# Patient Record
Sex: MALE | Race: WHITE | NOT HISPANIC OR LATINO | Employment: OTHER | URBAN - METROPOLITAN AREA
[De-identification: names, ages, dates, MRNs, and addresses within clinical notes are randomized per-mention and may not be internally consistent; named-entity substitution may affect disease eponyms.]

---

## 2017-04-06 ENCOUNTER — OFFICE VISIT (OUTPATIENT)
Dept: FAMILY MEDICINE | Facility: CLINIC | Age: 67
End: 2017-04-06
Payer: COMMERCIAL

## 2017-04-06 VITALS
WEIGHT: 239.5 LBS | OXYGEN SATURATION: 96 % | HEIGHT: 71 IN | HEART RATE: 71 BPM | DIASTOLIC BLOOD PRESSURE: 75 MMHG | RESPIRATION RATE: 18 BRPM | BODY MASS INDEX: 33.53 KG/M2 | TEMPERATURE: 96.5 F | SYSTOLIC BLOOD PRESSURE: 130 MMHG

## 2017-04-06 DIAGNOSIS — E11.9 TYPE 2 DIABETES MELLITUS WITHOUT COMPLICATION, WITHOUT LONG-TERM CURRENT USE OF INSULIN (H): Primary | ICD-10-CM

## 2017-04-06 DIAGNOSIS — Z23 NEED FOR PROPHYLACTIC VACCINATION WITH TETANUS-DIPHTHERIA (TD): ICD-10-CM

## 2017-04-06 DIAGNOSIS — F32.0 MAJOR DEPRESSIVE DISORDER, SINGLE EPISODE, MILD (H): ICD-10-CM

## 2017-04-06 DIAGNOSIS — E78.5 HYPERLIPIDEMIA LDL GOAL <100: ICD-10-CM

## 2017-04-06 DIAGNOSIS — I10 ESSENTIAL HYPERTENSION WITH GOAL BLOOD PRESSURE LESS THAN 140/90: ICD-10-CM

## 2017-04-06 DIAGNOSIS — Z13.89 SCREENING FOR DIABETIC PERIPHERAL NEUROPATHY: ICD-10-CM

## 2017-04-06 DIAGNOSIS — Z23 NEED FOR VACCINATION: ICD-10-CM

## 2017-04-06 LAB — HBA1C MFR BLD: 7.2 % (ref 4.3–6)

## 2017-04-06 PROCEDURE — 90471 IMMUNIZATION ADMIN: CPT | Performed by: FAMILY MEDICINE

## 2017-04-06 PROCEDURE — 36415 COLL VENOUS BLD VENIPUNCTURE: CPT | Performed by: FAMILY MEDICINE

## 2017-04-06 PROCEDURE — 90715 TDAP VACCINE 7 YRS/> IM: CPT | Mod: 25 | Performed by: FAMILY MEDICINE

## 2017-04-06 PROCEDURE — 83036 HEMOGLOBIN GLYCOSYLATED A1C: CPT | Performed by: FAMILY MEDICINE

## 2017-04-06 PROCEDURE — 99214 OFFICE O/P EST MOD 30 MIN: CPT | Mod: 25 | Performed by: FAMILY MEDICINE

## 2017-04-06 PROCEDURE — 99207 C FOOT EXAM  NO CHARGE: CPT | Mod: 25 | Performed by: FAMILY MEDICINE

## 2017-04-06 RX ORDER — FLUOXETINE 10 MG/1
CAPSULE ORAL
Qty: 60 CAPSULE | Refills: 1 | Status: SHIPPED | OUTPATIENT
Start: 2017-04-06 | End: 2017-05-04 | Stop reason: DRUGHIGH

## 2017-04-06 RX ORDER — LOVASTATIN 40 MG
TABLET ORAL
Qty: 135 TABLET | Refills: 3 | Status: SHIPPED | OUTPATIENT
Start: 2017-04-06 | End: 2018-06-12

## 2017-04-06 ASSESSMENT — PAIN SCALES - GENERAL: PAINLEVEL: NO PAIN (0)

## 2017-04-06 NOTE — NURSING NOTE
Prior to injection verified patient identity using patient's name and date of birth.   Patient instructed to remain in clinic for 20 minutes afterwards and to report any adverse reaction to me immediately.  Madelyn Costa, [facility]

## 2017-04-06 NOTE — NURSING NOTE
"Chief Complaint   Patient presents with     Hyperlipidemia     follow up     Diabetes     follow up     Hypertension     follow up       Initial /82 (BP Location: Left arm, Patient Position: Chair, Cuff Size: Adult Large)  Pulse 71  Temp 96.5  F (35.8  C) (Tympanic)  Resp 18  Ht 5' 11.1\" (1.806 m)  Wt 239 lb 8 oz (108.6 kg)  SpO2 96%  BMI 33.31 kg/m2 Estimated body mass index is 33.31 kg/(m^2) as calculated from the following:    Height as of this encounter: 5' 11.1\" (1.806 m).    Weight as of this encounter: 239 lb 8 oz (108.6 kg).  Medication Reconciliation: complete   Health Maintenance Due   Topic Date Due     EYE EXAM Q1 YEAR( NO INBASKET)  01/30/2013     TETANUS IMMUNIZATION (SYSTEM ASSIGNED)  11/14/2015     FOOT EXAM Q1 YEAR( NO INBASKET)  09/23/2016     DEPRESSION ACTION PLAN Q1 YR (NO INBASKET)  09/23/2016     ADVANCE DIRECTIVE PLANNING Q5 YRS (NO INBASKET)  12/21/2016     A1C Q6 MO( NO INBASKET)  04/19/2017     PHQ-9 Q6 MONTHS (NO INBASKET)  04/19/2017     Madelyn Costa, [facility]      "

## 2017-04-06 NOTE — PROGRESS NOTES
SUBJECTIVE:                                                    Mj Ruth is a 66 year old male who presents to clinic today for the following health issues:        Diabetes Follow-up    Patient is checking blood sugars: once daily.  Results are as follows:         am - before breakfast    Diabetic concerns: None     Symptoms of hypoglycemia (low blood sugar): none     Paresthesias (numbness or burning in feet) or sores: No     Date of last diabetic eye exam: October 2016     Hyperlipidemia Follow-Up      Rate your low fat/cholesterol diet?: fair    Taking statin?  Yes, no muscle aches from statin    Other lipid medications/supplements?:  none     Hypertension Follow-up      Outpatient blood pressures are being checked at store.  Results are systolic 130's, diastolic 85.    Low Salt Diet: low salt         Amount of exercise or physical activity: None    Problems taking medications regularly: No    Medication side effects: none    Diet: regular (no restrictions)          Problem list and histories reviewed & adjusted, as indicated.  Additional history: as documented    Patient Active Problem List   Diagnosis     Erectile dysfunction     Shingles     Obstructive chronic bronchitis with exacerbation (H)     Health Care Home     Advanced directives, counseling/discussion     Hyperlipidemia LDL goal <100     Type 2 diabetes mellitus with other diabetic kidney complication (H)     Essential hypertension with goal blood pressure less than 140/90     Chronic obstructive pulmonary disease, unspecified COPD type (H)     Past Surgical History:   Procedure Laterality Date     COLONOSCOPY N/A 11/5/2014    Procedure: COMBINED COLONOSCOPY, SINGLE OR MULTIPLE BIOPSY/POLYPECTOMY BY BIOPSY;  Surgeon: Douglas Ortiz MD;  Location:  GI     FINGER SURGERY  1998    pins and repair after caught in lathe     HC REMOVE TONSILS/ADENOIDS,<11 Y/O      T & A <11y.o.       Social History   Substance Use Topics     Smoking  "status: Former Smoker     Packs/day: 1.00     Years: 35.00     Types: Cigarettes     Quit date: 1/4/2011     Smokeless tobacco: Never Used     Alcohol use Yes      Comment: once monthly     Family History   Problem Relation Age of Onset     DIABETES Mother      DIABETES Sister      Hypertension Mother      HEART DISEASE Father      Not sure     Obesity Mother            Reviewed and updated as needed this visit by clinical staff       Reviewed and updated as needed this visit by Provider         ROS:  Constitutional, HEENT, cardiovascular, pulmonary, gi and gu systems are negative, except as otherwise noted.    OBJECTIVE:                                                    /75  Pulse 71  Temp 96.5  F (35.8  C) (Tympanic)  Resp 18  Ht 5' 11.1\" (1.806 m)  Wt 239 lb 8 oz (108.6 kg)  SpO2 96%  BMI 33.31 kg/m2  Body mass index is 33.31 kg/(m^2).   GENERAL: healthy, alert and no distress  NECK: no adenopathy, no asymmetry, masses, or scars and thyroid normal to palpation  RESP: lungs clear to auscultation - no rales, rhonchi or wheezes  CV: regular rate and rhythm, normal S1 S2, no S3 or S4, no murmur, click or rub, no peripheral edema and peripheral pulses strong  ABDOMEN: soft, nontender, no hepatosplenomegaly, no masses and bowel sounds normal  MS: no gross musculoskeletal defects noted, no edema         Component      Latest Ref Rng & Units 4/6/2017   Hemoglobin A1C      4.3 - 6.0 % 7.2 (H)     ASSESSMENT:                                                        PLAN:                                                    1. Screening for diabetic peripheral neuropathy  normal  - FOOT EXAM  NO CHARGE [15149.114]    2. Need for prophylactic vaccination with tetanus-diphtheria (TD)      3. Hyperlipidemia LDL goal <100  Good control   - lovastatin (MEVACOR) 40 MG tablet; TAKE ONE & ONE-HALF TABLETS BY MOUTH ONCE DAILY  Dispense: 135 tablet; Refill: 3    4. Essential hypertension with goal blood pressure less than " 140/90  Stable   - lovastatin (MEVACOR) 40 MG tablet; TAKE ONE & ONE-HALF TABLETS BY MOUTH ONCE DAILY  Dispense: 135 tablet; Refill: 3    5. Type 2 diabetes mellitus without complication, without long-term current use of insulin (H)  Good control   - HEMOGLOBIN A1C    6. Major depressive disorder, single episode, mild (H)  Discussed at length today   Get out and be active   Daily exercise   - FLUoxetine (PROZAC) 10 MG capsule; One a day for two weeks then Two PO Q day  Dispense: 60 capsule; Refill: 1  Follow up one month   7. Need for vaccination    - TDAP VACCINE (ADACEL) [21220.002]  - 1st  Administration  [56604]        Work on weight loss  Regular exercise      I spent 25 minutes with this patient over 50% of the time was in healthcare counseling, careplan development, strategies for partnering in keeping this patient healthy and appropriate referrals and follow up    Thomas Bosch MD, MD  [facility]

## 2017-04-06 NOTE — MR AVS SNAPSHOT
After Visit Summary   4/6/2017    Mj Ruth    MRN: [id number]           Patient Information     Date Of Birth          1950        Visit Information        Provider Department      4/6/2017 10:00 AM Thomas Bosch MD [facility]        Today's Diagnoses     Type 2 diabetes mellitus without complication, without long-term current use of insulin (H)    -  1    Screening for diabetic peripheral neuropathy        Need for prophylactic vaccination with tetanus-diphtheria (TD)        Hyperlipidemia LDL goal <100        Essential hypertension with goal blood pressure less than 140/90        Major depressive disorder, single episode, mild (H)        Need for vaccination           Follow-ups after your visit        Your next 10 appointments already scheduled     May 04, 2017  9:20 AM CDT   SHORT with Thomas Bosch MD   [facility] ([facility])    [address] [place] [id number] [phone]              Who to contact     If you have questions or need follow up information about today's clinic visit or your schedule please contact [facility] directly at [phone].  Normal or non-critical lab and imaging results will be communicated to you by MyChart, letter or phone within 4 business days after the clinic has received the results. If you do not hear from us within 7 days, please contact the clinic through [employer] or phone. If you have a critical or abnormal lab result, we will notify you by phone as soon as possible.  Submit refill requests through [employer] or call your pharmacy and they will forward the refill request to us. Please allow 3 business days for your refill to be completed.          Additional Information About Your Visit        MyChart Information     [employer] lets you send messages to your doctor, view your test results, renew your prescriptions, schedule appointments and more. To sign up, go to  [email or web address] . Click on \"Log in\" on the left side of the screen, which will take you to the Welcome page. Then click on \"Sign up Now\" on the right side of the page.     You will be asked to enter the access code listed below, as well as some personal information. Please follow the directions to create your username and password.     Your access code is: [email or web address]  Expires: 2017  7:17 PM     Your access code will  in 90 days. If you need help or a new code, please call your [place] clinic or [phone].        Care EveryWhere ID     This is your Care EveryWhere ID. This could be used by other organizations to access your [place] medical records  [id number]        Your Vitals Were     Pulse Temperature Respirations Height Pulse Oximetry BMI (Body Mass Index)    71 96.5  F (35.8  C) (Tympanic) 18 5' 11.1\" (1.806 m) 96% 33.31 kg/m2       Blood Pressure from Last 3 Encounters:   17 130/75   10/19/16 136/84   11/20/15 132/78    Weight from Last 3 Encounters:   17 239 lb 8 oz (108.6 kg)   10/19/16 236 lb 8 oz (107.3 kg)   10/12/15 225 lb (102.1 kg)              We Performed the Following     1st  Administration  [58094]     DEPRESSION ACTION PLAN (DAP) Order [[id number]]     FOOT EXAM  NO CHARGE [23967.114]     HEMOGLOBIN A1C     SCREENING QUESTIONS FOR ADULT IMMUNIZATIONS     TDAP VACCINE (ADACEL) [21505.002]          Today's Medication Changes          These changes are accurate as of: 17 11:59 PM.  If you have any questions, ask your nurse or doctor.               Start taking these medicines.        Dose/Directions    FLUoxetine 10 MG capsule   Commonly known as:  PROzac   Used for:  Major depressive disorder, single episode, mild (H)   Started by:  Thomas Bosch MD        One a day for two weeks then Two PO Q day   Quantity:  60 capsule   Refills:  1         These medicines have changed or have updated prescriptions.        Dose/Directions    lovastatin 40 MG tablet "   Commonly known as:  MEVACOR   This may have changed:  See the new instructions.   Used for:  Hyperlipidemia LDL goal <100, Essential hypertension with goal blood pressure less than 140/90   Changed by:  Thomas Bosch MD        TAKE ONE & ONE-HALF TABLETS BY MOUTH ONCE DAILY   Quantity:  135 tablet   Refills:  3            Where to get your medicines      These medications were sent to [facility] [address] [place] [address] - [address]  [address], [facility] [address]     Phone:  [phone]     FLUoxetine 10 MG capsule    lovastatin 40 MG tablet                Primary Care Provider Office Phone # Fax #    Thomas Bosch [phone]       [facility] [address] [facility] [address]        Thank you!     Thank you for choosing [facility]  for your care. Our goal is always to provide you with excellent care. Hearing back from our patients is one way we can continue to improve our services. Please take a few minutes to complete the written survey that you may receive in the mail after your visit with us. Thank you!             Your Updated Medication List - Protect others around you: Learn how to safely use, store and throw away your medicines at www.disposemymeds.org.          This list is accurate as of: 4/6/17 11:59 PM.  Always use your most recent med list.                   Brand Name Dispense Instructions for use    ACCU-CHEK WENDY Kit     1 kit    TO TEST 5 TIMES A DAY, BEFORE GIVING BOTH INSULIN       ACE/ARB NOT PRESCRIBED (INTENTIONAL)      1 each daily ACE & ARB not prescribed due to Intolerance       aspirin 81 MG tablet     30 tablet    Take 1 tablet (81 mg) by mouth daily       blood glucose calibration solution    no brand specified    1 Bottle    1 drop by Test Solution route every 30 days. WENDY       blood glucose monitoring test strip    ACCU-CHEK WENDY    3 Box    Use to test 4 times daily before meals and bedtime.       FLUoxetine 10 MG  capsule    PROzac    60 capsule    One a day for two weeks then Two PO Q day       ipratropium - albuterol 0.5 mg/2.5 mg/3 mL 0.5-2.5 (3) MG/3ML neb solution    DUONEB    1 Box    Take 1 vial (3 mLs) by nebulization every 4 hours as needed       lovastatin 40 MG tablet    MEVACOR    135 tablet    TAKE ONE & ONE-HALF TABLETS BY MOUTH ONCE DAILY       metFORMIN 500 MG tablet    GLUCOPHAGE    180 tablet    Take 1 tablet (500 mg) by mouth 2 times daily (with meals)       metoprolol 100 MG tablet    LOPRESSOR    180 tablet    Take 1 tablet (100 mg) by mouth 2 times daily       order for DME     1 Device    Neb machine for home.       tadalafil 10 MG tablet    CIALIS    5 tablet    Take 0.5-1 tablets (5-10 mg) by mouth daily as needed for erectile dysfunction Never use with nitroglycerin, terazosin or doxazosin.

## 2017-04-06 NOTE — LETTER
My Depression Action Plan  Name: Mj Ruth   Date of Birth 1950  Date: 4/6/2017    My doctor: Thomas Bosch   My clinic: [address] [address] [phone]          GREEN    ZONE   Good Control    What it looks like:     Things are going generally well. You have normal up s and down s. You may even feel depressed from time to time, but bad moods usually last less than a day.   What you need to do:  1. Continue to care for yourself (see self care plan)  2. Check your depression survival kit and update it as needed  3. Follow your physician s recommendations including any medication.  4. Do not stop taking medication unless you consult with your physician first.           YELLOW         ZONE Getting Worse    What it looks like:     Depression is starting to interfere with your life.     It may be hard to get out of bed; you may be starting to isolate yourself from others.    Symptoms of depression are starting to last most all day and this has happened for several days.     You may have suicidal thoughts but they are not constant.   What you need to do:     1. Call your care team, your response to treatment will improve if you keep your care team informed of your progress. Yellow periods are signs an adjustment may need to be made.     2. Continue your self-care, even if you have to fake it!    3. Talk to someone in your support network    4. Open up your depression survival kit           RED    ZONE Medical Alert - Get Help    What it looks like:     Depression is seriously interfering with your life.     You may experience these or other symptoms: You can t get out of bed most days, can t work or engage in other necessary activities, you have trouble taking care of basic hygiene, or basic responsibilities, thoughts of suicide or death that will not go away, self-injurious behavior.     What you need to do:  1. Call your care team and  request a same-day appointment. If they are not available (weekends or after hours) call your local crisis line, emergency room or 911.      Electronically signed by: Nicole Costa, April 6, 2017    Depression Self Care Plan / Survival Kit    Self-Care for Depression  Here s the deal. Your body and mind are really not as separate as most people think.  What you do and think affects how you feel and how you feel influences what you do and think. This means if you do things that people who feel good do, it will help you feel better.  Sometimes this is all it takes.  There is also a place for medication and therapy depending on how severe your depression is, so be sure to consult with your medical provider and/ or Behavioral Health Consultant if your symptoms are worsening or not improving.     In order to better manage my stress, I will:    Exercise  Get some form of exercise, every day. This will help reduce pain and release endorphins, the  feel good  chemicals in your brain. This is almost as good as taking antidepressants!  This is not the same as joining a gym and then never going! (they count on that by the way ) It can be as simple as just going for a walk or doing some gardening, anything that will get you moving.      Hygiene   Maintain good hygiene (Get out of bed in the morning, Make your bed, Brush your teeth, Take a shower, and Get dressed like you were going to work, even if you are unemployed).  If your clothes don't fit try to get ones that do.    Diet  I will strive to eat foods that are good for me, drink plenty of water, and avoid excessive sugar, caffeine, alcohol, and other mood-altering substances.  Some foods that are helpful in depression are: complex carbohydrates, B vitamins, flaxseed, fish or fish oil, fresh fruits and vegetables.    Psychotherapy  I agree to participate in Individual Therapy (if recommended).    Medication  If prescribed medications, I agree to take them.  Missing doses can  result in serious side effects.  I understand that drinking alcohol, or other illicit drug use, may cause potential side effects.  I will not stop my medication abruptly without first discussing it with my provider.    Staying Connected With Others  I will stay in touch with my friends, family members, and my primary care provider/team.    Use your imagination  Be creative.  We all have a creative side; it doesn t matter if it s oil painting, sand castles, or mud pies! This will also kick up the endorphins.    Witness Beauty  (AKA stop and smell the roses) Take a look outside, even in mid-winter. Notice colors, textures. Watch the squirrels and birds.     Service to others  Be of service to others.  There is always someone else in need.  By helping others we can  get out of ourselves  and remember the really important things.  This also provides opportunities for practicing all the other parts of the program.    Humor  Laugh and be silly!  Adjust your TV habits for less news and crime-drama and more comedy.    Control your stress  Try breathing deep, massage therapy, biofeedback, and meditation. Find time to relax each day.     My support system    Clinic Contact:  Phone number:    Contact 1:  Phone number:    Contact 2:  Phone number:    Jewish/:  Phone number:    Therapist:  Phone number:    Local crisis center:    Phone number:    Other community support:  Phone number:

## 2017-04-07 ASSESSMENT — PATIENT HEALTH QUESTIONNAIRE - PHQ9: SUM OF ALL RESPONSES TO PHQ QUESTIONS 1-9: 15

## 2017-05-04 ENCOUNTER — OFFICE VISIT (OUTPATIENT)
Dept: FAMILY MEDICINE | Facility: CLINIC | Age: 67
End: 2017-05-04
Payer: COMMERCIAL

## 2017-05-04 VITALS
WEIGHT: 237.5 LBS | HEART RATE: 74 BPM | OXYGEN SATURATION: 96 % | TEMPERATURE: 98.5 F | DIASTOLIC BLOOD PRESSURE: 86 MMHG | BODY MASS INDEX: 33.03 KG/M2 | RESPIRATION RATE: 16 BRPM | SYSTOLIC BLOOD PRESSURE: 168 MMHG

## 2017-05-04 DIAGNOSIS — L57.8 SUN-DAMAGED SKIN: ICD-10-CM

## 2017-05-04 DIAGNOSIS — I10 HYPERTENSION GOAL BP (BLOOD PRESSURE) < 140/90: ICD-10-CM

## 2017-05-04 DIAGNOSIS — F32.0 MAJOR DEPRESSIVE DISORDER, SINGLE EPISODE, MILD (H): Primary | ICD-10-CM

## 2017-05-04 PROCEDURE — 99214 OFFICE O/P EST MOD 30 MIN: CPT | Performed by: FAMILY MEDICINE

## 2017-05-04 RX ORDER — AMLODIPINE BESYLATE 5 MG/1
5 TABLET ORAL DAILY
Qty: 31 TABLET | Refills: 1 | Status: SHIPPED | OUTPATIENT
Start: 2017-05-04 | End: 2017-06-01 | Stop reason: DRUGHIGH

## 2017-05-04 RX ORDER — VENLAFAXINE HYDROCHLORIDE 75 MG/1
75 CAPSULE, EXTENDED RELEASE ORAL DAILY
Qty: 31 CAPSULE | Refills: 1 | Status: SHIPPED | OUTPATIENT
Start: 2017-05-04 | End: 2017-06-01

## 2017-05-04 ASSESSMENT — PAIN SCALES - GENERAL: PAINLEVEL: NO PAIN (0)

## 2017-05-04 NOTE — MR AVS SNAPSHOT
"              After Visit Summary   5/4/2017    Mj Ruth    MRN: [id number]           Patient Information     Date Of Birth          1950        Visit Information        Provider Department      5/4/2017 9:20 AM Thomas Bosch MD [facility]        Today's Diagnoses     Major depressive disorder, single episode, mild (H)    -  1    Hypertension goal BP (blood pressure) < 140/90        Sun-damaged skin           Follow-ups after your visit        Your next 10 appointments already scheduled     Jun 01, 2017 10:00 AM CDT   SHORT with Thomas Bosch MD   [facility] ([facility]    [address] [place] [id number] [phone]              Who to contact     If you have questions or need follow up information about today's clinic visit or your schedule please contact [facility] directly at [phone].  Normal or non-critical lab and imaging results will be communicated to you by MyChart, letter or phone within 4 business days after the clinic has received the results. If you do not hear from us within 7 days, please contact the clinic through MyChart or phone. If you have a critical or abnormal lab result, we will notify you by phone as soon as possible.  Submit refill requests through [employer] or call your pharmacy and they will forward the refill request to us. Please allow 3 business days for your refill to be completed.          Additional Information About Your Visit        MyChart Information     [employer] lets you send messages to your doctor, view your test results, renew your prescriptions, schedule appointments and more. To sign up, go to [email or web address] . Click on \"Log in\" on the left side of the screen, which will take you to the Welcome page. Then click on \"Sign up Now\" on the right side of the page.     You will be asked to enter the access code listed below, as well as some personal information. Please " follow the directions to create your username and password.     Your access code is: [email or web address]  Expires: 2017  7:17 PM     Your access code will  in 90 days. If you need help or a new code, please call your [place] clinic or [phone].        Care EveryWhere ID     This is your Care EveryWhere ID. This could be used by other organizations to access your [place] medical records  [id number]        Your Vitals Were     Pulse Temperature Respirations Pulse Oximetry BMI (Body Mass Index)       74 98.5  F (36.9  C) (Tympanic) 16 96% 33.03 kg/m2        Blood Pressure from Last 3 Encounters:   17 168/86   17 130/75   10/19/16 136/84    Weight from Last 3 Encounters:   17 237 lb 8 oz (107.7 kg)   17 239 lb 8 oz (108.6 kg)   10/19/16 236 lb 8 oz (107.3 kg)              Today, you had the following     No orders found for display         Today's Medication Changes          These changes are accurate as of: 17 11:28 AM.  If you have any questions, ask your nurse or doctor.               Start taking these medicines.        Dose/Directions    amLODIPine 5 MG tablet   Commonly known as:  NORVASC   Used for:  Hypertension goal BP (blood pressure) < 140/90   Started by:  Thomas Bosch MD        Dose:  5 mg   Take 1 tablet (5 mg) by mouth daily   Quantity:  31 tablet   Refills:  1       venlafaxine 75 MG 24 hr capsule   Commonly known as:  EFFEXOR-XR   Used for:  Major depressive disorder, single episode, mild (H)   Started by:  Thomas Bosch MD        Dose:  75 mg   Take 1 capsule (75 mg) by mouth daily   Quantity:  31 capsule   Refills:  1         Stop taking these medicines if you haven't already. Please contact your care team if you have questions.     FLUoxetine 10 MG capsule   Commonly known as:  PROzac   Stopped by:  Thomas Bosch MD                Where to get your medicines      These medications were sent to [facility] [address] [place] [address] [address]   [address] [facility] [address]     Phone:  [phone]     amLODIPine 5 MG tablet    venlafaxine 75 MG 24 hr capsule                Primary Care Provider Office Phone # Fax #    Thomas Bosch [phone]       [facility] [address] [facility] [address]        Thank you!     Thank you for choosing [facility]  for your care. Our goal is always to provide you with excellent care. Hearing back from our patients is one way we can continue to improve our services. Please take a few minutes to complete the written survey that you may receive in the mail after your visit with us. Thank you!             Your Updated Medication List - Protect others around you: Learn how to safely use, store and throw away your medicines at www.disposemymeds.org.          This list is accurate as of: 5/4/17 11:28 AM.  Always use your most recent med list.                   Brand Name Dispense Instructions for use    ACCU-CHEK WENDY Kit     1 kit    TO TEST 5 TIMES A DAY, BEFORE GIVING BOTH INSULIN       ACE/ARB NOT PRESCRIBED (INTENTIONAL)      1 each daily ACE & ARB not prescribed due to Intolerance       amLODIPine 5 MG tablet    NORVASC    31 tablet    Take 1 tablet (5 mg) by mouth daily       aspirin 81 MG tablet     30 tablet    Take 1 tablet (81 mg) by mouth daily       blood glucose calibration solution    no brand specified    1 Bottle    1 drop by Test Solution route every 30 days. WENDY       blood glucose monitoring test strip    ACCU-CHEK WENDY    3 Box    Use to test 4 times daily before meals and bedtime.       ipratropium - albuterol 0.5 mg/2.5 mg/3 mL 0.5-2.5 (3) MG/3ML neb solution    DUONEB    1 Box    Take 1 vial (3 mLs) by nebulization every 4 hours as needed       lovastatin 40 MG tablet    MEVACOR    135 tablet    TAKE ONE & ONE-HALF TABLETS BY MOUTH ONCE DAILY       metFORMIN 500 MG tablet    GLUCOPHAGE    180 tablet    Take 1 tablet (500 mg) by mouth 2 times daily  (with meals)       metoprolol 100 MG tablet    LOPRESSOR    180 tablet    Take 1 tablet (100 mg) by mouth 2 times daily       order for DME     1 Device    Neb machine for home.       tadalafil 10 MG tablet    CIALIS    5 tablet    Take 0.5-1 tablets (5-10 mg) by mouth daily as needed for erectile dysfunction Never use with nitroglycerin, terazosin or doxazosin.       venlafaxine 75 MG 24 hr capsule    EFFEXOR-XR    31 capsule    Take 1 capsule (75 mg) by mouth daily

## 2017-05-04 NOTE — PROGRESS NOTES
SUBJECTIVE:                                                    Mj Ruth is a 66 year old male who presents to clinic today for the following health issues:      Depression Followup    Status since last visit: no change    See PHQ-9 for current symptoms.  Other associated symptoms: None    Complicating factors:   Significant life event:  No   Current substance abuse:  None  Anxiety or Panic symptoms:  No    PHQ-9  English PHQ-9   Any Language            Amount of exercise or physical activity: None    Problems taking medications regularly: No    Medication side effects: none    Diet: regular (no restrictions)          Problem list and histories reviewed & adjusted, as indicated.  Additional history: as documented    Patient Active Problem List   Diagnosis     Erectile dysfunction     Shingles     Obstructive chronic bronchitis with exacerbation (H)     Health Care Home     Advanced directives, counseling/discussion     Hyperlipidemia LDL goal <100     Type 2 diabetes mellitus with other diabetic kidney complication (H)     Essential hypertension with goal blood pressure less than 140/90     Chronic obstructive pulmonary disease, unspecified COPD type (H)     Past Surgical History:   Procedure Laterality Date     COLONOSCOPY N/A 11/5/2014    Procedure: COMBINED COLONOSCOPY, SINGLE OR MULTIPLE BIOPSY/POLYPECTOMY BY BIOPSY;  Surgeon: Douglas Ortiz MD;  Location:  GI     FINGER SURGERY  1998    pins and repair after caught in lathe     HC REMOVE TONSILS/ADENOIDS,<13 Y/O      T & A <11y.o.       Social History   Substance Use Topics     Smoking status: Former Smoker     Packs/day: 1.00     Years: 35.00     Types: Cigarettes     Quit date: 1/4/2011     Smokeless tobacco: Never Used     Alcohol use Yes      Comment: once monthly or less     Family History   Problem Relation Age of Onset     DIABETES Mother      DIABETES Sister      Hypertension Mother      HEART DISEASE Father      Not sure      Obesity Mother            Reviewed and updated as needed this visit by clinical staff       Reviewed and updated as needed this visit by Provider         ROS:  Constitutional, HEENT, cardiovascular, pulmonary, gi and gu systems are negative, except as otherwise noted.    OBJECTIVE:                                                    /86 (BP Location: Right arm, Patient Position: Chair, Cuff Size: Adult Large)  Pulse 74  Temp 98.5  F (36.9  C) (Tympanic)  Resp 16  Wt 237 lb 8 oz (107.7 kg)  SpO2 96%  BMI 33.03 kg/m2  Body mass index is 33.03 kg/(m^2).  GENERAL: healthy, alert and no distress  SKIN: no suspicious lesions or rashes and lichenification - arms    Diagnostic Test Results:  none      ASSESSMENT/PLAN:                                                            1. Major depressive disorder, single episode, mild (H)  Poor control  D/C Prozac  try  Effexor recheck in clinic in one month   - venlafaxine (EFFEXOR-XR) 75 MG 24 hr capsule; Take 1 capsule (75 mg) by mouth daily  Dispense: 31 capsule; Refill: 1    2. Hypertension goal BP (blood pressure) < 140/90  Poor control   Add  - amLODIPine (NORVASC) 5 MG tablet; Take 1 tablet (5 mg) by mouth daily  Dispense: 31 tablet; Refill: 1  Recheck in one month     3. Sun damaged skin patient declined derm referral        Work on weight loss  Regular exercise    Thomas Bosch MD  [facility]

## 2017-05-04 NOTE — NURSING NOTE
"Chief Complaint   Patient presents with     Depression     follow up- prozac       Initial /86 (BP Location: Right arm, Patient Position: Chair, Cuff Size: Adult Large)  Pulse 74  Temp 98.5  F (36.9  C) (Tympanic)  Resp 16  Wt 237 lb 8 oz (107.7 kg)  SpO2 96%  BMI 33.03 kg/m2 Estimated body mass index is 33.03 kg/(m^2) as calculated from the following:    Height as of 4/6/17: 5' 11.1\" (1.806 m).    Weight as of this encounter: 237 lb 8 oz (107.7 kg).  Medication Reconciliation: complete   Health Maintenance Due   Topic Date Due     EYE EXAM Q1 YEAR( NO INBASKET)  01/30/2013     Madelyn Costa, [facility]      "

## 2017-05-05 ASSESSMENT — PATIENT HEALTH QUESTIONNAIRE - PHQ9: SUM OF ALL RESPONSES TO PHQ QUESTIONS 1-9: 9

## 2017-05-08 ENCOUNTER — TELEPHONE (OUTPATIENT)
Dept: FAMILY MEDICINE | Facility: CLINIC | Age: 67
End: 2017-05-08

## 2017-05-09 ENCOUNTER — OFFICE VISIT (OUTPATIENT)
Dept: FAMILY MEDICINE | Facility: CLINIC | Age: 67
End: 2017-05-09
Payer: COMMERCIAL

## 2017-05-09 VITALS
DIASTOLIC BLOOD PRESSURE: 80 MMHG | WEIGHT: 235.25 LBS | BODY MASS INDEX: 32.72 KG/M2 | SYSTOLIC BLOOD PRESSURE: 160 MMHG

## 2017-05-09 DIAGNOSIS — I10 ESSENTIAL HYPERTENSION WITH GOAL BLOOD PRESSURE LESS THAN 140/90: Primary | ICD-10-CM

## 2017-05-09 PROCEDURE — 99207 ZZC NO CHARGE NURSE ONLY: CPT | Performed by: FAMILY MEDICINE

## 2017-05-09 ASSESSMENT — PAIN SCALES - GENERAL: PAINLEVEL: NO PAIN (0)

## 2017-05-09 NOTE — PROGRESS NOTES
The patient did not understand his instructions with his last appt. And stopped his Metoprolol. I stated we added Norvasc to his Metoprolol now he has the right schedule   Metoprolol 100 mg daily  Norvasc 5 mg daily   Will recheck with me on June 1st

## 2017-05-09 NOTE — TELEPHONE ENCOUNTER
Spoke with Mj and he is still a little shaky. His blood sugar at 6:00 am this morning was 138.   Blood pressure last night 191/103.  Blood pressure at 7:40 am today was 183/105.    I used dr only spot to get him in today to see Danitza.   Madelyn Costa, [facility]

## 2017-05-09 NOTE — MR AVS SNAPSHOT
"              After Visit Summary   5/9/2017    Mj Ruth    MRN: [id number]           Patient Information     Date Of Birth          1950        Visit Information        Provider Department      5/9/2017 11:40 AM Thomas Bosch MD [facility]        Today's Diagnoses     Essential hypertension with goal blood pressure less than 140/90    -  1       Follow-ups after your visit        Your next 10 appointments already scheduled     May 09, 2017 11:40 AM CDT   SHORT with Thomas Bosch MD   [facility] ([address] [phone] [phone]            Jun 01, 2017 10:00 AM CDT   SHORT with Thomas Bosch MD   [facility] ([address] [phone]              Who to contact     If you have questions or need follow up information about today's clinic visit or your schedule please contact [facility] directly at [phone].  Normal or non-critical lab and imaging results will be communicated to you by [employer], letter or phone within 4 business days after the clinic has received the results. If you do not hear from us within 7 days, please contact the clinic through [employer] or phone. If you have a critical or abnormal lab result, we will notify you by phone as soon as possible.  Submit refill requests through [employer] or call your pharmacy and they will forward the refill request to us. Please allow 3 business days for your refill to be completed.          Additional Information About Your Visit        [employer] Information     [employer] lets you send messages to your doctor, view your test results, renew your prescriptions, schedule appointments and more. To sign up, go to [email or web address] . Click on \"Log in\" on the left side of the screen, which will take you to the Welcome page. Then click on \"Sign up Now\" on the " right side of the page.     You will be asked to enter the access code listed below, as well as some personal information. Please follow the directions to create your username and password.     Your access code is: [email or web address]  Expires: 2017  7:17 PM     Your access code will  in 90 days. If you need help or a new code, please call your [facility] or [phone].        Care EveryWhere ID     This is your Care EveryWhere ID. This could be used by other organizations to access your [place] medical records  [id number]        Your Vitals Were     BMI (Body Mass Index)                   32.72 kg/m2            Blood Pressure from Last 3 Encounters:   17 160/80   17 168/86   17 130/75    Weight from Last 3 Encounters:   17 235 lb 4 oz (106.7 kg)   17 237 lb 8 oz (107.7 kg)   17 239 lb 8 oz (108.6 kg)              Today, you had the following     No orders found for display       Primary Care Provider Office Phone # Fax #    Tohmas Bosch [phone]       [address]   [place] [address]        Thank you!     Thank you for choosing [facility]  for your care. Our goal is always to provide you with excellent care. Hearing back from our patients is one way we can continue to improve our services. Please take a few minutes to complete the written survey that you may receive in the mail after your visit with us. Thank you!             Your Updated Medication List - Protect others around you: Learn how to safely use, store and throw away your medicines at www.disposemymeds.org.          This list is accurate as of: 17 10:44 AM.  Always use your most recent med list.                   Brand Name Dispense Instructions for use    ACCU-CHEK WENDY Kit     1 kit    TO TEST 5 TIMES A DAY, BEFORE GIVING BOTH INSULIN       ACE/ARB NOT PRESCRIBED (INTENTIONAL)      1 each daily ACE & ARB not prescribed due to  Intolerance       amLODIPine 5 MG tablet    NORVASC    31 tablet    Take 1 tablet (5 mg) by mouth daily       aspirin 81 MG tablet     30 tablet    Take 1 tablet (81 mg) by mouth daily       blood glucose calibration solution    no brand specified    1 Bottle    1 drop by Test Solution route every 30 days. WENDY       blood glucose monitoring test strip    ACCU-CHEK WENDY    3 Box    Use to test 4 times daily before meals and bedtime.       ipratropium - albuterol 0.5 mg/2.5 mg/3 mL 0.5-2.5 (3) MG/3ML neb solution    DUONEB    1 Box    Take 1 vial (3 mLs) by nebulization every 4 hours as needed       lovastatin 40 MG tablet    MEVACOR    135 tablet    TAKE ONE & ONE-HALF TABLETS BY MOUTH ONCE DAILY       metFORMIN 500 MG tablet    GLUCOPHAGE    180 tablet    Take 1 tablet (500 mg) by mouth 2 times daily (with meals)       metoprolol 100 MG tablet    LOPRESSOR    180 tablet    Take 1 tablet (100 mg) by mouth 2 times daily       order for DME     1 Device    Neb machine for home.       tadalafil 10 MG tablet    CIALIS    5 tablet    Take 0.5-1 tablets (5-10 mg) by mouth daily as needed for erectile dysfunction Never use with nitroglycerin, terazosin or doxazosin.       venlafaxine 75 MG 24 hr capsule    EFFEXOR-XR    31 capsule    Take 1 capsule (75 mg) by mouth daily

## 2017-06-01 ENCOUNTER — OFFICE VISIT (OUTPATIENT)
Dept: FAMILY MEDICINE | Facility: CLINIC | Age: 67
End: 2017-06-01
Payer: COMMERCIAL

## 2017-06-01 VITALS
SYSTOLIC BLOOD PRESSURE: 150 MMHG | DIASTOLIC BLOOD PRESSURE: 74 MMHG | BODY MASS INDEX: 33.24 KG/M2 | TEMPERATURE: 98.1 F | WEIGHT: 239 LBS | OXYGEN SATURATION: 98 % | HEART RATE: 78 BPM | RESPIRATION RATE: 16 BRPM

## 2017-06-01 DIAGNOSIS — I10 HYPERTENSION GOAL BP (BLOOD PRESSURE) < 140/90: ICD-10-CM

## 2017-06-01 DIAGNOSIS — F32.0 MAJOR DEPRESSIVE DISORDER, SINGLE EPISODE, MILD (H): ICD-10-CM

## 2017-06-01 PROCEDURE — 99213 OFFICE O/P EST LOW 20 MIN: CPT | Performed by: FAMILY MEDICINE

## 2017-06-01 RX ORDER — AMLODIPINE BESYLATE 10 MG/1
10 TABLET ORAL DAILY
Qty: 90 TABLET | Refills: 3 | Status: SHIPPED | OUTPATIENT
Start: 2017-06-01 | End: 2018-06-12

## 2017-06-01 RX ORDER — VENLAFAXINE HYDROCHLORIDE 75 MG/1
75 CAPSULE, EXTENDED RELEASE ORAL DAILY
Qty: 90 CAPSULE | Refills: 3 | Status: SHIPPED | OUTPATIENT
Start: 2017-06-01 | End: 2017-09-21

## 2017-06-01 ASSESSMENT — PAIN SCALES - GENERAL: PAINLEVEL: NO PAIN (0)

## 2017-06-01 NOTE — NURSING NOTE
"Chief Complaint   Patient presents with     Hypertension     follow up     Depression     follow up       Initial /74 (BP Location: Right arm, Patient Position: Chair, Cuff Size: Adult Large)  Pulse 78  Temp 98.1  F (36.7  C) (Tympanic)  Resp 16  Wt 239 lb (108.4 kg)  SpO2 98%  BMI 33.24 kg/m2 Estimated body mass index is 33.24 kg/(m^2) as calculated from the following:    Height as of 4/6/17: 5' 11.1\" (1.806 m).    Weight as of this encounter: 239 lb (108.4 kg).  Medication Reconciliation: complete   Health Maintenance Due   Topic Date Due     EYE EXAM Q1 YEAR  01/30/2013     Madelyn Costa, [facility]      "

## 2017-06-01 NOTE — MR AVS SNAPSHOT
"              After Visit Summary   2017    Mj Ruth    MRN: [id number]           Patient Information     Date Of Birth          1950        Visit Information        Provider Department      2017 10:00 AM Thomas Boshc MD [facility]        Today's Diagnoses     Hypertension goal BP (blood pressure) < 140/90           Follow-ups after your visit        Who to contact     If you have questions or need follow up information about today's clinic visit or your schedule please contact [facility] directly at [phone].  Normal or non-critical lab and imaging results will be communicated to you by "[employer]hart, letter or phone within 4 business days after the clinic has received the results. If you do not hear from us within 7 days, please contact the clinic through [employer] or phone. If you have a critical or abnormal lab result, we will notify you by phone as soon as possible.  Submit refill requests through [employer] or call your pharmacy and they will forward the refill request to us. Please allow 3 business days for your refill to be completed.          Additional Information About Your Visit        MyChart Information     [employer] lets you send messages to your doctor, view your test results, renew your prescriptions, schedule appointments and more. To sign up, go to [email or web address] . Click on \"Log in\" on the left side of the screen, which will take you to the Welcome page. Then click on \"Sign up Now\" on the right side of the page.     You will be asked to enter the access code listed below, as well as some personal information. Please follow the directions to create your username and password.     Your access code is: [email or web address]  Expires: 2017  7:17 PM     Your access code will  in 90 days. If you need help or a new code, please call your [facility] or [phone].        Care EveryWhere ID     This is your Care EveryWhere ID. This could be " used by other organizations to access your [place] medical records  [id number]        Your Vitals Were     Pulse Temperature Respirations Pulse Oximetry BMI (Body Mass Index)       78 98.1  F (36.7  C) (Tympanic) 16 98% 33.24 kg/m2        Blood Pressure from Last 3 Encounters:   06/01/17 150/74   05/09/17 160/80   05/04/17 168/86    Weight from Last 3 Encounters:   06/01/17 239 lb (108.4 kg)   05/09/17 235 lb 4 oz (106.7 kg)   05/04/17 237 lb 8 oz (107.7 kg)              Today, you had the following     No orders found for display         Today's Medication Changes          These changes are accurate as of: 6/1/17 11:56 AM.  If you have any questions, ask your nurse or doctor.               These medicines have changed or have updated prescriptions.        Dose/Directions    amLODIPine 10 MG tablet   Commonly known as:  NORVASC   This may have changed:    - medication strength  - how much to take   Used for:  Hypertension goal BP (blood pressure) < 140/90   Changed by:  Thomas Bosch MD        Dose:  10 mg   Take 1 tablet (10 mg) by mouth daily   Quantity:  90 tablet   Refills:  3            Where to get your medicines      These medications were sent to [facility] [address] [place] [address] [address]  [address] [facility] [address]     Phone:  [phone]     amLODIPine 10 MG tablet                Primary Care Provider Office Phone # Fax #    Thomas Bosch [phone]       [address]   [facility] [address]        Thank you!     Thank you for choosing [facility]  for your care. Our goal is always to provide you with excellent care. Hearing back from our patients is one way we can continue to improve our services. Please take a few minutes to complete the written survey that you may receive in the mail after your visit with us. Thank you!             Your Updated Medication List - Protect others around you: Learn how to safely use,  store and throw away your medicines at www.disposemymeds.org.          This list is accurate as of: 6/1/17 11:56 AM.  Always use your most recent med list.                   Brand Name Dispense Instructions for use    ACCU-CHEK WENDY Kit     1 kit    TO TEST 5 TIMES A DAY, BEFORE GIVING BOTH INSULIN       ACE/ARB NOT PRESCRIBED (INTENTIONAL)      1 each daily ACE & ARB not prescribed due to Intolerance       amLODIPine 10 MG tablet    NORVASC    90 tablet    Take 1 tablet (10 mg) by mouth daily       aspirin 81 MG tablet     30 tablet    Take 1 tablet (81 mg) by mouth daily       blood glucose calibration solution    no brand specified    1 Bottle    1 drop by Test Solution route every 30 days. WENDY       blood glucose monitoring test strip    ACCU-CHEK WENDY    3 Box    Use to test 4 times daily before meals and bedtime.       ipratropium - albuterol 0.5 mg/2.5 mg/3 mL 0.5-2.5 (3) MG/3ML neb solution    DUONEB    1 Box    Take 1 vial (3 mLs) by nebulization every 4 hours as needed       lovastatin 40 MG tablet    MEVACOR    135 tablet    TAKE ONE & ONE-HALF TABLETS BY MOUTH ONCE DAILY       metFORMIN 500 MG tablet    GLUCOPHAGE    180 tablet    Take 1 tablet (500 mg) by mouth 2 times daily (with meals)       metoprolol 100 MG tablet    LOPRESSOR    180 tablet    Take 1 tablet (100 mg) by mouth 2 times daily       order for DME     1 Device    Neb machine for home.       tadalafil 10 MG tablet    CIALIS    5 tablet    Take 0.5-1 tablets (5-10 mg) by mouth daily as needed for erectile dysfunction Never use with nitroglycerin, terazosin or doxazosin.       venlafaxine 75 MG 24 hr capsule    EFFEXOR-XR    31 capsule    Take 1 capsule (75 mg) by mouth daily

## 2017-06-01 NOTE — PROGRESS NOTES
SUBJECTIVE:                                                    Mj Ruth is a 66 year old male who presents to clinic today for the following health issues:      Hypertension Follow-up      Outpatient blood pressures are being checked at home- twice daily.  Results are systolic 130-150, diastolic 77-94.    Low Salt Diet: low salt     Depression Followup    Status since last visit: Improved     See PHQ-9 for current symptoms.  Other associated symptoms: None    Complicating factors:   Significant life event:  No   Current substance abuse:  None  Anxiety or Panic symptoms:  No    PHQ-9  English PHQ-9   Any Language            Amount of exercise or physical activity: some yardwork, no regular exercise program    Problems taking medications regularly: No    Medication side effects: none    Diet: low salt          Problem list and histories reviewed & adjusted, as indicated.  Additional history: as documented    Patient Active Problem List   Diagnosis     Erectile dysfunction     Shingles     Obstructive chronic bronchitis with exacerbation (H)     Health Care Home     Advanced directives, counseling/discussion     Hyperlipidemia LDL goal <100     Type 2 diabetes mellitus with other diabetic kidney complication (H)     Essential hypertension with goal blood pressure less than 140/90     Chronic obstructive pulmonary disease, unspecified COPD type (H)     Past Surgical History:   Procedure Laterality Date     COLONOSCOPY N/A 11/5/2014    Procedure: COMBINED COLONOSCOPY, SINGLE OR MULTIPLE BIOPSY/POLYPECTOMY BY BIOPSY;  Surgeon: Douglas Ortiz MD;  Location:  GI     FINGER SURGERY  1998    pins and repair after caught in lathe     HC REMOVE TONSILS/ADENOIDS,<13 Y/O      T & A <11y.o.       Social History   Substance Use Topics     Smoking status: Former Smoker     Packs/day: 1.00     Years: 35.00     Types: Cigarettes     Quit date: 1/4/2011     Smokeless tobacco: Never Used     Alcohol use Yes       Comment: once monthly or less     Family History   Problem Relation Age of Onset     DIABETES Mother      DIABETES Sister      Hypertension Mother      HEART DISEASE Father      Not sure     Obesity Mother          BP Readings from Last 3 Encounters:   06/01/17 150/74   05/09/17 160/80   05/04/17 168/86    Wt Readings from Last 3 Encounters:   06/01/17 239 lb (108.4 kg)   05/09/17 235 lb 4 oz (106.7 kg)   05/04/17 237 lb 8 oz (107.7 kg)                    Reviewed and updated as needed this visit by clinical staff       Reviewed and updated as needed this visit by Provider         ROS:  Constitutional, HEENT, cardiovascular, pulmonary, gi and gu systems are negative, except as otherwise noted.    OBJECTIVE:                                                    /74 (BP Location: Right arm, Patient Position: Chair, Cuff Size: Adult Large)  Pulse 78  Temp 98.1  F (36.7  C) (Tympanic)  Resp 16  Wt 239 lb (108.4 kg)  SpO2 98%  BMI 33.24 kg/m2  Body mass index is 33.24 kg/(m^2).   GENERAL: healthy, alert and no distress  RESP: lungs clear to auscultation - no rales, rhonchi or wheezes  CV: regular rate and rhythm, normal S1 S2, no S3 or S4, no murmur, click or rub, no peripheral edema and peripheral pulses strong    Diagnostic Test Results:  none      ASSESSMENT:                                                        PLAN:                                                    1. Hypertension goal BP (blood pressure) < 140/90  Improving will increase Norvasc to 10 mg a day to get B/P's into 120's/70's as a target   - amLODIPine (NORVASC) 10 MG tablet; Take 1 tablet (10 mg) by mouth daily  Dispense: 90 tablet; Refill: 3      2. Depression improving with the summer months   Daily exercise discussed and get involved in community activities  Stay on Effexor recheck in 6 months      Work on weight loss  Regular exercise    Thomas Bosch MD, MD  [facility]

## 2017-06-02 ASSESSMENT — PATIENT HEALTH QUESTIONNAIRE - PHQ9: SUM OF ALL RESPONSES TO PHQ QUESTIONS 1-9: 12

## 2017-09-21 ENCOUNTER — APPOINTMENT (OUTPATIENT)
Dept: GENERAL RADIOLOGY | Facility: CLINIC | Age: 67
End: 2017-09-21
Attending: PHYSICIAN ASSISTANT
Payer: MEDICARE

## 2017-09-21 ENCOUNTER — HOSPITAL ENCOUNTER (EMERGENCY)
Facility: CLINIC | Age: 67
Discharge: HOME | End: 2017-09-21
Attending: PHYSICIAN ASSISTANT | Admitting: PHYSICIAN ASSISTANT
Payer: MEDICARE

## 2017-09-21 ENCOUNTER — TELEPHONE (OUTPATIENT)
Dept: FAMILY MEDICINE | Facility: CLINIC | Age: 67
End: 2017-09-21

## 2017-09-21 VITALS
SYSTOLIC BLOOD PRESSURE: 149 MMHG | WEIGHT: 226 LBS | RESPIRATION RATE: 16 BRPM | DIASTOLIC BLOOD PRESSURE: 87 MMHG | TEMPERATURE: 97.8 F | BODY MASS INDEX: 31.43 KG/M2 | OXYGEN SATURATION: 95 % | HEART RATE: 77 BPM

## 2017-09-21 DIAGNOSIS — E11.9 TYPE 2 DIABETES MELLITUS WITHOUT COMPLICATION, WITHOUT LONG-TERM CURRENT USE OF INSULIN (H): ICD-10-CM

## 2017-09-21 DIAGNOSIS — Z87.891 PERSONAL HISTORY OF TOBACCO USE, PRESENTING HAZARDS TO HEALTH: ICD-10-CM

## 2017-09-21 DIAGNOSIS — Z79.4 ENCOUNTER FOR LONG-TERM (CURRENT) USE OF INSULIN (H): ICD-10-CM

## 2017-09-21 DIAGNOSIS — E11.29 TYPE 2 DIABETES MELLITUS WITH OTHER DIABETIC KIDNEY COMPLICATION (H): ICD-10-CM

## 2017-09-21 DIAGNOSIS — J44.9 CHRONIC OBSTRUCTIVE PULMONARY DISEASE, UNSPECIFIED COPD TYPE (H): ICD-10-CM

## 2017-09-21 LAB
ALBUMIN SERPL-MCNC: 3.5 G/DL (ref 3.4–5)
ALP SERPL-CCNC: 145 U/L (ref 40–150)
ALT SERPL W P-5'-P-CCNC: 26 U/L (ref 0–70)
ANION GAP SERPL CALCULATED.3IONS-SCNC: 12 MMOL/L (ref 3–14)
AST SERPL W P-5'-P-CCNC: 8 U/L (ref 0–45)
BASE EXCESS BLDV CALC-SCNC: 2.5 MMOL/L
BASOPHILS # BLD AUTO: 0 10E9/L (ref 0–0.2)
BASOPHILS NFR BLD AUTO: 0.4 %
BILIRUB SERPL-MCNC: 0.4 MG/DL (ref 0.2–1.3)
BUN SERPL-MCNC: 15 MG/DL (ref 7–30)
CALCIUM SERPL-MCNC: 8.7 MG/DL (ref 8.5–10.1)
CHLORIDE SERPL-SCNC: 101 MMOL/L (ref 94–109)
CO2 SERPL-SCNC: 25 MMOL/L (ref 20–32)
CREAT SERPL-MCNC: 1.07 MG/DL (ref 0.66–1.25)
DIFFERENTIAL METHOD BLD: NORMAL
EOSINOPHIL # BLD AUTO: 0.1 10E9/L (ref 0–0.7)
EOSINOPHIL NFR BLD AUTO: 1.3 %
ERYTHROCYTE [DISTWIDTH] IN BLOOD BY AUTOMATED COUNT: 13.5 % (ref 10–15)
GFR SERPL CREATININE-BSD FRML MDRD: 69 ML/MIN/1.7M2
GLUCOSE SERPL-MCNC: 525 MG/DL (ref 70–99)
HBA1C MFR BLD: 11 % (ref 4.3–6)
HCO3 BLDV-SCNC: 29 MMOL/L (ref 21–28)
HCT VFR BLD AUTO: 43.2 % (ref 40–53)
HGB BLD-MCNC: 14.8 G/DL (ref 13.3–17.7)
IMM GRANULOCYTES # BLD: 0 10E9/L (ref 0–0.4)
IMM GRANULOCYTES NFR BLD: 0.2 %
LYMPHOCYTES # BLD AUTO: 1.9 10E9/L (ref 0.8–5.3)
LYMPHOCYTES NFR BLD AUTO: 17.9 %
MCH RBC QN AUTO: 30 PG (ref 26.5–33)
MCHC RBC AUTO-ENTMCNC: 34.3 G/DL (ref 31.5–36.5)
MCV RBC AUTO: 88 FL (ref 78–100)
MONOCYTES # BLD AUTO: 0.7 10E9/L (ref 0–1.3)
MONOCYTES NFR BLD AUTO: 6 %
NEUTROPHILS # BLD AUTO: 8 10E9/L (ref 1.6–8.3)
NEUTROPHILS NFR BLD AUTO: 74.2 %
O2/TOTAL GAS SETTING VFR VENT: 21 %
PCO2 BLDV: 53 MM HG (ref 40–50)
PH BLDV: 7.35 PH (ref 7.32–7.43)
PLATELET # BLD AUTO: 288 10E9/L (ref 150–450)
PO2 BLDV: 25 MM HG (ref 25–47)
POTASSIUM SERPL-SCNC: 4.2 MMOL/L (ref 3.4–5.3)
PROT SERPL-MCNC: 7.2 G/DL (ref 6.8–8.8)
RBC # BLD AUTO: 4.93 10E12/L (ref 4.4–5.9)
SODIUM SERPL-SCNC: 138 MMOL/L (ref 133–144)
TSH SERPL DL<=0.005 MIU/L-ACNC: 1.19 MU/L (ref 0.4–4)
WBC # BLD AUTO: 10.8 10E9/L (ref 4–11)

## 2017-09-21 PROCEDURE — 99284 EMERGENCY DEPT VISIT MOD MDM: CPT | Mod: Z6 | Performed by: PHYSICIAN ASSISTANT

## 2017-09-21 PROCEDURE — 80053 COMPREHEN METABOLIC PANEL: CPT | Performed by: PHYSICIAN ASSISTANT

## 2017-09-21 PROCEDURE — 84443 ASSAY THYROID STIM HORMONE: CPT | Performed by: PHYSICIAN ASSISTANT

## 2017-09-21 PROCEDURE — 71020 XR CHEST 2 VW: CPT | Mod: TC

## 2017-09-21 PROCEDURE — 83036 HEMOGLOBIN GLYCOSYLATED A1C: CPT | Performed by: PHYSICIAN ASSISTANT

## 2017-09-21 PROCEDURE — 99284 EMERGENCY DEPT VISIT MOD MDM: CPT | Mod: 25 | Performed by: PHYSICIAN ASSISTANT

## 2017-09-21 PROCEDURE — 85025 COMPLETE CBC W/AUTO DIFF WBC: CPT | Performed by: PHYSICIAN ASSISTANT

## 2017-09-21 PROCEDURE — 82803 BLOOD GASES ANY COMBINATION: CPT | Performed by: PHYSICIAN ASSISTANT

## 2017-09-21 RX ORDER — GLYBURIDE 2.5 MG/1
2.5 TABLET ORAL
Qty: 30 TABLET | Refills: 0 | Status: SHIPPED | OUTPATIENT
Start: 2017-09-21 | End: 2017-09-27 | Stop reason: DRUGHIGH

## 2017-09-21 ASSESSMENT — ENCOUNTER SYMPTOMS
POLYPHAGIA: 0
APPETITE CHANGE: 1
COUGH: 0
ABDOMINAL PAIN: 0
POLYDIPSIA: 0
DYSURIA: 0
ACTIVITY CHANGE: 1
FEVER: 0
SHORTNESS OF BREATH: 1

## 2017-09-21 NOTE — ED AVS SNAPSHOT
[facility] Emergency Department    [address] [place] [address]    Phone:  [phone]    Fax:  [phone]                                       Mj Ruth   MRN: [id number]    Department:  [facility] Emergency Department   Date of Visit:  9/21/2017           Patient Information     Date Of Birth          1950        Your diagnoses for this visit were:     Type 2 diabetes mellitus with other diabetic kidney complication (H)     Chronic obstructive pulmonary disease, unspecified COPD type (H)     Type 2 diabetes mellitus without complication, without long-term current use of insulin (H)        You were seen by Hunter Zhao PA-C.      Follow-up Information     Follow up with Thomas Bosch MD. Schedule an appointment as soon as possible for a visit in 1 week.    Specialty:  Family Practice    Why:  For an ED recheck regarding your diabetes    Contact information:    [address] [place] [address] [address] [place] [address]  [phone]          Discharge Instructions       1.  Please start the Glyburide ( new diabetes medication ) to help with the blood sugars.    2.  Please INCREASE your Metformin ( current diabetes medication ) to 1,000 mg ( 2 tabs) 2 times per day.   3.  Please check your blood sugars 3 times per day and keep a blood sugar log.  Bring this blood sugar log into your recheck appointment with Dr. Bosch.  4.  Please consider attending a refresher session with the Diabetes Educator.  Dr. Bosch can help you set this up.        24 Hour Appointment Hotline       To make an appointment at any [facility], call [phone] ([phone]). If you don't have a family doctor or clinic, we will help you find one. [place] clinics are conveniently located to serve the needs of you and your family.             Review of your medicines      START taking        Dose / Directions Last dose taken    glyBURIDE 2.5 MG tablet   Commonly known as:  DIABETA /MICRONASE    Dose:  2.5 mg   Quantity:  30 tablet        Take 1 tablet (2.5 mg) by mouth daily (with breakfast)   Refills:  0          CONTINUE these medicines which may have CHANGED, or have new prescriptions. If we are uncertain of the size of tablets/capsules you have at home, strength may be listed as something that might have changed.        Dose / Directions Last dose taken    metFORMIN 500 MG tablet   Commonly known as:  GLUCOPHAGE   Dose:  1000 mg   What changed:  how much to take   Quantity:  360 tablet        Take 2 tablets (1,000 mg) by mouth 2 times daily (with meals)   Refills:  0          Our records show that you are taking the medicines listed below. If these are incorrect, please call your family doctor or clinic.        Dose / Directions Last dose taken    ACCU-CHEK WENDY Kit   Quantity:  1 kit        TO TEST 5 TIMES A DAY, BEFORE GIVING BOTH INSULIN   Refills:  0        ACE/ARB NOT PRESCRIBED (INTENTIONAL)   Dose:  1 each        1 each daily ACE & ARB not prescribed due to Intolerance   Refills:  0        amLODIPine 10 MG tablet   Commonly known as:  NORVASC   Dose:  10 mg   Quantity:  90 tablet        Take 1 tablet (10 mg) by mouth daily   Refills:  3        aspirin 81 MG tablet   Dose:  81 mg   Quantity:  30 tablet        Take 1 tablet (81 mg) by mouth daily   Refills:  11        blood glucose calibration solution   Commonly known as:  no brand specified   Dose:  1 drop   Quantity:  1 Bottle        1 drop by Test Solution route every 30 days. WENDY   Refills:  3        blood glucose monitoring test strip   Commonly known as:  ACCU-CHEK WENDY   Quantity:  3 Box        Use to test 4 times daily before meals and bedtime.   Refills:  0        ipratropium - albuterol 0.5 mg/2.5 mg/3 mL 0.5-2.5 (3) MG/3ML neb solution   Commonly known as:  DUONEB   Dose:  3 mL   Quantity:  1 Box        Take 1 vial (3 mLs) by nebulization every 4 hours as needed   Refills:  4        lovastatin 40 MG tablet   Commonly known as:   MEVACOR   Quantity:  135 tablet        TAKE ONE & ONE-HALF TABLETS BY MOUTH ONCE DAILY   Refills:  3        metoprolol 100 MG tablet   Commonly known as:  LOPRESSOR   Dose:  100 mg   Quantity:  180 tablet        Take 1 tablet (100 mg) by mouth 2 times daily   Refills:  3        order for DME   Quantity:  1 Device        Neb machine for home.   Refills:  0        tadalafil 10 MG tablet   Commonly known as:  CIALIS   Dose:  5-10 mg   Quantity:  5 tablet        Take 0.5-1 tablets (5-10 mg) by mouth daily as needed for erectile dysfunction Never use with nitroglycerin, terazosin or doxazosin.   Refills:  11                Prescriptions were sent or printed at these locations (2 Prescriptions)                   [facility] [address] [place] [address] [address]   [address] [facility] [address]    Telephone:  [phone]   Fax:  [phone]   Hours:                  E-Prescribed (2 of 2)         glyBURIDE (DIABETA /MICRONASE) 2.5 MG tablet               metFORMIN (GLUCOPHAGE) 500 MG tablet                Procedures and tests performed during your visit     Blood gas venous    CBC with platelets differential    Comprehensive metabolic panel    Hemoglobin A1c    Peripheral IV: Standard    TSH with free T4 reflex    XR Chest 2 Views      Orders Needing Specimen Collection     None      Pending Results     No orders found from 9/19/2017 to 9/22/2017.            Pending Culture Results     No orders found from 9/19/2017 to 9/22/2017.            Pending Results Instructions     If you had any lab results that were not finalized at the time of your Discharge, you can call the ED Lab Result RN at [phone]. You will be contacted by this team for any positive Lab results or changes in treatment. The nurses are available 7 days a week from 10A to 6:30P.  You can leave a message 24 hours per day and they will return your call.        Thank you for choosing [facility]       Thank you for choosing [facility] for your care.  "Our goal is always to provide you with excellent care. Hearing back from our patients is one way we can continue to improve our services. Please take a few minutes to complete the written survey that you may receive in the mail after you visit with us. Thank you!        [employer] Information     [employer] lets you send messages to your doctor, view your test results, renew your prescriptions, schedule appointments and more. To sign up, go to [email or web address] . Click on \"Log in\" on the left side of the screen, which will take you to the Welcome page. Then click on \"Sign up Now\" on the right side of the page.     You will be asked to enter the access code listed below, as well as some personal information. Please follow the directions to create your username and password.     Your access code is: [email or web address]  Expires: 2017  4:50 PM     Your access code will  in 90 days. If you need help or a new code, please call your [place] clinic or [phone].        Care EveryWhere ID     This is your Care EveryWhere ID. This could be used by other organizations to access your [place] medical records  [id number]        Equal Access to Services     REAL JESSICA AH: Hadii kristen Suh, wafito akhtar, qaybchau kaalmada barry, adam lawrence. So [facility] [phone].    ATENCIÓN: Si habla español, tiene a meyer disposición servicios gratuitos de asistencia lingüística. Peng al [phone].    We comply with applicable federal civil rights laws and [place] laws. We do not discriminate on the basis of race, color, national origin, age, disability sex, sexual orientation or gender identity.            After Visit Summary       This is your record. Keep this with you and show to your community pharmacist(s) and doctor(s) at your next visit.                  "

## 2017-09-21 NOTE — DISCHARGE INSTRUCTIONS
1.  Please start the Glyburide ( new diabetes medication ) to help with the blood sugars.    2.  Please INCREASE your Metformin ( current diabetes medication ) to 1,000 mg ( 2 tabs) 2 times per day.   3.  Please check your blood sugars 3 times per day and keep a blood sugar log.  Bring this blood sugar log into your recheck appointment with Dr. Bosch.  4.  Please consider attending a refresher session with the Diabetes Educator.  Dr. Bosch can help you set this up.

## 2017-09-21 NOTE — ED AVS SNAPSHOT
[facility] Emergency Department    [address] [place] [address]    Phone:  [phone]    Fax:  [phone]                                       Mj Ruth   MRN: [id number]    Department:  [facility] Emergency Department   Date of Visit:  9/21/2017           After Visit Summary Signature Page     I have received my discharge instructions, and my questions have been answered. I have discussed any challenges I see with this plan with the nurse or doctor.    ..........................................................................................................................................  Patient/Patient Representative Signature      ..........................................................................................................................................  Patient Representative Print Name and Relationship to Patient    ..................................................               ................................................  Date                                            Time    ..........................................................................................................................................  Reviewed by Signature/Title    ...................................................              ..............................................  Date                                                            Time

## 2017-09-21 NOTE — ED NOTES
High blood sugars for the past 24 hours increased to 540 this afternoon. Decreased appetite and ambition.

## 2017-09-21 NOTE — ED PROVIDER NOTES
History     Chief Complaint   Patient presents with     Hyperglycemia     The history is provided by the patient.     Mj Ruth is a 67 year old male who presents to the ED complaining of hyperglycemia. Patient reports he was diagnosed with diabetes in 2011 and has been taking metformin ever since. Patient reports his sugars were at 200 a month ago and they have steadily increased since to 325 this morning and 450 during triage. Patient reports he hasn't felt unusual or ill. Mj notes an increased tiredness recently. Mj is taking multiple naps a day. He feels as if he has no ambition. Patient denies dysuria or and type of pain. He denies fever and cough as well. Mj of short of breath more often since quitting use of tobacco. Patient quit smoking 2 years ago, he smoked a pack per day for about 50 years. He has a decreased appetite.      Lab Results   Component Value Date    A1C 7.2 04/06/2017    A1C 6.6 10/19/2016    A1C 6.0 09/23/2015    A1C 6.7 07/01/2014    A1C 6.6 05/17/2013       I have reviewed the Medications, Allergies, Past Medical and Surgical History, and Social History in the Epic system.    Allergies:   Allergies   Allergen Reactions     No Known Drug Allergies          No current facility-administered medications on file prior to encounter.   Current Outpatient Prescriptions on File Prior to Encounter:  amLODIPine (NORVASC) 10 MG tablet Take 1 tablet (10 mg) by mouth daily   lovastatin (MEVACOR) 40 MG tablet TAKE ONE & ONE-HALF TABLETS BY MOUTH ONCE DAILY   metoprolol (LOPRESSOR) 100 MG tablet Take 1 tablet (100 mg) by mouth 2 times daily   aspirin 81 MG tablet Take 1 tablet (81 mg) by mouth daily   blood glucose monitoring (ACCU-CHEK WENDY) test strip Use to test 4 times daily before meals and bedtime.   Blood Glucose Calibration (GLUCOSE CONTROL) solution 1 drop by Test Solution route every 30 days. WENDY   Blood Glucose Monitoring Suppl (ACCU-CHEK WENDY) KIT TO TEST 5 TIMES A  DAY, BEFORE GIVING BOTH INSULIN   ACE/ARB NOT PRESCRIBED, INTENTIONAL, 1 each daily ACE & ARB not prescribed due to Intolerance (Patient not taking: Reported on 5/4/2017)   ipratropium - albuterol 0.5 mg/2.5 mg/3 mL (DUONEB) 0.5-2.5 (3) MG/3ML nebulization Take 1 vial (3 mLs) by nebulization every 4 hours as needed (Patient not taking: Reported on 4/6/2017)   tadalafil (CIALIS) 10 MG tablet Take 0.5-1 tablets (5-10 mg) by mouth daily as needed for erectile dysfunction Never use with nitroglycerin, terazosin or doxazosin.   ORDER FOR DME Neb machine for home. (Patient not taking: Reported on 4/6/2017)       Patient Active Problem List   Diagnosis     Erectile dysfunction     Shingles     Obstructive chronic bronchitis with exacerbation (H)     Health Care Home     Advanced directives, counseling/discussion     Hyperlipidemia LDL goal <100     Type 2 diabetes mellitus with other diabetic kidney complication (H)     Essential hypertension with goal blood pressure less than 140/90     Chronic obstructive pulmonary disease, unspecified COPD type (H)     Major depressive disorder, single episode, mild (H)       Past Surgical History:   Procedure Laterality Date     COLONOSCOPY N/A 11/5/2014    Procedure: COMBINED COLONOSCOPY, SINGLE OR MULTIPLE BIOPSY/POLYPECTOMY BY BIOPSY;  Surgeon: Douglas Ortiz MD;  Location:  GI     FINGER SURGERY  1998    pins and repair after caught in lathe     HC REMOVE TONSILS/ADENOIDS,<11 Y/O      T & A <11y.o.       Social History   Substance Use Topics     Smoking status: Former Smoker     Packs/day: 1.00     Years: 35.00     Types: Cigarettes     Quit date: 1/4/2011     Smokeless tobacco: Never Used     Alcohol use Yes      Comment: once monthly or less       Most Recent Immunizations   Administered Date(s) Administered     Influenza (High Dose) 3 valent vaccine 10/19/2016     Influenza (IIV3) 10/15/2011     Pneumococcal (PCV 13) 10/19/2016     Pneumococcal 23 valent 10/15/2011      "TD (ADULT, 7+) 11/14/2005     TDAP Vaccine (Adacel) 04/06/2017       BMI: Estimated body mass index is 31.43 kg/(m^2) as calculated from the following:    Height as of 4/6/17: 1.806 m (5' 11.1\").    Weight as of this encounter: 102.5 kg (226 lb).      Review of Systems   Constitutional: Positive for activity change (increased sleep, multiple naps per day, decreased ambition) and appetite change (decreased). Negative for fever.   Respiratory: Positive for shortness of breath (increased since quitting smoking 2 years ago). Negative for cough.    Gastrointestinal: Negative for abdominal pain.   Endocrine: Negative for polydipsia, polyphagia and polyuria.   Genitourinary: Negative for dysuria.   All other systems reviewed and are negative.      Physical Exam   BP: 170/89  Pulse: 77  Heart Rate: 77  Temp: 97.8  F (36.6  C)  Resp: 16  Height:  (6')  Weight: 102.5 kg (226 lb)  SpO2: 94 %  Physical Exam  Generally healthy appearing male in NAD who is active and non-toxic appearing.   Skin:  No rashes or lesions are noted on inspection of the torso, face, and upper extremities.   Head: Normocephalic, atraumatic, nontender to palpation  Eyes: PERRLA, conjunctiva and sclera clear  Ears: Bilateral TM's and canals are clear.  TM's translucent without erythema or effusion.  Nose: Nares normal and patent bilaterally.  Mucous membranes are non-erythematous and non-edematous.  No sinus tenderness.  Throat: Mucous membranes are clear.  No tonsilar hypertrophy, exudate, or erythema.  Neck: Supple.  FROM without pain.  No adenopathy.  No thyromegaly.   Heart:  RRR with normal S1 and S2.  No S3 or S4.  No murmur, rub, gallop, or click.  PMI is nondisplaced.   Lungs:  CTA bilaterally without wheezes, rales, or rhonchi.  Good breath sounds heard throughout all lung fields.  Tympanitic to percussion with no areas of dullness.   Abdomen: Positive bowel sounds in all four quadrants.  No tenderness to palpation throughout.  No rebound and no " guarding.  No hepatosplenomegaly.  No masses.   Extremities: extremities, peripheral pulses and reflexes normal, no edema, redness or tenderness in the calves or thighs, Sarah's sign is negative, no sign of DVT.     ED Course     ED Course     Procedures             Critical Care time:  none        Results for orders placed or performed during the hospital encounter of 09/21/17   XR Chest 2 Views    Narrative    XR CHEST 2 VW  9/21/2017 3:26 PM    HISTORY:  dyspnea    COMPARISON:  4/12/2012      Impression    IMPRESSION:  Negative and unchanged.     CODEY MICHELLE MD   CBC with platelets differential   Result Value Ref Range    WBC 10.8 4.0 - 11.0 10e9/L    RBC Count 4.93 4.4 - 5.9 10e12/L    Hemoglobin 14.8 13.3 - 17.7 g/dL    Hematocrit 43.2 40.0 - 53.0 %    MCV 88 78 - 100 fl    MCH 30.0 26.5 - 33.0 pg    MCHC 34.3 31.5 - 36.5 g/dL    RDW 13.5 10.0 - 15.0 %    Platelet Count 288 150 - 450 10e9/L    Diff Method Automated Method     % Neutrophils 74.2 %    % Lymphocytes 17.9 %    % Monocytes 6.0 %    % Eosinophils 1.3 %    % Basophils 0.4 %    % Immature Granulocytes 0.2 %    Absolute Neutrophil 8.0 1.6 - 8.3 10e9/L    Absolute Lymphocytes 1.9 0.8 - 5.3 10e9/L    Absolute Monocytes 0.7 0.0 - 1.3 10e9/L    Absolute Eosinophils 0.1 0.0 - 0.7 10e9/L    Absolute Basophils 0.0 0.0 - 0.2 10e9/L    Abs Immature Granulocytes 0.0 0 - 0.4 10e9/L   Comprehensive metabolic panel   Result Value Ref Range    Sodium 138 133 - 144 mmol/L    Potassium 4.2 3.4 - 5.3 mmol/L    Chloride 101 94 - 109 mmol/L    Carbon Dioxide 25 20 - 32 mmol/L    Anion Gap 12 3 - 14 mmol/L    Glucose 525 (HH) 70 - 99 mg/dL    Urea Nitrogen 15 7 - 30 mg/dL    Creatinine 1.07 0.66 - 1.25 mg/dL    GFR Estimate 69 >60 mL/min/1.7m2    GFR Estimate If Black 83 >60 mL/min/1.7m2    Calcium 8.7 8.5 - 10.1 mg/dL    Bilirubin Total 0.4 0.2 - 1.3 mg/dL    Albumin 3.5 3.4 - 5.0 g/dL    Protein Total 7.2 6.8 - 8.8 g/dL    Alkaline Phosphatase 145 40 - 150 U/L    ALT  26 0 - 70 U/L    AST 8 0 - 45 U/L   TSH with free T4 reflex   Result Value Ref Range    TSH 1.19 0.40 - 4.00 mU/L   Hemoglobin A1c   Result Value Ref Range    Hemoglobin A1C 11.0 (H) 4.3 - 6.0 %   Blood gas venous   Result Value Ref Range    Ph Venous 7.35 7.32 - 7.43 pH    PCO2 Venous 53 (H) 40 - 50 mm Hg    PO2 Venous 25 25 - 47 mm Hg    Bicarbonate Venous 29 (H) 21 - 28 mmol/L    Base Excess Venous 2.5 mmol/L    FIO2 21             Results for orders placed or performed during the hospital encounter of 09/21/17 (from the past 24 hour(s))   CBC with platelets differential   Result Value Ref Range    WBC 10.8 4.0 - 11.0 10e9/L    RBC Count 4.93 4.4 - 5.9 10e12/L    Hemoglobin 14.8 13.3 - 17.7 g/dL    Hematocrit 43.2 40.0 - 53.0 %    MCV 88 78 - 100 fl    MCH 30.0 26.5 - 33.0 pg    MCHC 34.3 31.5 - 36.5 g/dL    RDW 13.5 10.0 - 15.0 %    Platelet Count 288 150 - 450 10e9/L    Diff Method Automated Method     % Neutrophils 74.2 %    % Lymphocytes 17.9 %    % Monocytes 6.0 %    % Eosinophils 1.3 %    % Basophils 0.4 %    % Immature Granulocytes 0.2 %    Absolute Neutrophil 8.0 1.6 - 8.3 10e9/L    Absolute Lymphocytes 1.9 0.8 - 5.3 10e9/L    Absolute Monocytes 0.7 0.0 - 1.3 10e9/L    Absolute Eosinophils 0.1 0.0 - 0.7 10e9/L    Absolute Basophils 0.0 0.0 - 0.2 10e9/L    Abs Immature Granulocytes 0.0 0 - 0.4 10e9/L   Comprehensive metabolic panel   Result Value Ref Range    Sodium 138 133 - 144 mmol/L    Potassium 4.2 3.4 - 5.3 mmol/L    Chloride 101 94 - 109 mmol/L    Carbon Dioxide 25 20 - 32 mmol/L    Anion Gap 12 3 - 14 mmol/L    Glucose 525 (HH) 70 - 99 mg/dL    Urea Nitrogen 15 7 - 30 mg/dL    Creatinine 1.07 0.66 - 1.25 mg/dL    GFR Estimate 69 >60 mL/min/1.7m2    GFR Estimate If Black 83 >60 mL/min/1.7m2    Calcium 8.7 8.5 - 10.1 mg/dL    Bilirubin Total 0.4 0.2 - 1.3 mg/dL    Albumin 3.5 3.4 - 5.0 g/dL    Protein Total 7.2 6.8 - 8.8 g/dL    Alkaline Phosphatase 145 40 - 150 U/L    ALT 26 0 - 70 U/L    AST 8 0 -  45 U/L   TSH with free T4 reflex   Result Value Ref Range    TSH 1.19 0.40 - 4.00 mU/L   Hemoglobin A1c   Result Value Ref Range    Hemoglobin A1C 11.0 (H) 4.3 - 6.0 %   XR Chest 2 Views    Narrative    XR CHEST 2 VW  9/21/2017 3:26 PM    HISTORY:  dyspnea    COMPARISON:  4/12/2012      Impression    IMPRESSION:  Negative and unchanged.     CODEY MICHELLE MD   Blood gas venous   Result Value Ref Range    Ph Venous 7.35 7.32 - 7.43 pH    PCO2 Venous 53 (H) 40 - 50 mm Hg    PO2 Venous 25 25 - 47 mm Hg    Bicarbonate Venous 29 (H) 21 - 28 mmol/L    Base Excess Venous 2.5 mmol/L    FIO2 21        Medications - No data to display    Assessments & Plan (with Medical Decision Making)     Type 2 diabetes mellitus with other diabetic kidney complication (H)  Chronic obstructive pulmonary disease, unspecified COPD type (H)  Type 2 diabetes mellitus without complication, without long-term current use of insulin (H)     67 year old male presents presents for evaluation of increasing fatigue, periods of dyspnea, and worsening blood glucose levels over the past 2 weeks. He states that his blood sugars generally average less than 200, but he has had readings into the 300 range in the last 1 week. He presented to the ED today when his blood sugar was in the upper 300 range. She denies any polyphagia, polydipsia, polyuria, or significant weight change. No fevers, chills, nausea, vomiting, diarrhea, cough, chest pain, or any other signs of infection. No recent change in his diet or activity levels. He continues to take his regular medications as scheduled. On exam blood pressure 149/87, pulse 77, temperature 97.8, and oxygen saturations of 95%. Exam with some expiratory wheezing on the right but otherwise the exam is completely normal as noted above.  Chest x-ray with no mass lesion or infiltrate. Laboratory levels with normal CBC, TSH, electrolytes, and hepatic levels. Blood sugar significantly elevated to 525 with an elevated  hemoglobin A1c of 11%. PH normal at 7.35. PCO2 elevated to 53 likely secondary to his underlying COPD. Patient does not have any symptoms, exam findings, or laboratory findings suggestive of DKA. He appears to have worsening of his type 2 diabetes with hyperglycemia and underlying COPD. He has very minimal symptoms of COPD at this time, and does not require any intervention with inhalers currently. His current medication regimen is not managing his diabetes well. We are going to increase his metformin to 1000 mg twice daily, as he has normal renal function and has not had troubles with GI intolerance on this medication.  His sulfonylurea medication will be added to his current regimen for better glycemic control.  We started Glyburide 2.5 mg prior to breakfast. We discussed and improved diet and recommended a diabetes education refresher course. His PCP can discuss this with him and order this for him.   Check blood sugars 3 times daily at a minimum and keep a blood sugar log. Bring this with to his PCP appointment in 1 week.  Return to the ED prior to then if he has development of systemic symptoms. These were discussed with him in detail. No current signs of end organ damage. The patient was in agreement with this plan and was suitable for discharge.      I have reviewed the nursing notes.    I have reviewed the findings, diagnosis, plan and need for follow up with the patient.       Discharge Medication List as of 9/21/2017  4:51 PM      START taking these medications    Details   glyBURIDE (DIABETA /MICRONASE) 2.5 MG tablet Take 1 tablet (2.5 mg) by mouth daily (with breakfast), Disp-30 tablet, R-0, E-Prescribe             Final diagnoses:   Type 2 diabetes mellitus with other diabetic kidney complication (H)   Chronic obstructive pulmonary disease, unspecified COPD type (H)     This document serves as a record of services personally performed by Hunter Zhao PA. It was created on their behalf by Alfonso  Kwasi a trained medical scribe. The creation of this record is based on the provider's personal observations and the statements of the patient. This document has been checked and approved by the attending provider.    Note: Chart documentation done in part with Dragon Voice Recognition software. Although reviewed after completion, some word and grammatical errors may remain.    9/21/2017   Hunter Zhao PA-C   [facility] EMERGENCY DEPARTMENT     Hunter Zhao PA-C  09/22/17 0035

## 2017-09-27 ENCOUNTER — TELEPHONE (OUTPATIENT)
Dept: FAMILY MEDICINE | Facility: CLINIC | Age: 67
End: 2017-09-27

## 2017-09-27 ENCOUNTER — OFFICE VISIT (OUTPATIENT)
Dept: FAMILY MEDICINE | Facility: CLINIC | Age: 67
End: 2017-09-27
Payer: COMMERCIAL

## 2017-09-27 VITALS
DIASTOLIC BLOOD PRESSURE: 74 MMHG | TEMPERATURE: 98.3 F | SYSTOLIC BLOOD PRESSURE: 132 MMHG | RESPIRATION RATE: 16 BRPM | BODY MASS INDEX: 32.48 KG/M2 | WEIGHT: 233.5 LBS | OXYGEN SATURATION: 97 % | HEART RATE: 82 BPM

## 2017-09-27 DIAGNOSIS — Z23 NEED FOR PROPHYLACTIC VACCINATION AND INOCULATION AGAINST INFLUENZA: Primary | ICD-10-CM

## 2017-09-27 DIAGNOSIS — E11.29 TYPE 2 DIABETES MELLITUS WITH OTHER DIABETIC KIDNEY COMPLICATION (H): ICD-10-CM

## 2017-09-27 LAB
CREAT UR-MCNC: 320 MG/DL
MICROALBUMIN UR-MCNC: 80 MG/L
MICROALBUMIN/CREAT UR: 24.97 MG/G CR (ref 0–17)

## 2017-09-27 PROCEDURE — G0008 ADMIN INFLUENZA VIRUS VAC: HCPCS | Performed by: FAMILY MEDICINE

## 2017-09-27 PROCEDURE — 90662 IIV NO PRSV INCREASED AG IM: CPT | Performed by: FAMILY MEDICINE

## 2017-09-27 PROCEDURE — 82043 UR ALBUMIN QUANTITATIVE: CPT | Performed by: FAMILY MEDICINE

## 2017-09-27 PROCEDURE — 99214 OFFICE O/P EST MOD 30 MIN: CPT | Mod: 25 | Performed by: FAMILY MEDICINE

## 2017-09-27 RX ORDER — GLYBURIDE-METFORMIN HYDROCHLORIDE 5; 500 MG/1; MG/1
2 TABLET ORAL 2 TIMES DAILY WITH MEALS
Qty: 120 TABLET | Refills: 3 | Status: SHIPPED | OUTPATIENT
Start: 2017-09-27 | End: 2018-04-16

## 2017-09-27 RX ORDER — GLYBURIDE 5 MG/1
10 TABLET ORAL 2 TIMES DAILY WITH MEALS
Qty: 60 TABLET | Refills: 1 | Status: SHIPPED | OUTPATIENT
Start: 2017-09-27 | End: 2017-10-25

## 2017-09-27 RX ORDER — GLYBURIDE 5 MG/1
10 TABLET ORAL 2 TIMES DAILY WITH MEALS
Qty: 31 TABLET | Refills: 1 | Status: SHIPPED | OUTPATIENT
Start: 2017-09-27 | End: 2017-09-27

## 2017-09-27 ASSESSMENT — PAIN SCALES - GENERAL: PAINLEVEL: NO PAIN (0)

## 2017-09-27 NOTE — MR AVS SNAPSHOT
After Visit Summary   9/27/2017    Mj Ruth    MRN: [id number]           Patient Information     Date Of Birth          1950        Visit Information        Provider Department      9/27/2017 8:50 AM Thomas Bosch MD [facility]        Today's Diagnoses     Need for prophylactic vaccination and inoculation against influenza    -  1    Type 2 diabetes mellitus with other diabetic kidney complication (H)           Follow-ups after your visit        Your next 10 appointments already scheduled     Oct 31, 2017  9:20 AM CDT   Office Visit with [facility] Bosch MD   [facility]    [address] [place] [address] [id number] [phone]           Bring a current list of meds and any records pertaining to this visit. For Physicals, please bring immunization records and any forms needing to be filled out. Please arrive 10 minutes early to complete paperwork.              Who to contact     If you have questions or need follow up information about today's clinic visit or your schedule please contact [facility] directly at [phone].  Normal or non-critical lab and imaging results will be communicated to you by aaTaghart, letter or phone within 4 business days after the clinic has received the results. If you do not hear from us within 7 days, please contact the clinic through [employer] or phone. If you have a critical or abnormal lab result, we will notify you by phone as soon as possible.  Submit refill requests through [employer] or call your pharmacy and they will forward the refill request to us. Please allow 3 business days for your refill to be completed.          Additional Information About Your Visit        MyChart Information     [employer] lets you send messages to your doctor, view your test results, renew your prescriptions, schedule appointments and more. To sign up, go to [email or web address] . Click on  "\"Log in\" on the left side of the screen, which will take you to the Welcome page. Then click on \"Sign up Now\" on the right side of the page.     You will be asked to enter the access code listed below, as well as some personal information. Please follow the directions to create your username and password.     Your access code is: [email or web address]  Expires: 2017  4:50 PM     Your access code will  in 90 days. If you need help or a new code, please call your [facility] clinic or [phone].        Care EveryWhere ID     This is your Care EveryWhere ID. This could be used by other organizations to access your [facility] medical records  [id number]        Your Vitals Were     Pulse Temperature Respirations Pulse Oximetry BMI (Body Mass Index)       82 98.3  F (36.8  C) (Tympanic) 16 97% 32.48 kg/m2        Blood Pressure from Last 3 Encounters:   17 132/74   17 149/87   17 150/74    Weight from Last 3 Encounters:   17 233 lb 8 oz (105.9 kg)   17 226 lb (102.5 kg)   17 239 lb (108.4 kg)              We Performed the Following     Albumin Random Urine Quantitative with Creat Ratio     FLU VACCINE, INCREASED ANTIGEN, PRESV FREE, AGE 65+ [11241]     Vaccine Administration, Initial [05206]          Today's Medication Changes          These changes are accurate as of: 17  3:41 PM.  If you have any questions, ask your nurse or doctor.               Start taking these medicines.        Dose/Directions    glyBURIDE-metFORMIN 5-500 MG per tablet   Commonly known as:  GLUCOVANCE   Used for:  Type 2 diabetes mellitus with other diabetic kidney complication (H)   Started by:  Thomas Bosch MD        Dose:  2 tablet   Take 2 tablets by mouth 2 times daily (with meals)   Quantity:  120 tablet   Refills:  3         These medicines have changed or have updated prescriptions.        Dose/Directions    glyBURIDE 5 MG tablet   Commonly known as:  DIABETA /MICRONASE   This may have changed: "    - medication strength  - how much to take  - when to take this   Used for:  Type 2 diabetes mellitus with other diabetic kidney complication (H)   Changed by:  Thomas Bosch MD        Dose:  10 mg   Take 2 tablets (10 mg) by mouth 2 times daily (with meals)   Quantity:  60 tablet   Refills:  1            Where to get your medicines      These medications were sent to [facility] [address] [place] [address] - [address]  [address], [facility] [address]     Phone:  [phone]     glyBURIDE 5 MG tablet         Some of these will need a paper prescription and others can be bought over the counter.  Ask your nurse if you have questions.     Bring a paper prescription for each of these medications     glyBURIDE-metFORMIN 5-500 MG per tablet                Primary Care Provider Office Phone # Fax #    Thomas Bosch [phone] [address] [place] [address]        Equal Access to Services     [facility]: Hadii aad ku hadasho Someka, waaxda luqadaha, qaybta kaalmada adeegyada, adam estrada hayvaldez quintanilla . So [facility] [phone].    ATENCIÓN: Si habla español, tiene a meyer disposición servicios gratuitos de asistencia lingüística. Llame al [phone].    We comply with applicable federal civil rights laws and [place] laws. We do not discriminate on the basis of race, color, national origin, age, disability sex, sexual orientation or gender identity.            Thank you!     Thank you for choosing [facility]  for your care. Our goal is always to provide you with excellent care. Hearing back from our patients is one way we can continue to improve our services. Please take a few minutes to complete the written survey that you may receive in the mail after your visit with us. Thank you!             Your Updated Medication List - Protect others around you: Learn how to safely use, store and throw away your medicines at www.disposemymeds.org.           This list is accurate as of: 9/27/17  3:41 PM.  Always use your most recent med list.                   Brand Name Dispense Instructions for use Diagnosis    ACCU-CHEK WENDY Kit     1 kit    TO TEST 5 TIMES A DAY, BEFORE GIVING BOTH INSULIN    Diabetes mellitus, type 2 (H)       ACE/ARB NOT PRESCRIBED (INTENTIONAL)      1 each daily ACE & ARB not prescribed due to Intolerance    Type 2 diabetes mellitus without complication, without long-term current use of insulin (H)       amLODIPine 10 MG tablet    NORVASC    90 tablet    Take 1 tablet (10 mg) by mouth daily    Hypertension goal BP (blood pressure) < 140/90       aspirin 81 MG tablet     30 tablet    Take 1 tablet (81 mg) by mouth daily    Type 2 diabetes mellitus without complication, without long-term current use of insulin (H)       blood glucose calibration solution    no brand specified    1 Bottle    1 drop by Test Solution route every 30 days. WENDY    Type 2 diabetes, HbA1c goal < 7% (H)       blood glucose monitoring test strip    ACCU-CHEK WENDY    3 Box    Use to test 4 times daily before meals and bedtime.    Type 2 diabetes mellitus without complication (H)       glyBURIDE 5 MG tablet    DIABETA /MICRONASE    60 tablet    Take 2 tablets (10 mg) by mouth 2 times daily (with meals)    Type 2 diabetes mellitus with other diabetic kidney complication (H)       glyBURIDE-metFORMIN 5-500 MG per tablet    GLUCOVANCE    120 tablet    Take 2 tablets by mouth 2 times daily (with meals)    Type 2 diabetes mellitus with other diabetic kidney complication (H)       ipratropium - albuterol 0.5 mg/2.5 mg/3 mL 0.5-2.5 (3) MG/3ML neb solution    DUONEB    1 Box    Take 1 vial (3 mLs) by nebulization every 4 hours as needed    Obstructive chronic bronchitis with exacerbation (H)       lovastatin 40 MG tablet    MEVACOR    135 tablet    TAKE ONE & ONE-HALF TABLETS BY MOUTH ONCE DAILY    Hyperlipidemia LDL goal <100, Essential hypertension with goal blood pressure  less than 140/90       metFORMIN 500 MG tablet    GLUCOPHAGE    360 tablet    Take 2 tablets (1,000 mg) by mouth 2 times daily (with meals)    Type 2 diabetes mellitus without complication, without long-term current use of insulin (H)       metoprolol 100 MG tablet    LOPRESSOR    180 tablet    Take 1 tablet (100 mg) by mouth 2 times daily    Hyperlipidemia LDL goal <100, Type 2 diabetes mellitus with other diabetic kidney complication (H), Essential hypertension with goal blood pressure less than 140/90       order for DME     1 Device    Neb machine for home.    Wheezing, Viral bronchitis       tadalafil 10 MG tablet    CIALIS    5 tablet    Take 0.5-1 tablets (5-10 mg) by mouth daily as needed for erectile dysfunction Never use with nitroglycerin, terazosin or doxazosin.    Erectile dysfunction

## 2017-09-27 NOTE — PROGRESS NOTES
SUBJECTIVE:   Mj Ruth is a 67 year old male who presents to clinic today for the following health issues:      ED/UC Followup:    Facility:  [facility]  Date of visit: 09/21/17  Reason for visit: elevated glucose  Current Status: he feels fine. Readings have been lowest 131, highest 518             Problem list and histories reviewed & adjusted, as indicated.  Additional history: as documented, the patient is here today after ER visit for elevated blood sugar. He states that over the past month or so his blood sugars at then in the 200s and slowly drifted into the 300s and eventually got to 500 he called the R triage line and was told to go to emergency room to the emergency room they found his blood sugar to be in the 500 range. They sent him home on DiaBeta 2.5 mg daily and increased his Glucophage to 500 mg twice daily. I did have long talk with patient about exercise and diet. And the fact we need to get more aggressive with his diabetes. I did answer his questions his satisfaction.    Recent Labs   Lab Test  09/21/17   1514  04/06/17   1057  10/19/16   0905   10/04/15   0556  10/03/15   1545  09/23/15   0929  07/01/14   0814   A1C  11.0*  7.2*  6.6*   --    --    --   6.0  6.7*   LDL   --    --   97   --    --    --   88  89   HDL   --    --   42   --    --    --   37*  28*   TRIG   --    --   174*   --    --    --   175*  227*   ALT  26   --    --    --   13  14   --   31   CR  1.07   --   1.21   < >  1.12  1.18  1.05  1.23   GFRESTIMATED  69   --   60*   < >  66  62  71  59*   GFRESTBLACK  83   --   72   < >  79  75  85  72   POTASSIUM  4.2   --   4.4   < >  3.7  3.7   --   4.0   TSH  1.19   --   2.29   --    --    --    --   3.11    < > = values in this interval not displayed.          Reviewed and updated as needed this visit by clinical staff       Reviewed and updated as needed this visit by Provider         ROS:  Constitutional, HEENT, cardiovascular, pulmonary, gi and gu systems are  negative, except as otherwise noted.      OBJECTIVE:   /74 (BP Location: Left arm, Patient Position: Chair, Cuff Size: Adult Large)  Pulse 82  Temp 98.3  F (36.8  C) (Tympanic)  Resp 16  Wt 233 lb 8 oz (105.9 kg)  SpO2 97%  BMI 32.48 kg/m2  Body mass index is 32.48 kg/(m^2).  GENERAL: healthy, alert and no distress  NECK: no adenopathy, no asymmetry, masses, or scars and thyroid normal to palpation  RESP: lungs clear to auscultation - no rales, rhonchi or wheezes  CV: regular rate and rhythm, normal S1 S2, no S3 or S4, no murmur, click or rub, no peripheral edema and peripheral pulses strong  ABDOMEN: soft, nontender, no hepatosplenomegaly, no masses and bowel sounds normal  MS: no gross musculoskeletal defects noted, no edema  NEURO: Normal strength and tone, mentation intact and speech normal  BACK: no CVA tenderness, no paralumbar tenderness  PSYCH: mentation appears normal, affect normal/bright    Diagnostic Test Results:  Results for orders placed or performed in visit on 09/27/17 (from the past 24 hour(s))   Albumin Random Urine Quantitative with Creat Ratio   Result Value Ref Range    Creatinine Urine 320 mg/dL    Albumin Urine mg/L 80 mg/L    Albumin Urine mg/g Cr 24.97 (H) 0 - 17 mg/g Cr          ASSESSMENT/PLAN:     Encounter Diagnoses   Name Primary?     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 q. day and increase his metformin to 1000 mg p.o. b.i.d. I will see him back in 1 month's time for recheck of his blood sugars and possibly a hemoglobin A1c he will contact me if any any questions or concerns regarding his blood sugars prior to his follow-up.          1. Need for prophylactic vaccination and inoculation against influenza    - FLU VACCINE, INCREASED ANTIGEN, PRESV FREE, AGE 65+ [01213]  - Vaccine Administration, Initial [21391]    2. Type  2 diabetes mellitus with other diabetic kidney complication (H)    - Albumin Random Urine Quantitative with Creat Ratio  - glyBURIDE-metFORMIN (GLUCOVANCE) 5-500 MG per tablet; Take 2 tablets by mouth 2 times daily (with meals)  Dispense: 120 tablet; Refill: 3  - glyBURIDE (DIABETA /MICRONASE) 5 MG tablet; Take 2 tablets (10 mg) by mouth 2 times daily (with meals)  Dispense: 60 tablet; Refill: 1    Work on weight loss  Regular exercise    Thomas Bosch MD, MD  [facility]  Injectable Influenza Immunization Documentation    1.  Is the person to be vaccinated sick today?   No    2. Does the person to be vaccinated have an allergy to a component   of the vaccine?   No    3. Has the person to be vaccinated ever had a serious reaction   to influenza vaccine in the past?   No    4. Has the person to be vaccinated ever had Guillain-Barré syndrome?   No    Form completed by Madelyn Costa [facility]

## 2017-09-27 NOTE — NURSING NOTE
Prior to injection verified patient identity using patient's name and date of birth.   Patient instructed to remain in clinic for 20 minutes afterwards and to report any adverse reaction to me immediately.  Madelyn Costa, [facility]

## 2017-09-29 ENCOUNTER — TELEPHONE (OUTPATIENT)
Dept: FAMILY MEDICINE | Facility: CLINIC | Age: 67
End: 2017-09-29

## 2017-09-29 ASSESSMENT — PATIENT HEALTH QUESTIONNAIRE - PHQ9: SUM OF ALL RESPONSES TO PHQ QUESTIONS 1-9: 0

## 2017-09-29 NOTE — TELEPHONE ENCOUNTER
I have attempted to call the pt to update PHQ-9. I left message for pt to call back. I will call back another time. Twyla Friedman CMA ([facility])

## 2017-09-29 NOTE — TELEPHONE ENCOUNTER
Patient is due for a PHQ-9. Pt was seen 9/27/2017 and PHQ-9 was not given to pt to complete.  Index start date:9/06/2017  Index end date:11/06/2017    Please call patient.

## 2017-10-02 ENCOUNTER — TELEPHONE (OUTPATIENT)
Dept: FAMILY MEDICINE | Facility: CLINIC | Age: 67
End: 2017-10-02

## 2017-10-02 ENCOUNTER — ALLIED HEALTH/NURSE VISIT (OUTPATIENT)
Dept: FAMILY MEDICINE | Facility: CLINIC | Age: 67
End: 2017-10-02
Payer: COMMERCIAL

## 2017-10-02 VITALS — SYSTOLIC BLOOD PRESSURE: 132 MMHG | HEART RATE: 64 BPM | DIASTOLIC BLOOD PRESSURE: 82 MMHG

## 2017-10-02 DIAGNOSIS — I10 ESSENTIAL HYPERTENSION WITH GOAL BLOOD PRESSURE LESS THAN 140/90: Primary | ICD-10-CM

## 2017-10-02 PROCEDURE — 99207 ZZC NO CHARGE NURSE ONLY: CPT

## 2017-10-02 NOTE — NURSING NOTE
"Mj Ruth is a 67 year old male who comes in today for a Blood Pressure check because of ongoing blood pressure monitoring.    *Document pulse and BP  *Use new set of vitals button for multiple readings.  *Use extended vitals for orthostatic    Vitals as recorded, a regular cuff was used.    Patient is taking medication as prescribed  Patient is tolerating medications well.  Patient is monitoring Blood Pressure at home.  Average readings if yes are \"it was 133/74 this morning\"    Current complaints: none    Disposition: follow-up as indicated by MD/KEITH Harding CMA ([facility])      "

## 2017-10-02 NOTE — MR AVS SNAPSHOT
"              After Visit Summary   10/2/2017    Mj Ruth    MRN: [id number]           Patient Information     Date Of Birth          1950        Visit Information        Provider Department      10/2/2017 2:45 PM NL FLOAT TEAM D [facility]        Today's Diagnoses     Essential hypertension with goal blood pressure less than 140/90    -  1       Follow-ups after your visit        Your next 10 appointments already scheduled     Oct 31, 2017  9:20 AM CDT   Office Visit with Thomas Bosch MD   [facility]    [address] [place] [address] [id number] [phone]           Bring a current list of meds and any records pertaining to this visit. For Physicals, please bring immunization records and any forms needing to be filled out. Please arrive 10 minutes early to complete paperwork.              Who to contact     If you have questions or need follow up information about today's clinic visit or your schedule please contact [facility] directly at [phone].  Normal or non-critical lab and imaging results will be communicated to you by [employer], letter or phone within 4 business days after the clinic has received the results. If you do not hear from us within 7 days, please contact the clinic through [employer] or phone. If you have a critical or abnormal lab result, we will notify you by phone as soon as possible.  Submit refill requests through [employer] or call your pharmacy and they will forward the refill request to us. Please allow 3 business days for your refill to be completed.          Additional Information About Your Visit        [employer] Information     [employer] lets you send messages to your doctor, view your test results, renew your prescriptions, schedule appointments and more. To sign up, go to [email or web address] . Click on \"Log in\" on the left side of the screen, which will take you to the Welcome page. " "Then click on \"Sign up Now\" on the right side of the page.     You will be asked to enter the access code listed below, as well as some personal information. Please follow the directions to create your username and password.     Your access code is: [email or web address]  Expires: 2017  4:50 PM     Your access code will  in 90 days. If you need help or a new code, please call your [place] clinic or [phone].        Care EveryWhere ID     This is your Care EveryWhere ID. This could be used by other organizations to access your [place] medical records  [id number]        Your Vitals Were     Pulse                   64            Blood Pressure from Last 3 Encounters:   10/02/17 132/82   17 132/74   17 149/87    Weight from Last 3 Encounters:   17 233 lb 8 oz (105.9 kg)   17 226 lb (102.5 kg)   17 239 lb (108.4 kg)              Today, you had the following     No orders found for display       Primary Care Provider Office Phone # Fax #    Thomas Bosch [phone] [address] [place] [address]        Equal Access to Services     REAL JESSICA AH: Hadii aad ku hadasho Soomaali, waaxda luqadaha, qaybta kaalmada adeegyada, adam estrada hayorinn lizbeth lawrence. So [facility] [phone].    ATENCIÓN: Si habla español, tiene a meyer disposición servicios gratuitos de asistencia lingüística. Llame al [phone].    We comply with applicable federal civil rights laws and [place] laws. We do not discriminate on the basis of race, color, national origin, age, disability, sex, sexual orientation, or gender identity.            Thank you!     Thank you for choosing [facility]  for your care. Our goal is always to provide you with excellent care. Hearing back from our patients is one way we can continue to improve our services. Please take a few minutes to complete the written survey that you may receive in the mail after your visit with us. " Thank you!             Your Updated Medication List - Protect others around you: Learn how to safely use, store and throw away your medicines at www.disposemymeds.org.          This list is accurate as of: 10/2/17  2:49 PM.  Always use your most recent med list.                   Brand Name Dispense Instructions for use Diagnosis    ACCU-CHEK WENDY Kit     1 kit    TO TEST 5 TIMES A DAY, BEFORE GIVING BOTH INSULIN    Diabetes mellitus, type 2 (H)       ACE/ARB NOT PRESCRIBED (INTENTIONAL)      1 each daily ACE & ARB not prescribed due to Intolerance    Type 2 diabetes mellitus without complication, without long-term current use of insulin (H)       amLODIPine 10 MG tablet    NORVASC    90 tablet    Take 1 tablet (10 mg) by mouth daily    Hypertension goal BP (blood pressure) < 140/90       aspirin 81 MG tablet     30 tablet    Take 1 tablet (81 mg) by mouth daily    Type 2 diabetes mellitus without complication, without long-term current use of insulin (H)       blood glucose calibration solution    no brand specified    1 Bottle    1 drop by Test Solution route every 30 days. WENDY    Type 2 diabetes, HbA1c goal < 7% (H)       blood glucose monitoring test strip    ACCU-CHEK WENDY    3 Box    Use to test 4 times daily before meals and bedtime.    Type 2 diabetes mellitus without complication (H)       glyBURIDE 5 MG tablet    DIABETA /MICRONASE    60 tablet    Take 2 tablets (10 mg) by mouth 2 times daily (with meals)    Type 2 diabetes mellitus with other diabetic kidney complication       glyBURIDE-metFORMIN 5-500 MG per tablet    GLUCOVANCE    120 tablet    Take 2 tablets by mouth 2 times daily (with meals)    Type 2 diabetes mellitus with other diabetic kidney complication       ipratropium - albuterol 0.5 mg/2.5 mg/3 mL 0.5-2.5 (3) MG/3ML neb solution    DUONEB    1 Box    Take 1 vial (3 mLs) by nebulization every 4 hours as needed    Obstructive chronic bronchitis with exacerbation (H)       lovastatin 40 MG  tablet    MEVACOR    135 tablet    TAKE ONE & ONE-HALF TABLETS BY MOUTH ONCE DAILY    Hyperlipidemia LDL goal <100, Essential hypertension with goal blood pressure less than 140/90       metFORMIN 500 MG tablet    GLUCOPHAGE    360 tablet    Take 2 tablets (1,000 mg) by mouth 2 times daily (with meals)    Type 2 diabetes mellitus without complication, without long-term current use of insulin (H)       metoprolol 100 MG tablet    LOPRESSOR    180 tablet    Take 1 tablet (100 mg) by mouth 2 times daily    Hyperlipidemia LDL goal <100, Type 2 diabetes mellitus with other diabetic kidney complication, Essential hypertension with goal blood pressure less than 140/90       order for DME     1 Device    Neb machine for home.    Wheezing, Viral bronchitis       tadalafil 10 MG tablet    CIALIS    5 tablet    Take 0.5-1 tablets (5-10 mg) by mouth daily as needed for erectile dysfunction Never use with nitroglycerin, terazosin or doxazosin.    Erectile dysfunction

## 2017-10-02 NOTE — TELEPHONE ENCOUNTER
Panel Management Review      Patient has the following on his problem list:     Depression / Dysthymia review    Measure:  Needs PHQ-9 score of 4 or less during index window.  Administer PHQ-9 and if score is 5 or more, send encounter to provider for next steps.    5 - 7 month window range:     PHQ-9 SCORE 5/4/2017 6/1/2017 9/29/2017   Total Score - - -   Total Score 9 12 0       If PHQ-9 recheck is 5 or more, route to provider for next steps.    Patient is due for:  none    Diabetes    ASA: Passed    Last A1C  Lab Results   Component Value Date    A1C 11.0 09/21/2017    A1C 7.2 04/06/2017    A1C 6.6 10/19/2016    A1C 6.0 09/23/2015    A1C 6.7 07/01/2014     A1C tested: FAILED    Last LDL:    Lab Results   Component Value Date    CHOL 174 10/19/2016     Lab Results   Component Value Date    HDL 42 10/19/2016     Lab Results   Component Value Date    LDL 97 10/19/2016     Lab Results   Component Value Date    TRIG 174 10/19/2016     Lab Results   Component Value Date    CHOLHDLRATIO 4.3 09/23/2015     Lab Results   Component Value Date    NHDL 132 10/19/2016       Is the patient on a Statin? YES             Is the patient on Aspirin? YES    Medications     HMG CoA Reductase Inhibitors    lovastatin (MEVACOR) 40 MG tablet    Salicylates    aspirin 81 MG tablet          Last three blood pressure readings:  BP Readings from Last 3 Encounters:   09/27/17 132/74   09/21/17 149/87   06/01/17 150/74       Date of last diabetes office visit: 09/27/17 ED fu     Tobacco History:     History   Smoking Status     Former Smoker     Packs/day: 1.00     Years: 35.00     Types: Cigarettes     Quit date: 1/4/2011   Smokeless Tobacco     Never Used         Hypertension   Last three blood pressure readings:  BP Readings from Last 3 Encounters:   09/27/17 132/74   09/21/17 149/87   06/01/17 150/74     Blood pressure: FAILED    HTN Guidelines:  Age 18-59 BP range:  Less than 140/90  Age 60-85 with Diabetes:  Less than 140/90  Age 60-85  without Diabetes:  less than 150/90            Composite cancer screening  Chart review shows that this patient is due/due soon for the following None  Summary:    Patient is due/failing the following:   BP CHECK    Action needed:   Patient needs nurse only appointment.    Type of outreach:    Spoke with patient and set up nurse only appt for today.    Questions for provider review:    None                                                                                                                                    Madelyn Costa, [facility]       Chart routed to none   .

## 2017-10-25 ENCOUNTER — OFFICE VISIT (OUTPATIENT)
Dept: FAMILY MEDICINE | Facility: CLINIC | Age: 67
End: 2017-10-25
Payer: COMMERCIAL

## 2017-10-25 VITALS
HEART RATE: 77 BPM | BODY MASS INDEX: 32.72 KG/M2 | TEMPERATURE: 97.3 F | OXYGEN SATURATION: 99 % | DIASTOLIC BLOOD PRESSURE: 80 MMHG | SYSTOLIC BLOOD PRESSURE: 136 MMHG | WEIGHT: 235.25 LBS | RESPIRATION RATE: 16 BRPM

## 2017-10-25 DIAGNOSIS — E78.5 HYPERLIPIDEMIA LDL GOAL <100: Primary | ICD-10-CM

## 2017-10-25 DIAGNOSIS — R73.09 ELEVATED GLUCOSE: ICD-10-CM

## 2017-10-25 DIAGNOSIS — R73.09 ELEVATED HEMOGLOBIN A1C: ICD-10-CM

## 2017-10-25 DIAGNOSIS — Z23 NEED FOR VACCINATION: ICD-10-CM

## 2017-10-25 PROCEDURE — 99207 ZZC FOOT EXAM  NO CHARGE: CPT | Mod: 25 | Performed by: FAMILY MEDICINE

## 2017-10-25 PROCEDURE — 99214 OFFICE O/P EST MOD 30 MIN: CPT | Mod: 25 | Performed by: FAMILY MEDICINE

## 2017-10-25 PROCEDURE — 90732 PPSV23 VACC 2 YRS+ SUBQ/IM: CPT | Performed by: FAMILY MEDICINE

## 2017-10-25 PROCEDURE — G0009 ADMIN PNEUMOCOCCAL VACCINE: HCPCS | Performed by: FAMILY MEDICINE

## 2017-10-25 ASSESSMENT — PAIN SCALES - GENERAL: PAINLEVEL: NO PAIN (0)

## 2017-10-25 NOTE — NURSING NOTE
Prior to injection verified patient identity using patient's name and date of birth.   Patient instructed to remain in clinic for 20 minutes afterwards and to report any adverse reaction to me immediately.  Madelyn Costa, [facility]

## 2017-10-25 NOTE — MR AVS SNAPSHOT
After Visit Summary   10/25/2017    Mj Ruth    MRN: [id number]           Patient Information     Date Of Birth          1950        Visit Information        Provider Department      10/25/2017 10:50 AM Thomas Bosch MD [facility]        Today's Diagnoses     Hyperlipidemia LDL goal <100    -  1    Need for vaccination        Elevated hemoglobin A1c        Elevated glucose           Follow-ups after your visit        Your next 10 appointments already scheduled     Oct 31, 2017  9:00 AM CDT   LAB with NL LAB PMC   [facility] ([facility])    [address] [place] [id number] [phone]           Patient must bring picture ID. Patient should be prepared to give a urine specimen  Please do not eat 10-12 hours before your appointment if you are coming in fasting for labs on lipids, cholesterol, or glucose (sugar). Pregnant women should follow their Care Team instructions. Water with medications is okay. Do not drink coffee or other fluids. If you have concerns about taking  your medications, please ask at office or if scheduling via [employer], send a message by clicking on Secure Messaging, Message Your Care Team.              Future tests that were ordered for you today     Open Future Orders        Priority Expected Expires Ordered    Lipid panel reflex to direct LDL Fasting Routine  10/25/2018 10/25/2017    **Glucose FUTURE 2mo Routine 12/24/2017 2/22/2018 10/25/2017    **A1C FUTURE 3mo Routine 1/23/2018 2/22/2018 10/25/2017            Who to contact     If you have questions or need follow up information about today's clinic visit or your schedule please contact [facility] directly at [phone].  Normal or non-critical lab and imaging results will be communicated to you by MyChart, letter or phone within 4 business days after the clinic has received the results. If you do not hear from us within 7 days,  "please contact the clinic through [employer] or phone. If you have a critical or abnormal lab result, we will notify you by phone as soon as possible.  Submit refill requests through [employer] or call your pharmacy and they will forward the refill request to us. Please allow 3 business days for your refill to be completed.          Additional Information About Your Visit        [employer] Information     [employer] lets you send messages to your doctor, view your test results, renew your prescriptions, schedule appointments and more. To sign up, go to [email or web address] . Click on \"Log in\" on the left side of the screen, which will take you to the Welcome page. Then click on \"Sign up Now\" on the right side of the page.     You will be asked to enter the access code listed below, as well as some personal information. Please follow the directions to create your username and password.     Your access code is: [email or web address]  Expires: 2017  4:50 PM     Your access code will  in 90 days. If you need help or a new code, please call your [place] clinic or [phone].        Care EveryWhere ID     This is your Care EveryWhere ID. This could be used by other organizations to access your [place] medical records  [id number]        Your Vitals Were     Pulse Temperature Respirations Pulse Oximetry BMI (Body Mass Index)       77 97.3  F (36.3  C) (Tympanic) 16 99% 32.72 kg/m2        Blood Pressure from Last 3 Encounters:   10/25/17 136/80   10/02/17 132/82   17 132/74    Weight from Last 3 Encounters:   10/25/17 235 lb 4 oz (106.7 kg)   17 233 lb 8 oz (105.9 kg)   17 226 lb (102.5 kg)              We Performed the Following     ADMIN MEDICARE: Pneumococcal Vaccine ()     Pneumococcal vaccine 23 valent PPSV23  (Pneumovax) [58332]        Primary Care Provider Office Phone # Fax #    Thomas Bosch [phone] [address] [place] [address]        Equal Access to " Services     [facility]: Hadii aad ku hadlewisjavid Vianneymeka, waaxda luqadaha, qaybta kaalmada barry, adam quintanilla . So [facility] [phone].    ATENCIÓN: Si habla krish, tiene a meyer disposición servicios gratuitos de asistencia lingüística. Llame al [phone].    We comply with applicable federal civil rights laws and [place] laws. We do not discriminate on the basis of race, color, national origin, age, disability, sex, sexual orientation, or gender identity.            Thank you!     Thank you for choosing [facility]  for your care. Our goal is always to provide you with excellent care. Hearing back from our patients is one way we can continue to improve our services. Please take a few minutes to complete the written survey that you may receive in the mail after your visit with us. Thank you!             Your Updated Medication List - Protect others around you: Learn how to safely use, store and throw away your medicines at www.disposemymeds.org.          This list is accurate as of: 10/25/17  6:08 PM.  Always use your most recent med list.                   Brand Name Dispense Instructions for use Diagnosis    ACCU-CHEK WENDY Kit     1 kit    TO TEST 5 TIMES A DAY, BEFORE GIVING BOTH INSULIN    Diabetes mellitus, type 2 (H)       ACE/ARB/ARNI NOT PRESCRIBED (INTENTIONAL)      1 each daily ACE & ARB not prescribed due to Intolerance    Type 2 diabetes mellitus without complication, without long-term current use of insulin (H)       amLODIPine 10 MG tablet    NORVASC    90 tablet    Take 1 tablet (10 mg) by mouth daily    Hypertension goal BP (blood pressure) < 140/90       aspirin 81 MG tablet     30 tablet    Take 1 tablet (81 mg) by mouth daily    Type 2 diabetes mellitus without complication, without long-term current use of insulin (H)       blood glucose calibration solution    no brand specified    1 Bottle    1 drop by Test Solution route every 30 days. WENDY     Type 2 diabetes, HbA1c goal < 7% (H)       blood glucose monitoring test strip    ACCU-CHEK WENDY    3 Box    Use to test 4 times daily before meals and bedtime.    Type 2 diabetes mellitus without complication (H)       glyBURIDE-metFORMIN 5-500 MG per tablet    GLUCOVANCE    120 tablet    Take 2 tablets by mouth 2 times daily (with meals)    Type 2 diabetes mellitus with other diabetic kidney complication       ipratropium - albuterol 0.5 mg/2.5 mg/3 mL 0.5-2.5 (3) MG/3ML neb solution    DUONEB    1 Box    Take 1 vial (3 mLs) by nebulization every 4 hours as needed    Obstructive chronic bronchitis with exacerbation (H)       lovastatin 40 MG tablet    MEVACOR    135 tablet    TAKE ONE & ONE-HALF TABLETS BY MOUTH ONCE DAILY    Hyperlipidemia LDL goal <100, Essential hypertension with goal blood pressure less than 140/90       metoprolol 100 MG tablet    LOPRESSOR    180 tablet    Take 1 tablet (100 mg) by mouth 2 times daily    Hyperlipidemia LDL goal <100, Type 2 diabetes mellitus with other diabetic kidney complication, Essential hypertension with goal blood pressure less than 140/90       order for DME     1 Device    Neb machine for home.    Wheezing, Viral bronchitis       tadalafil 10 MG tablet    CIALIS    5 tablet    Take 0.5-1 tablets (5-10 mg) by mouth daily as needed for erectile dysfunction Never use with nitroglycerin, terazosin or doxazosin.    Erectile dysfunction

## 2017-10-25 NOTE — NURSING NOTE
"Chief Complaint   Patient presents with     Diabetes     follow up     Hyperlipidemia     Hypertension       Initial /68 (BP Location: Left arm, Patient Position: Chair, Cuff Size: Adult Large)  Pulse 77  Temp 97.3  F (36.3  C) (Tympanic)  Resp 16  Wt 235 lb 4 oz (106.7 kg)  SpO2 99%  BMI 32.72 kg/m2 Estimated body mass index is 32.72 kg/(m^2) as calculated from the following:    Height as of 4/6/17: 5' 11.1\" (1.806 m).    Weight as of this encounter: 235 lb 4 oz (106.7 kg).  Medication Reconciliation: complete    Health Maintenance Due   Topic Date Due     EYE EXAM Q1 YEAR  01/30/2013     LIPID MONITORING Q1 YEAR  10/19/2017     PNEUMOCOCCAL (2 of 2 - PPSV23) 10/19/2017     Madelyn Costa, [facility]      "

## 2017-10-31 DIAGNOSIS — E78.5 HYPERLIPIDEMIA LDL GOAL <100: ICD-10-CM

## 2017-10-31 DIAGNOSIS — R73.09 ELEVATED GLUCOSE: ICD-10-CM

## 2017-10-31 DIAGNOSIS — R73.09 ELEVATED HEMOGLOBIN A1C: ICD-10-CM

## 2017-10-31 LAB
CHOLEST SERPL-MCNC: 161 MG/DL
GLUCOSE SERPL-MCNC: 134 MG/DL (ref 70–99)
HBA1C MFR BLD: 9.4 % (ref 4.3–6)
HDLC SERPL-MCNC: 42 MG/DL
LDLC SERPL CALC-MCNC: 85 MG/DL
NONHDLC SERPL-MCNC: 119 MG/DL
TRIGL SERPL-MCNC: 170 MG/DL

## 2017-10-31 PROCEDURE — 36415 COLL VENOUS BLD VENIPUNCTURE: CPT | Performed by: FAMILY MEDICINE

## 2017-10-31 PROCEDURE — 83036 HEMOGLOBIN GLYCOSYLATED A1C: CPT | Mod: QW | Performed by: FAMILY MEDICINE

## 2017-10-31 PROCEDURE — 80061 LIPID PANEL: CPT | Performed by: FAMILY MEDICINE

## 2017-10-31 PROCEDURE — 82947 ASSAY GLUCOSE BLOOD QUANT: CPT | Performed by: FAMILY MEDICINE

## 2017-11-06 ENCOUNTER — TELEPHONE (OUTPATIENT)
Dept: FAMILY MEDICINE | Facility: CLINIC | Age: 67
End: 2017-11-06

## 2017-11-06 DIAGNOSIS — E11.29 TYPE 2 DIABETES MELLITUS WITH OTHER KIDNEY COMPLICATION, UNSPECIFIED LONG TERM INSULIN USE STATUS: Primary | ICD-10-CM

## 2017-11-06 NOTE — TELEPHONE ENCOUNTER
----- Message from Thomas Bosch MD sent at 11/3/2017 12:03 PM CDT -----  Please reach out to Diabetic Ed and see if they will call this patient to discuss how we can get better control

## 2017-11-21 NOTE — TELEPHONE ENCOUNTER
Sukh Alvarado -    The Cinthia Phan (APRN, CNM) that you routed this message to is a Certified Nurse Midwife and does not take care of male patients (or patients with diabetes), so I believe you may have listed the wrong recipient.      Sorry we aren't able to help!    Nakia Mae, DNP, APRN, CNM

## 2017-11-22 ENCOUNTER — TELEPHONE (OUTPATIENT)
Dept: EDUCATION SERVICES | Facility: CLINIC | Age: 67
End: 2017-11-22

## 2017-11-22 NOTE — TELEPHONE ENCOUNTER
I was able to touch base with Maria Esther Vargas from Diabetic ed and she will contact the patient.  Christiano Cardenas MA

## 2017-11-22 NOTE — TELEPHONE ENCOUNTER
Called patient to discuss scheduling diabetes education appointment. Left message with my call back number.     Maria Esther Vargas RDN, PRABHU, CDE

## 2017-11-24 NOTE — TELEPHONE ENCOUNTER
Diabetes Education Scheduling Outreach #2:    Call to patient to schedule. Left message with phone number to call to schedule.    Letter sent to patient requesting to call to schedule.    Jorge Gonzalez OnCall  Diabetes and Nutrition Scheduling

## 2017-11-28 DIAGNOSIS — E78.5 HYPERLIPIDEMIA LDL GOAL <100: ICD-10-CM

## 2017-11-28 DIAGNOSIS — I10 ESSENTIAL HYPERTENSION WITH GOAL BLOOD PRESSURE LESS THAN 140/90: ICD-10-CM

## 2017-11-29 RX ORDER — METOPROLOL TARTRATE 100 MG
TABLET ORAL
Qty: 180 TABLET | Refills: 2 | Status: SHIPPED | OUTPATIENT
Start: 2017-11-29 | End: 2018-06-14

## 2018-04-16 DIAGNOSIS — E11.29 TYPE 2 DIABETES MELLITUS WITH RENAL COMPLICATION (H): Primary | ICD-10-CM

## 2018-04-16 DIAGNOSIS — E11.9 TYPE 2 DIABETES MELLITUS WITHOUT COMPLICATION, WITHOUT LONG-TERM CURRENT USE OF INSULIN (H): ICD-10-CM

## 2018-04-16 NOTE — TELEPHONE ENCOUNTER
"Requested Prescriptions   Pending Prescriptions Disp Refills     glyBURIDE-metFORMIN (GLUCOVANCE) 5-500 MG per tablet 180 tablet 3     Sig: Take 2 tablets by mouth 2 times daily (with meals)    Combination Oral Antihyperglycemic Agents Failed    4/16/2018 11:03 AM       Failed - Patient has documented A1c within the specified period of time.    Recent Labs   Lab Test  10/31/17   0843   A1C  9.4*            Passed - Blood pressure under 140/90 in past 12 months    BP Readings from Last 3 Encounters:   10/25/17 136/80   10/02/17 132/82   09/27/17 132/74                Passed - Patient has a documented LDL level within past 12 mos.    Recent Labs   Lab Test  10/31/17   0843   LDL  85            Passed - Patient has a documented Microalbumin level within past 12 mos.    Recent Labs   Lab Test  09/27/17   0925   MICROL  80   UMALCR  24.97*            Passed - Patient's CR is NOT>1.4 OR Patient's EGFR is NOT<45 within past 12 mos.    Recent Labs   Lab Test  09/21/17   1514   GFRESTIMATED  69   GFRESTBLACK  83       Recent Labs   Lab Test  09/21/17   1514   CR  1.07            Passed - Patient does not have a diagnosis of CHF.       Passed - Patient is 18 years old or older.       Passed - Recent (6 mo) or future (30 days) visit within the authorizing provider's specialty    Patient had office visit in the last 6 months or has a visit in the next 30 days with authorizing provider or within the authorizing provider's specialty.  See \"Patient Info\" tab in inbasket, or \"Choose Columns\" in Meds & Orders section of the refill encounter.              Last Written Prescription Date:  9-27-17  Last Fill Quantity: 120,  # refills: 3   Last Office Visit with [facility] or [facility] prescribing provider:  10/25/17   Future Office Visit:         "

## 2018-04-17 NOTE — TELEPHONE ENCOUNTER
Routing refill request to provider for review/approval because:  Labs out of range:    Component      Latest Ref Rng & Units 7/1/2014 9/23/2015 10/19/2016 9/27/2017   Albumin Urine mg/g Cr      0 - 17 mg/g Cr Unable to calculate 3.60 7.36 24.97 (H)   Due for Hgb A1C. Future order placed.   TIM Cheng

## 2018-04-19 RX ORDER — GLYBURIDE-METFORMIN HYDROCHLORIDE 5; 500 MG/1; MG/1
2 TABLET ORAL 2 TIMES DAILY WITH MEALS
Qty: 120 TABLET | Refills: 0 | Status: SHIPPED | OUTPATIENT
Start: 2018-04-19 | End: 2018-05-17

## 2018-04-19 NOTE — TELEPHONE ENCOUNTER
The pt will not be allowed any more refills without a clinic appointment please inform them.  Thank you.  Have him get in for his HGBA1C prior to that visit also it has been ordered

## 2018-04-19 NOTE — TELEPHONE ENCOUNTER
Pt called back, is scheduled with Danitza, and will call back and make lab appt when it get's closer.

## 2018-05-14 ENCOUNTER — TELEPHONE (OUTPATIENT)
Dept: FAMILY MEDICINE | Facility: CLINIC | Age: 68
End: 2018-05-14

## 2018-05-14 DIAGNOSIS — E11.9 TYPE 2 DIABETES MELLITUS WITHOUT COMPLICATION, WITHOUT LONG-TERM CURRENT USE OF INSULIN (H): ICD-10-CM

## 2018-05-14 LAB — HBA1C MFR BLD: 6.6 % (ref 0–5.6)

## 2018-05-14 PROCEDURE — 36415 COLL VENOUS BLD VENIPUNCTURE: CPT | Performed by: FAMILY MEDICINE

## 2018-05-14 PROCEDURE — 83036 HEMOGLOBIN GLYCOSYLATED A1C: CPT | Mod: QW | Performed by: FAMILY MEDICINE

## 2018-05-14 NOTE — TELEPHONE ENCOUNTER
----- Message from Thomas Bosch MD sent at 5/14/2018  9:37 AM CDT -----  Please call the patient with his excellent diabetes results , keep up the good work

## 2018-05-17 ENCOUNTER — OFFICE VISIT (OUTPATIENT)
Dept: FAMILY MEDICINE | Facility: CLINIC | Age: 68
End: 2018-05-17
Payer: COMMERCIAL

## 2018-05-17 VITALS
BODY MASS INDEX: 33.46 KG/M2 | WEIGHT: 239 LBS | DIASTOLIC BLOOD PRESSURE: 78 MMHG | HEART RATE: 78 BPM | SYSTOLIC BLOOD PRESSURE: 140 MMHG | RESPIRATION RATE: 14 BRPM | TEMPERATURE: 96.4 F | OXYGEN SATURATION: 97 % | HEIGHT: 71 IN

## 2018-05-17 DIAGNOSIS — E11.9 TYPE 2 DIABETES MELLITUS WITHOUT COMPLICATION, WITHOUT LONG-TERM CURRENT USE OF INSULIN (H): ICD-10-CM

## 2018-05-17 DIAGNOSIS — I10 HYPERTENSION GOAL BP (BLOOD PRESSURE) < 140/90: Primary | ICD-10-CM

## 2018-05-17 PROCEDURE — 99214 OFFICE O/P EST MOD 30 MIN: CPT | Performed by: FAMILY MEDICINE

## 2018-05-17 RX ORDER — GLYBURIDE-METFORMIN HYDROCHLORIDE 5; 500 MG/1; MG/1
2 TABLET ORAL 2 TIMES DAILY WITH MEALS
Qty: 180 TABLET | Refills: 3 | Status: SHIPPED | OUTPATIENT
Start: 2018-05-17 | End: 2018-08-14 | Stop reason: DRUGHIGH

## 2018-05-17 RX ORDER — LISINOPRIL 5 MG/1
5 TABLET ORAL DAILY
Qty: 30 TABLET | Refills: 1 | Status: SHIPPED | OUTPATIENT
Start: 2018-05-17 | End: 2018-08-14

## 2018-05-17 ASSESSMENT — PAIN SCALES - GENERAL: PAINLEVEL: NO PAIN (0)

## 2018-05-17 NOTE — MR AVS SNAPSHOT
After Visit Summary   5/17/2018    Mj Ruth    MRN: [id number]           Patient Information     Date Of Birth          1950        Visit Information        Provider Department      5/17/2018 10:20 AM Thomas Bosch MD [facility]        Today's Diagnoses     Hypertension goal BP (blood pressure) < 140/90    -  1    Type 2 diabetes mellitus without complication, without long-term current use of insulin (H)           Follow-ups after your visit        Additional Services     OPHTHALMOLOGY ADULT REFERRAL       Your provider has referred you to: N: [facility] [phone]  [email or web address]    Please be aware that coverage of these services is subject to the terms and limitations of your health insurance plan.  Call member services at your health plan with any benefit or coverage questions.      Please bring the following with you to your appointment:    (1) Any X-Rays, CTs or MRIs which have been performed.  Contact the facility where they were done to arrange for  prior to your scheduled appointment.    (2) List of current medications  (3) This referral request   (4) Any documents/labs given to you for this referral                  Your next 10 appointments already scheduled     Jun 14, 2018  9:40 AM CDT   SHORT with Thomas Bosch MD   [facility]    [address] [place] [id number] [phone]              Who to contact     If you have questions or need follow up information about today's clinic visit or your schedule please contact [facility] directly at [phone].  Normal or non-critical lab and imaging results will be communicated to you by MyChart, letter or phone within 4 business days after the clinic has received the results. If you do not hear from us within 7 days, please contact the clinic through MyChart or phone. If you  "have a critical or abnormal lab result, we will notify you by phone as soon as possible.  Submit refill requests through [employer] or call your pharmacy and they will forward the refill request to us. Please allow 3 business days for your refill to be completed.          Additional Information About Your Visit        [employer] Cover[employer] Information     Snipshot lets you send messages to your doctor, view your test results, renew your prescriptions, schedule appointments and more. To sign up, go to [email or web address] . Click on \"Log in\" on the left side of the screen, which will take you to the Welcome page. Then click on \"Sign up Now\" on the right side of the page.     You will be asked to enter the access code listed below, as well as some personal information. Please follow the directions to create your username and password.     Your access code is: [email or web address]  Expires: 8/15/2018 11:31 AM     Your access code will  in 90 days. If you need help or a new code, please call your [place] clinic or [phone].        Care EveryWhere ID     This is your Care EveryWhere ID. This could be used by other organizations to access your [place] medical records  [id number]        Your Vitals Were     Pulse Temperature Respirations Height Pulse Oximetry BMI (Body Mass Index)    78 96.4  F (35.8  C) (Temporal) 14 5' 11\" (1.803 m) 97% 33.33 kg/m2       Blood Pressure from Last 3 Encounters:   18 140/78   10/25/17 136/80   10/02/17 132/82    Weight from Last 3 Encounters:   18 239 lb (108.4 kg)   10/25/17 235 lb 4 oz (106.7 kg)   17 233 lb 8 oz (105.9 kg)              We Performed the Following     DEPRESSION ACTION PLAN (DAP)     OPHTHALMOLOGY ADULT REFERRAL          Today's Medication Changes          These changes are accurate as of 18 11:31 AM.  If you have any questions, ask your nurse or doctor.               Start taking these medicines.        Dose/Directions    lisinopril 5 MG tablet   Commonly " known as:  PRINIVIL/ZESTRIL   Used for:  Hypertension goal BP (blood pressure) < 140/90   Started by:  Thomas Bosch MD        Dose:  5 mg   Take 1 tablet (5 mg) by mouth daily   Quantity:  30 tablet   Refills:  1            Where to get your medicines      These medications were sent to [facility] [address] [place] [address] - [address]  [address], [facility] [address]     Phone:  [phone]     glyBURIDE-metFORMIN 5-500 MG per tablet    lisinopril 5 MG tablet                Primary Care Provider Office Phone # Fax #    Thomas Bosch [phone] [address] [place] [address]        Equal Access to Services     [facility]: Hadii kristen toth hadasho Soomaali, waaxda luqadaha, qaybta kaalmada adeegyada, adam quintanilla . So [facility] [phone].    ATENCIÓN: Si habla español, tiene a meyer disposición servicios gratuitos de asistencia lingüística. Ll[facility] [phone].    We comply with applicable federal civil rights laws and [place] laws. We do not discriminate on the basis of race, color, national origin, age, disability, sex, sexual orientation, or gender identity.            Thank you!     Thank you for choosing [facility]  for your care. Our goal is always to provide you with excellent care. Hearing back from our patients is one way we can continue to improve our services. Please take a few minutes to complete the written survey that you may receive in the mail after your visit with us. Thank you!             Your Updated Medication List - Protect others around you: Learn how to safely use, store and throw away your medicines at www.disposemymeds.org.          This list is accurate as of 5/17/18 11:31 AM.  Always use your most recent med list.                   Brand Name Dispense Instructions for use Diagnosis    ACCU-CHEK WENDY Kit     1 kit    TO TEST 5 TIMES A DAY, BEFORE GIVING BOTH INSULIN    Diabetes mellitus, type 2 (H)        ACE/ARB/ARNI NOT PRESCRIBED (INTENTIONAL)      1 each daily ACE & ARB not prescribed due to Intolerance    Type 2 diabetes mellitus without complication, without long-term current use of insulin (H)       amLODIPine 10 MG tablet    NORVASC    90 tablet    Take 1 tablet (10 mg) by mouth daily    Hypertension goal BP (blood pressure) < 140/90       aspirin 81 MG tablet     30 tablet    Take 1 tablet (81 mg) by mouth daily    Type 2 diabetes mellitus without complication, without long-term current use of insulin (H)       blood glucose calibration solution    no brand specified    1 Bottle    1 drop by Test Solution route every 30 days. WENDY    Type 2 diabetes, HbA1c goal < 7% (H)       blood glucose monitoring test strip    ACCU-CHEK WENDY    3 Box    Use to test 4 times daily before meals and bedtime.    Type 2 diabetes mellitus without complication (H)       glyBURIDE-metFORMIN 5-500 MG per tablet    GLUCOVANCE    180 tablet    Take 2 tablets by mouth 2 times daily (with meals)    Type 2 diabetes mellitus without complication, without long-term current use of insulin (H)       ipratropium - albuterol 0.5 mg/2.5 mg/3 mL 0.5-2.5 (3) MG/3ML neb solution    DUONEB    1 Box    Take 1 vial (3 mLs) by nebulization every 4 hours as needed    Obstructive chronic bronchitis with exacerbation (H)       lisinopril 5 MG tablet    PRINIVIL/ZESTRIL    30 tablet    Take 1 tablet (5 mg) by mouth daily    Hypertension goal BP (blood pressure) < 140/90       lovastatin 40 MG tablet    MEVACOR    135 tablet    TAKE ONE & ONE-HALF TABLETS BY MOUTH ONCE DAILY    Hyperlipidemia LDL goal <100, Essential hypertension with goal blood pressure less than 140/90       metoprolol tartrate 100 MG tablet    LOPRESSOR    180 tablet    TAKE ONE TABLET BY MOUTH TWICE DAILY    Hyperlipidemia LDL goal <100, Essential hypertension with goal blood pressure less than 140/90       order for DME     1 Device    Neb machine for home.    Wheezing, Viral  bronchitis       tadalafil 10 MG tablet    CIALIS    5 tablet    Take 0.5-1 tablets (5-10 mg) by mouth daily as needed for erectile dysfunction Never use with nitroglycerin, terazosin or doxazosin.    Erectile dysfunction

## 2018-05-17 NOTE — LETTER
My Depression Action Plan  Name: Mj Ruth   Date of Birth 1950  Date: 5/17/2018    My doctor: Thomas Bosch   My clinic: [address] [address] [phone]          GREEN    ZONE   Good Control    What it looks like:     Things are going generally well. You have normal up s and down s. You may even feel depressed from time to time, but bad moods usually last less than a day.   What you need to do:  1. Continue to care for yourself (see self care plan)  2. Check your depression survival kit and update it as needed  3. Follow your physician s recommendations including any medication.  4. Do not stop taking medication unless you consult with your physician first.           YELLOW         ZONE Getting Worse    What it looks like:     Depression is starting to interfere with your life.     It may be hard to get out of bed; you may be starting to isolate yourself from others.    Symptoms of depression are starting to last most all day and this has happened for several days.     You may have suicidal thoughts but they are not constant.   What you need to do:     1. Call your care team, your response to treatment will improve if you keep your care team informed of your progress. Yellow periods are signs an adjustment may need to be made.     2. Continue your self-care, even if you have to fake it!    3. Talk to someone in your support network    4. Open up your depression survival kit           RED    ZONE Medical Alert - Get Help    What it looks like:     Depression is seriously interfering with your life.     You may experience these or other symptoms: You can t get out of bed most days, can t work or engage in other necessary activities, you have trouble taking care of basic hygiene, or basic responsibilities, thoughts of suicide or death that will not go away, self-injurious behavior.     What you need to do:  1. Call your care team and  request a same-day appointment. If they are not available (weekends or after hours) call your local crisis line, emergency room or 911.            Depression Self Care Plan / Survival Kit    Self-Care for Depression  Here s the deal. Your body and mind are really not as separate as most people think.  What you do and think affects how you feel and how you feel influences what you do and think. This means if you do things that people who feel good do, it will help you feel better.  Sometimes this is all it takes.  There is also a place for medication and therapy depending on how severe your depression is, so be sure to consult with your medical provider and/ or Behavioral Health Consultant if your symptoms are worsening or not improving.     In order to better manage my stress, I will:    Exercise  Get some form of exercise, every day. This will help reduce pain and release endorphins, the  feel good  chemicals in your brain. This is almost as good as taking antidepressants!  This is not the same as joining a gym and then never going! (they count on that by the way ) It can be as simple as just going for a walk or doing some gardening, anything that will get you moving.      Hygiene   Maintain good hygiene (Get out of bed in the morning, Make your bed, Brush your teeth, Take a shower, and Get dressed like you were going to work, even if you are unemployed).  If your clothes don't fit try to get ones that do.    Diet  I will strive to eat foods that are good for me, drink plenty of water, and avoid excessive sugar, caffeine, alcohol, and other mood-altering substances.  Some foods that are helpful in depression are: complex carbohydrates, B vitamins, flaxseed, fish or fish oil, fresh fruits and vegetables.    Psychotherapy  I agree to participate in Individual Therapy (if recommended).    Medication  If prescribed medications, I agree to take them.  Missing doses can result in serious side effects.  I understand that  drinking alcohol, or other illicit drug use, may cause potential side effects.  I will not stop my medication abruptly without first discussing it with my provider.    Staying Connected With Others  I will stay in touch with my friends, family members, and my primary care provider/team.    Use your imagination  Be creative.  We all have a creative side; it doesn t matter if it s oil painting, sand castles, or mud pies! This will also kick up the endorphins.    Witness Beauty  (AKA stop and smell the roses) Take a look outside, even in mid-winter. Notice colors, textures. Watch the squirrels and birds.     Service to others  Be of service to others.  There is always someone else in need.  By helping others we can  get out of ourselves  and remember the really important things.  This also provides opportunities for practicing all the other parts of the program.    Humor  Laugh and be silly!  Adjust your TV habits for less news and crime-drama and more comedy.    Control your stress  Try breathing deep, massage therapy, biofeedback, and meditation. Find time to relax each day.     My support system    Clinic Contact:  Phone number:    Contact 1:  Phone number:    Contact 2:  Phone number:    Confucianism/:  Phone number:    Therapist:  Phone number:    Local crisis center:    Phone number:    Other community support:  Phone number:

## 2018-05-17 NOTE — PROGRESS NOTES
SUBJECTIVE:   Mj Ruth is a 67 year old male who presents to clinic today for the following health issues:    Diabetes Follow-up    Patient is checking blood sugars: once daily.  Results are as follows:         am -     Diabetic concerns: None     Symptoms of hypoglycemia (low blood sugar): none     Paresthesias (numbness or burning in feet) or sores: No     Date of last diabetic eye exam: no    Hyperlipidemia Follow-Up      Rate your low fat/cholesterol diet?: not monitoring fat    Taking statin?  Yes, no muscle aches from statin    Other lipid medications/supplements?:  none    Hypertension Follow-up      Outpatient blood pressures are being checked at home.  Results are 120-130/ 70-79.    Low Salt Diet: not monitoring salt    BP Readings from Last 2 Encounters:   05/17/18 140/78   10/25/17 136/80     Hemoglobin A1C (%)   Date Value   05/14/2018 6.6 (H)   10/31/2017 9.4 (H)     LDL Cholesterol Calculated (mg/dL)   Date Value   10/31/2017 85   10/19/2016 97       Amount of exercise or physical activity: None    Problems taking medications regularly: No    Medication side effects: none    Diet: regular (no restrictions)            Problem list and histories reviewed & adjusted, as indicated.  Additional history: as documented  Patient has no concerns today.  States medications are working well.  I did talk to him about possibly adding another medication for his blood pressure and also for kidney protection as he does have some microalbumin present.  Current Outpatient Prescriptions   Medication Sig Dispense Refill     aspirin 81 MG tablet Take 1 tablet (81 mg) by mouth daily 30 tablet 11     Blood Glucose Calibration (GLUCOSE CONTROL) solution 1 drop by Test Solution route every 30 days. WENDY 1 Bottle 3     blood glucose monitoring (ACCU-CHEK WENDY) test strip Use to test 4 times daily before meals and bedtime. 3 Box 0     Blood Glucose Monitoring Suppl (ACCU-CHEK WENDY) KIT TO TEST 5 TIMES A DAY,  BEFORE GIVING BOTH INSULIN 1 kit 0     glyBURIDE-metFORMIN (GLUCOVANCE) 5-500 MG per tablet Take 2 tablets by mouth 2 times daily (with meals) 180 tablet 3     lisinopril (PRINIVIL/ZESTRIL) 5 MG tablet Take 1 tablet (5 mg) by mouth daily 30 tablet 1     lovastatin (MEVACOR) 40 MG tablet TAKE ONE & ONE-HALF TABLETS BY MOUTH ONCE DAILY 135 tablet 3     metoprolol (LOPRESSOR) 100 MG tablet TAKE ONE TABLET BY MOUTH TWICE DAILY 180 tablet 2     ORDER FOR DME Neb machine for home. 1 Device 0     ACE/ARB NOT PRESCRIBED, INTENTIONAL, 1 each daily ACE & ARB not prescribed due to Intolerance (Patient not taking: Reported on 5/17/2018)       amLODIPine (NORVASC) 10 MG tablet Take 1 tablet (10 mg) by mouth daily (Patient not taking: Reported on 5/17/2018) 90 tablet 3     ipratropium - albuterol 0.5 mg/2.5 mg/3 mL (DUONEB) 0.5-2.5 (3) MG/3ML nebulization Take 1 vial (3 mLs) by nebulization every 4 hours as needed (Patient not taking: Reported on 4/6/2017) 1 Box 4     tadalafil (CIALIS) 10 MG tablet Take 0.5-1 tablets (5-10 mg) by mouth daily as needed for erectile dysfunction Never use with nitroglycerin, terazosin or doxazosin. (Patient not taking: Reported on 5/17/2018) 5 tablet 11     [DISCONTINUED] glyBURIDE-metFORMIN (GLUCOVANCE) 5-500 MG per tablet Take 2 tablets by mouth 2 times daily (with meals) 120 tablet 0     Recent Labs   Lab Test  05/14/18   0843  10/31/17   0843  09/21/17   1514   10/19/16   0905   10/04/15   0556  10/03/15   1545  09/23/15   0929   A1C  6.6*  9.4*  11.0*   < >  6.6*   --    --    --   6.0   LDL   --   85   --    --   97   --    --    --   88   HDL   --   42   --    --   42   --    --    --   37*   TRIG   --   170*   --    --   174*   --    --    --   175*   ALT   --    --   26   --    --    --   13  14   --    CR   --    --   1.07   --   1.21   < >  1.12  1.18  1.05   GFRESTIMATED   --    --   69   --   60*   < >  66  62  71   GFRESTBLACK   --    --   83   --   72   < >  79  75  85   POTASSIUM    "--    --   4.2   --   4.4   < >  3.7  3.7   --    TSH   --    --   1.19   --   2.29   --    --    --    --     < > = values in this interval not displayed.      BP Readings from Last 3 Encounters:   05/17/18 140/78   10/25/17 136/80   10/02/17 132/82    Wt Readings from Last 3 Encounters:   05/17/18 239 lb (108.4 kg)   10/25/17 235 lb 4 oz (106.7 kg)   09/27/17 233 lb 8 oz (105.9 kg)                    Reviewed and updated as needed this visit by clinical staff  Tobacco  Allergies  Meds  Soc Hx      Reviewed and updated as needed this visit by Provider         ROS:  Constitutional, HEENT, cardiovascular, pulmonary, gi and gu systems are negative, except as otherwise noted.    OBJECTIVE:     /78 (BP Location: Left arm, Patient Position: Chair, Cuff Size: Adult Large)  Pulse 78  Temp 96.4  F (35.8  C) (Temporal)  Resp 14  Ht 5' 11\" (1.803 m)  Wt 239 lb (108.4 kg)  SpO2 97%  BMI 33.33 kg/m2  Body mass index is 33.33 kg/(m^2).   GENERAL: healthy, alert and no distress  NECK: no adenopathy, no asymmetry, masses, or scars and thyroid normal to palpation  RESP: lungs clear to auscultation - no rales, rhonchi or wheezes  CV: regular rate and rhythm, normal S1 S2, no S3 or S4, no murmur, click or rub, no peripheral edema and peripheral pulses strong  MS: no gross musculoskeletal defects noted, no edema  Diabetic foot exam: normal sensory exam    Diagnostic Test Results:  No results found for this or any previous visit (from the past 24 hour(s)).    ASSESSMENT:       PLAN:   1. Hypertension goal BP (blood pressure) < 140/90  Stable but could use some improvement.  We will start lisinopril 5 mg daily for assistance in blood pressure control and secondary to the fact he has some microalbuminuria.  Did tell him about the side effects or potential side effects of the medication I will see him back in 3-4 weeks time for recheck we will repeat a creatinine at this time.  - lisinopril (PRINIVIL/ZESTRIL) 5 MG tablet; " Take 1 tablet (5 mg) by mouth daily  Dispense: 30 tablet; Refill: 1    2. Type 2 diabetes mellitus without complication, without long-term current use of insulin (H)  Patient has had marked provement in his hemoglobin A1c from 9.4 to now 6.6.  Continue on his regimen as directed.  He does need ophthalmology referral for eye exam will make that form.  - glyBURIDE-metFORMIN (GLUCOVANCE) 5-500 MG per tablet; Take 2 tablets by mouth 2 times daily (with meals)  Dispense: 180 tablet; Refill: 3  - OPHTHALMOLOGY ADULT REFERRAL          Thomas Bosch MD  [facility]

## 2018-05-18 ASSESSMENT — PATIENT HEALTH QUESTIONNAIRE - PHQ9: SUM OF ALL RESPONSES TO PHQ QUESTIONS 1-9: 9

## 2018-06-12 DIAGNOSIS — E78.5 HYPERLIPIDEMIA LDL GOAL <100: ICD-10-CM

## 2018-06-12 DIAGNOSIS — I10 ESSENTIAL HYPERTENSION WITH GOAL BLOOD PRESSURE LESS THAN 140/90: ICD-10-CM

## 2018-06-12 DIAGNOSIS — I10 HYPERTENSION GOAL BP (BLOOD PRESSURE) < 140/90: ICD-10-CM

## 2018-06-12 RX ORDER — LOVASTATIN 40 MG
TABLET ORAL
Qty: 135 TABLET | Refills: 1 | Status: SHIPPED | OUTPATIENT
Start: 2018-06-12 | End: 2018-11-20

## 2018-06-12 RX ORDER — AMLODIPINE BESYLATE 10 MG/1
TABLET ORAL
Qty: 90 TABLET | Refills: 3 | Status: SHIPPED | OUTPATIENT
Start: 2018-06-12 | End: 2019-04-09

## 2018-06-12 NOTE — TELEPHONE ENCOUNTER
Prescription approved per [facility] Refill Protocol.......................TIM Cheng

## 2018-06-12 NOTE — TELEPHONE ENCOUNTER
"Requested Prescriptions   Pending Prescriptions Disp Refills     amLODIPine (NORVASC) 10 MG tablet [Pharmacy Med Name: AMLODIPINE 10MG TAB] 90 tablet 3     Sig: TAKE ONE TABLET BY MOUTH ONCE DAILY    Calcium Channel Blockers Protocol  Failed    6/12/2018 11:35 AM       Failed - Blood pressure under 140/90 in past 12 months    BP Readings from Last 3 Encounters:   05/17/18 140/78   10/25/17 136/80   10/02/17 132/82                Passed - Recent (12 mo) or future (30 days) visit within the authorizing provider's specialty    Patient had office visit in the last 12 months or has a visit in the next 30 days with authorizing provider or within the authorizing provider's specialty.  See \"Patient Info\" tab in inbasket, or \"Choose Columns\" in Meds & Orders section of the refill encounter.           Passed - Patient is age 18 or older       Passed - Normal serum creatinine on file in past 12 months    Recent Labs   Lab Test  09/21/17   1514   CR  1.07             lovastatin (MEVACOR) 40 MG tablet [Pharmacy Med Name: LOVASTATIN 40MG     TAB] 135 tablet 3     Sig: TAKE ONE & ONE-HALF TABLETS BY MOUTH ONCE DAILY    Statins Protocol Passed    6/12/2018 11:35 AM       Passed - LDL on file in past 12 months    Recent Labs   Lab Test  10/31/17   0843   LDL  85            Passed - No abnormal creatine kinase in past 12 months    No lab results found.            Passed - Recent (12 mo) or future (30 days) visit within the authorizing provider's specialty    Patient had office visit in the last 12 months or has a visit in the next 30 days with authorizing provider or within the authorizing provider's specialty.  See \"Patient Info\" tab in inbasket, or \"Choose Columns\" in Meds & Orders section of the refill encounter.           Passed - Patient is age 18 or older          Last Written Prescription Date:  6-1-17  Last Fill Quantity: 90,  # refills: 3   Last Office Visit with G, P or [facility] prescribing provider:  5/17/18   Future " Office Visit:    Next 5 appointments (look out 90 days)     Jun 14, 2018  9:40 AM CDT   SHORT with Thomas Bosch MD   [facility]    [address] [place] [id number] [phone]                 Lovastatin 40 MG       Last Written Prescription Date:  4/6/17  Last Fill Quantity: 135,   # refills: 3  Last Office Visit: 5/17/18  Future Office visit:

## 2018-06-14 ENCOUNTER — OFFICE VISIT (OUTPATIENT)
Dept: FAMILY MEDICINE | Facility: CLINIC | Age: 68
End: 2018-06-14
Payer: COMMERCIAL

## 2018-06-14 VITALS
SYSTOLIC BLOOD PRESSURE: 140 MMHG | HEART RATE: 75 BPM | WEIGHT: 237.2 LBS | DIASTOLIC BLOOD PRESSURE: 80 MMHG | OXYGEN SATURATION: 96 % | BODY MASS INDEX: 33.08 KG/M2 | TEMPERATURE: 97.9 F

## 2018-06-14 DIAGNOSIS — I10 HYPERTENSION GOAL BP (BLOOD PRESSURE) < 140/90: ICD-10-CM

## 2018-06-14 DIAGNOSIS — I10 ESSENTIAL HYPERTENSION WITH GOAL BLOOD PRESSURE LESS THAN 140/90: ICD-10-CM

## 2018-06-14 DIAGNOSIS — J44.9 CHRONIC OBSTRUCTIVE PULMONARY DISEASE, UNSPECIFIED COPD TYPE (H): ICD-10-CM

## 2018-06-14 DIAGNOSIS — E78.5 HYPERLIPIDEMIA LDL GOAL <100: ICD-10-CM

## 2018-06-14 DIAGNOSIS — E11.29 TYPE 2 DIABETES MELLITUS WITH OTHER KIDNEY COMPLICATION, UNSPECIFIED LONG TERM INSULIN USE STATUS: Primary | ICD-10-CM

## 2018-06-14 PROBLEM — F32.0 MAJOR DEPRESSIVE DISORDER, SINGLE EPISODE, MILD (H): Status: RESOLVED | Noted: 2017-06-01 | Resolved: 2018-06-14

## 2018-06-14 PROCEDURE — 99214 OFFICE O/P EST MOD 30 MIN: CPT | Performed by: FAMILY MEDICINE

## 2018-06-14 RX ORDER — METOPROLOL TARTRATE 100 MG
100 TABLET ORAL 2 TIMES DAILY
Qty: 180 TABLET | Refills: 3 | Status: SHIPPED | OUTPATIENT
Start: 2018-06-14 | End: 2019-04-09

## 2018-06-14 RX ORDER — LISINOPRIL 10 MG/1
10 TABLET ORAL DAILY
Qty: 90 TABLET | Refills: 3 | Status: SHIPPED | OUTPATIENT
Start: 2018-06-14 | End: 2019-04-09

## 2018-06-14 ASSESSMENT — PAIN SCALES - GENERAL: PAINLEVEL: NO PAIN (0)

## 2018-06-14 NOTE — MR AVS SNAPSHOT
"              After Visit Summary   6/14/2018    Mj Ruth    MRN: [id number]           Patient Information     Date Of Birth          1950        Visit Information        Provider Department      6/14/2018 9:40 AM Thomas Bosch MD [facility]        Today's Diagnoses     Type 2 diabetes mellitus with other kidney complication, unspecified long term insulin use status (H)    -  1    Chronic obstructive pulmonary disease, unspecified COPD type (H)        Essential hypertension with goal blood pressure less than 140/90        Hypertension goal BP (blood pressure) < 140/90        Hyperlipidemia LDL goal <100           Follow-ups after your visit        Your next 10 appointments already scheduled     Aug 14, 2018  9:20 AM CDT   SHORT with Thomas Bosch MD   [facility]    [address] [place] [address] [phone]              Who to contact     If you have questions or need follow up information about today's clinic visit or your schedule please contact [facility] directly at [phone].  Normal or non-critical lab and imaging results will be communicated to you by [facility], letter or phone within 4 business days after the clinic has received the results. If you do not hear from us within 7 days, please contact the clinic through [facility] or phone. If you have a critical or abnormal lab result, we will notify you by phone as soon as possible.  Submit refill requests through [facility] or call your pharmacy and they will forward the refill request to us. Please allow 3 business days for your refill to be completed.          Additional Information About Your Visit        [facility] Information     [facility] lets you send messages to your doctor, view your test results, renew your prescriptions, schedule appointments and more. To sign up, go to [email or web address] . Click on \"Log in\" on the left side of the screen, " "which will take you to the Welcome page. Then click on \"Sign up Now\" on the right side of the page.     You will be asked to enter the access code listed below, as well as some personal information. Please follow the directions to create your username and password.     Your access code is: [email or web address]  Expires: 8/15/2018 11:31 AM     Your access code will  in 90 days. If you need help or a new code, please call your [place] clinic or [phone].        Care EveryWhere ID     This is your Care EveryWhere ID. This could be used by other organizations to access your [place] medical records  [id number]        Your Vitals Were     Pulse Temperature Pulse Oximetry BMI (Body Mass Index)          75 97.9  F (36.6  C) (Temporal) 96% 33.08 kg/m2         Blood Pressure from Last 3 Encounters:   18 140/80   18 140/78   10/25/17 136/80    Weight from Last 3 Encounters:   18 237 lb 3.2 oz (107.6 kg)   18 239 lb (108.4 kg)   10/25/17 235 lb 4 oz (106.7 kg)              We Performed the Following     Creatinine          Today's Medication Changes          These changes are accurate as of 18 10:23 AM.  If you have any questions, ask your nurse or doctor.               These medicines have changed or have updated prescriptions.        Dose/Directions    * lisinopril 5 MG tablet   Commonly known as:  PRINIVIL/ZESTRIL   This may have changed:  Another medication with the same name was added. Make sure you understand how and when to take each.   Used for:  Hypertension goal BP (blood pressure) < 140/90   Changed by:  Thomas Bosch MD        Dose:  5 mg   Take 1 tablet (5 mg) by mouth daily   Quantity:  30 tablet   Refills:  1       * lisinopril 10 MG tablet   Commonly known as:  PRINIVIL/ZESTRIL   This may have changed:  You were already taking a medication with the same name, and this prescription was added. Make sure you understand how and when to take each.   Used for:  Hypertension goal " BP (blood pressure) < 140/90   Changed by:  Thomas Bosch MD        Dose:  10 mg   Take 1 tablet (10 mg) by mouth daily   Quantity:  90 tablet   Refills:  3       metoprolol tartrate 100 MG tablet   Commonly known as:  LOPRESSOR   This may have changed:  See the new instructions.   Used for:  Hyperlipidemia LDL goal <100, Essential hypertension with goal blood pressure less than 140/90   Changed by:  Thomas Bosch MD        Dose:  100 mg   Take 1 tablet (100 mg) by mouth 2 times daily   Quantity:  180 tablet   Refills:  3       * Notice:  This list has 2 medication(s) that are the same as other medications prescribed for you. Read the directions carefully, and ask your doctor or other care provider to review them with you.         Where to get your medicines      These medications were sent to [facility] [address] [place] [address] [address]  [address] [facility] [address]     Phone:  [phone]     lisinopril 10 MG tablet    metoprolol tartrate 100 MG tablet                Primary Care Provider Office Phone # Fax #    Thomas Bosch [phone]       [address] [facility]   [place] [address]        Equal Access to Services     [facility]: Hadii kristen toth hadasho Someka, waaxda luqadaha, qaybta kaalmada adeegyada, adam quintanilla . So [facility] [phone].    ATENCIÓN: Si habla español, tiene a meyer disposición servicios gratuitos de asistencia lingüística. Llame al [phone].    We comply with applicable federal civil rights laws and [place] laws. We do not discriminate on the basis of race, color, national origin, age, disability, sex, sexual orientation, or gender identity.            Thank you!     Thank you for choosing [facility]  for your care. Our goal is always to provide you with excellent care. Hearing back from our patients is one way we can continue to improve our services. Please take a few minutes to complete the written  survey that you may receive in the mail after your visit with us. Thank you!             Your Updated Medication List - Protect others around you: Learn how to safely use, store and throw away your medicines at www.disposemymeds.org.          This list is accurate as of 6/14/18 10:23 AM.  Always use your most recent med list.                   Brand Name Dispense Instructions for use Diagnosis    ACCU-CHEK WENDY Kit     1 kit    TO TEST 5 TIMES A DAY, BEFORE GIVING BOTH INSULIN    Diabetes mellitus, type 2 (H)       ACE/ARB/ARNI NOT PRESCRIBED (INTENTIONAL)      1 each daily ACE & ARB not prescribed due to Intolerance    Type 2 diabetes mellitus without complication, without long-term current use of insulin (H)       amLODIPine 10 MG tablet    NORVASC    90 tablet    TAKE ONE TABLET BY MOUTH ONCE DAILY    Hypertension goal BP (blood pressure) < 140/90       aspirin 81 MG tablet     30 tablet    Take 1 tablet (81 mg) by mouth daily    Type 2 diabetes mellitus without complication, without long-term current use of insulin (H)       blood glucose calibration solution    no brand specified    1 Bottle    1 drop by Test Solution route every 30 days. WENDY    Type 2 diabetes, HbA1c goal < 7% (H)       blood glucose monitoring test strip    ACCU-CHEK WENDY    3 Box    Use to test 4 times daily before meals and bedtime.    Type 2 diabetes mellitus without complication (H)       glyBURIDE-metFORMIN 5-500 MG per tablet    GLUCOVANCE    180 tablet    Take 2 tablets by mouth 2 times daily (with meals)    Type 2 diabetes mellitus without complication, without long-term current use of insulin (H)       ipratropium - albuterol 0.5 mg/2.5 mg/3 mL 0.5-2.5 (3) MG/3ML neb solution    DUONEB    1 Box    Take 1 vial (3 mLs) by nebulization every 4 hours as needed    Obstructive chronic bronchitis with exacerbation (H)       * lisinopril 5 MG tablet    PRINIVIL/ZESTRIL    30 tablet    Take 1 tablet (5 mg) by mouth daily    Hypertension  goal BP (blood pressure) < 140/90       * lisinopril 10 MG tablet    PRINIVIL/ZESTRIL    90 tablet    Take 1 tablet (10 mg) by mouth daily    Hypertension goal BP (blood pressure) < 140/90       lovastatin 40 MG tablet    MEVACOR    135 tablet    TAKE ONE & ONE-HALF TABLETS BY MOUTH ONCE DAILY    Hyperlipidemia LDL goal <100, Essential hypertension with goal blood pressure less than 140/90       metoprolol tartrate 100 MG tablet    LOPRESSOR    180 tablet    Take 1 tablet (100 mg) by mouth 2 times daily    Hyperlipidemia LDL goal <100, Essential hypertension with goal blood pressure less than 140/90       order for DME     1 Device    Neb machine for home.    Wheezing, Viral bronchitis       tadalafil 10 MG tablet    CIALIS    5 tablet    Take 0.5-1 tablets (5-10 mg) by mouth daily as needed for erectile dysfunction Never use with nitroglycerin, terazosin or doxazosin.    Erectile dysfunction       * Notice:  This list has 2 medication(s) that are the same as other medications prescribed for you. Read the directions carefully, and ask your doctor or other care provider to review them with you.

## 2018-06-14 NOTE — NURSING NOTE
Health Maintenance Due   Topic Date Due     EYE EXAM Q1 YEAR  01/30/2013       Health Maintenance reviewed at today's visit patient asked to schedule/complete:   Patient is aware.

## 2018-06-14 NOTE — PROGRESS NOTES
SUBJECTIVE:                                                      Chief Complaint   Patient presents with     Recheck Medication     lisinopril and glucovance        Diabetes Follow-up    Patient is checking blood sugars: once daily.  Results are as follows:         am - 85-97    Diabetic concerns: None     Symptoms of hypoglycemia (low blood sugar): none     Paresthesias (numbness or burning in feet) or sores: No     Date of last diabetic eye exam: coming up next month    BP Readings from Last 2 Encounters:   06/14/18 140/80   05/17/18 140/78     Hemoglobin A1C (%)   Date Value   05/14/2018 6.6 (H)   10/31/2017 9.4 (H)     LDL Cholesterol Calculated (mg/dL)   Date Value   10/31/2017 85   10/19/2016 97     Hypertension Follow-up      Outpatient blood pressures are being checked at home.  Results are 120/70's.    Low Salt Diet: no added salt      Amount of exercise or physical activity: 2-3 days/week for an average of 15-30 minutes    Problems taking medications regularly: No    Medication side effects: none    Diet: regular (no restrictions)        Mj is a 67 year old     ROS:  Constitutional, HEENT, cardiovascular, pulmonary, gi and gu systems are negative, except as otherwise noted.    OBJECTIVE:                                                    /80 (BP Location: Right arm, Patient Position: Chair, Cuff Size: Adult Regular)  Pulse 75  Temp 97.9  F (36.6  C) (Temporal)  Wt 237 lb 3.2 oz (107.6 kg)  SpO2 96%  BMI 33.08 kg/m2  Body mass index is 33.08 kg/(m^2).    GENERAL: healthy, alert and no distress  NECK: no adenopathy, no asymmetry, masses, or scars and thyroid normal to palpation  RESP: lungs clear to auscultation - no rales, rhonchi or wheezes  CV: regular rate and rhythm, normal S1 S2, no S3 or S4, no murmur, click or rub, no peripheral edema and peripheral pulses strong  MS: no gross musculoskeletal defects noted, no edema    Diagnostic Test Results:  none      ASSESSMENT/PLAN:                                                         Encounter Diagnoses   Name Primary?     Type 2 diabetes mellitus with other kidney complication, unspecified long term insulin use status (H) Yes     Chronic obstructive pulmonary disease, unspecified COPD type (H)      Essential hypertension with goal blood pressure less than 140/90      Hypertension goal BP (blood pressure) < 140/90      Hyperlipidemia LDL goal <100      We will increase patient's lisinopril to 10 mg.  We will draw a creatinine today and recheck his blood pressure and creatinine in the next 5-6 weeks and his blood pressure is in fair control today but I would like to get better control.      Thomas Bosch MD, MD  [facility]

## 2018-08-14 ENCOUNTER — OFFICE VISIT (OUTPATIENT)
Dept: FAMILY MEDICINE | Facility: CLINIC | Age: 68
End: 2018-08-14
Payer: COMMERCIAL

## 2018-08-14 VITALS
OXYGEN SATURATION: 97 % | DIASTOLIC BLOOD PRESSURE: 72 MMHG | BODY MASS INDEX: 33.19 KG/M2 | HEART RATE: 81 BPM | TEMPERATURE: 97.8 F | WEIGHT: 238 LBS | RESPIRATION RATE: 16 BRPM | SYSTOLIC BLOOD PRESSURE: 132 MMHG

## 2018-08-14 DIAGNOSIS — D12.6 BENIGN NEOPLASM OF COLON, UNSPECIFIED PART OF COLON: ICD-10-CM

## 2018-08-14 DIAGNOSIS — J44.9 CHRONIC OBSTRUCTIVE PULMONARY DISEASE, UNSPECIFIED COPD TYPE (H): ICD-10-CM

## 2018-08-14 DIAGNOSIS — J41.0 SIMPLE CHRONIC BRONCHITIS (H): Primary | ICD-10-CM

## 2018-08-14 DIAGNOSIS — E11.9 TYPE 2 DIABETES MELLITUS WITHOUT COMPLICATION, WITHOUT LONG-TERM CURRENT USE OF INSULIN (H): ICD-10-CM

## 2018-08-14 LAB
FEF 25/75: NORMAL
FEV-1: NORMAL
FEV1/FVC: NORMAL
FVC: NORMAL

## 2018-08-14 PROCEDURE — 94010 BREATHING CAPACITY TEST: CPT | Performed by: FAMILY MEDICINE

## 2018-08-14 PROCEDURE — 99214 OFFICE O/P EST MOD 30 MIN: CPT | Mod: 25 | Performed by: FAMILY MEDICINE

## 2018-08-14 RX ORDER — IPRATROPIUM BROMIDE AND ALBUTEROL SULFATE 2.5; .5 MG/3ML; MG/3ML
3 SOLUTION RESPIRATORY (INHALATION) EVERY 4 HOURS PRN
Qty: 1 BOX | Refills: 4 | Status: SHIPPED | OUTPATIENT
Start: 2018-08-14 | End: 2019-04-09

## 2018-08-14 RX ORDER — GLYBURIDE-METFORMIN HYDROCHLORIDE 5; 500 MG/1; MG/1
1 TABLET ORAL 2 TIMES DAILY WITH MEALS
Qty: 180 TABLET | Refills: 3 | Status: SHIPPED | OUTPATIENT
Start: 2018-08-14 | End: 2019-04-09

## 2018-08-14 ASSESSMENT — PAIN SCALES - GENERAL: PAINLEVEL: NO PAIN (0)

## 2018-08-14 NOTE — LETTER
My COPD Action Plan     Name: Mj Ruth    YOB: 1950   Date: 8/14/2018    My doctor: Thomas Bosch MD, MD   My clinic: [address] [address] [phone]  My Controller Medicine: none   Dose:      My Rescue Medicine: Albuterol nebulizer solution    Dose:      My Flare Up Medicine: none   Dose:      My COPD Severity:       Use of Oxygen: Oxygen Not Prescribed      Make sure you've had your pneumonia   vaccines.          GREEN ZONE       Doing well today      Usual level of activity and exercise    Usual amount of cough and mucus    No shortness of breath    Usual level of health (thinking clearly, sleeping well, feel like eating) Actions:      Take daily medicines    Use oxygen as prescribed    Follow regular exercise and diet plan    Avoid cigarette smoke and other irritants that harm the lungs           YELLOW ZONE          Having a bad day or flare up      Short of breath more than usual    A lot more sputum (mucus) than usual    Sputum looks yellow, green, tan, brown or bloody    More coughing or wheezing    Fever or chills    Less energy; trouble completing activities    Trouble thinking or focusing    Using quick relief inhaler or nebulizer more often    Poor sleep; symptoms wake me up    Do not feel like eating Actions:      Get plenty of rest    Take daily medicines    Use quick relief inhaler every 4-6 hours    If you use oxygen, call you doctor to see if you should adjust your oxygen    Do breathing exercises or other things to help you relax    Let a loved one, friend or neighbor know you are feeling worse    Call your care team if you have 2 or more symptoms.  Start taking steroids or antibiotics if directed by your care team           RED ZONE       Need medical care now      Severe shortness of breath (feel you can't breathe)    Fever, chills    Not enough breath to do any activity    Trouble coughing up mucus, walking or  talking    Blood in mucus    Frequent coughing   Rescue medicines are not working    Not able to sleep because of breathing    Feel confused or drowsy    Chest pain    Actions:      Call your health care team.  If you cannot reach your care team, call 911 or go to the emergency room.        Annual Reminders:  Meet with Care Team, Flu Shot every Fall  Pharmacy:    [facility] - [place], [place] - [address]  [facility] - [place], [place] - [address]

## 2018-08-14 NOTE — MR AVS SNAPSHOT
After Visit Summary   8/14/2018    Mj Ruth    MRN: [id number]           Patient Information     Date Of Birth          1950        Visit Information        Provider Department      8/14/2018 9:20 AM Thomas Bosch MD [facility]        Today's Diagnoses     Simple chronic bronchitis (H)    -  1    Type 2 diabetes mellitus without complication, without long-term current use of insulin (H)        Benign neoplasm of colon, unspecified part of colon        Chronic obstructive pulmonary disease, unspecified COPD type (H)           Follow-ups after your visit        Additional Services     GASTROENTEROLOGY ADULT REF PROCEDURE ONLY [facility] [phone]; [facility] Provider       Last Lab Result: Creatinine (mg/dL)       Date                     Value                 09/21/2017               1.07             ----------  Body mass index is 33.19 kg/(m^2).     Needed:  No  Language:  English    Patient will be contacted to schedule procedure.     Please be aware that coverage of these services is subject to the terms and limitations of your health insurance plan.  Call member services at your health plan with any benefit or coverage questions.  Any procedures must be performed at a [facility] facility OR coordinated by your clinic's referral office.    Please bring the following with you to your appointment:    (1) Any X-Rays, CTs or MRIs which have been performed.  Contact the facility where they were done to arrange for  prior to your scheduled appointment.    (2) List of current medications   (3) This referral request   (4) Any documents/labs given to you for this referral            PULMONARY MEDICINE REFERRAL       Your provider has referred you to: [facility]: [facility] [phone]   [email or web address]    Please be aware that coverage of these services is subject to the terms and limitations  of your health insurance plan.  Call member services at your health plan with any benefit or coverage questions.      Please bring the following with you to your appointment:    (1) Any X-Rays, CTs or MRIs which have been performed.  Contact the facility where they were done to arrange for  prior to your scheduled appointment.    (2) List of current medications   (3) This referral request   (4) Any documents/labs given to you for this referral                  Your next 10 appointments already scheduled     Sep 21, 2018  2:00 PM CDT   [facility] Visit with [facility]re[facility]   [facility]    [address] [place] [id number] [phone]              Who to contact     If you have questions or need follow up information about today's clinic visit or your schedule please contact [facility] directly at [phone].  Normal or non-critical lab and imaging results will be communicated to you by MyChart, letter or phone within 4 business days after the clinic has received the results. If you do not hear from us within 7 days, please contact the clinic through MyChart or phone. If you have a critical or abnormal lab result, we will notify you by phone as soon as possible.  Submit refill requests through sougou or call your pharmacy and they will forward the refill request to us. Please allow 3 business days for your refill to be completed.          Additional Information About Your Visit        Care EveryWhere ID     This is your Care EveryWhere ID. This could be used by other organizations to access your [place] medical records  [id number]        Your Vitals Were     Pulse Temperature Respirations Pulse Oximetry BMI (Body Mass Index)       81 97.8  F (36.6  C) (Tympanic) 16 97% 33.19 kg/m2        Blood Pressure from Last 3 Encounters:   08/14/18 132/72   06/14/18 140/80   05/17/18 140/78    Weight from Last 3 Encounters:   08/14/18 238 lb  (108 kg)   06/14/18 237 lb 3.2 oz (107.6 kg)   05/17/18 239 lb (108.4 kg)              We Performed the Following     GASTROENTEROLOGY ADULT REF PROCEDURE ONLY [facility] [phone]; [place] Provider     PULMONARY MEDICINE REFERRAL     Spirometry, Breathing Capacity: Normal Order, Clinic Performed          Today's Medication Changes          These changes are accurate as of 8/14/18  2:47 PM.  If you have any questions, ask your nurse or doctor.               These medicines have changed or have updated prescriptions.        Dose/Directions    glyBURIDE-metFORMIN 5-500 MG per tablet   Commonly known as:  GLUCOVANCE   This may have changed:  how much to take   Used for:  Type 2 diabetes mellitus without complication, without long-term current use of insulin (H)   Changed by:  Thomas Bosch MD        Dose:  1 tablet   Take 1 tablet by mouth 2 times daily (with meals)   Quantity:  180 tablet   Refills:  3            Where to get your medicines      These medications were sent to [facility] [address] [place] [address] [address]  [address] [facility] [address]     Phone:  [phone]     glyBURIDE-metFORMIN 5-500 MG per tablet    ipratropium - albuterol 0.5 mg/2.5 mg/3 mL 0.5-2.5 (3) MG/3ML neb solution                Primary Care Provider Office Phone # Fax #    Thomas Bosch [phone] [address] [facility]   [facility] [address]        Equal Access to Services     [facility]: Hadii kristen ku hadasho Soomaali, waaxda luqadaha, qaybta kaalmada adeegyada, adam lawrence. So [facility] [phone].    ATENCIÓN: Si habla español, tiene a meyer disposición servicios gratuitos de asistencia lingüística. Llame al [phone].    We comply with applicable federal civil rights laws and [place] laws. We do not discriminate on the basis of race, color, national origin, age, disability, sex, sexual orientation, or gender identity.            Thank you!     Thank  you for choosing [facility]  for your care. Our goal is always to provide you with excellent care. Hearing back from our patients is one way we can continue to improve our services. Please take a few minutes to complete the written survey that you may receive in the mail after your visit with us. Thank you!             Your Updated Medication List - Protect others around you: Learn how to safely use, store and throw away your medicines at www.disposemymeds.org.          This list is accurate as of 8/14/18  2:47 PM.  Always use your most recent med list.                   Brand Name Dispense Instructions for use Diagnosis    ACCU-CHEK WENDY Kit     1 kit    TO TEST 5 TIMES A DAY, BEFORE GIVING BOTH INSULIN    Diabetes mellitus, type 2 (H)       ACE/ARB/ARNI NOT PRESCRIBED (INTENTIONAL)      1 each daily ACE & ARB not prescribed due to Intolerance    Type 2 diabetes mellitus without complication, without long-term current use of insulin (H)       amLODIPine 10 MG tablet    NORVASC    90 tablet    TAKE ONE TABLET BY MOUTH ONCE DAILY    Hypertension goal BP (blood pressure) < 140/90       aspirin 81 MG tablet     30 tablet    Take 1 tablet (81 mg) by mouth daily    Type 2 diabetes mellitus without complication, without long-term current use of insulin (H)       blood glucose calibration solution    no brand specified    1 Bottle    1 drop by Test Solution route every 30 days. WENDY    Type 2 diabetes, HbA1c goal < 7% (H)       blood glucose monitoring test strip    ACCU-CHEK WENDY    3 Box    Use to test 4 times daily before meals and bedtime.    Type 2 diabetes mellitus without complication (H)       glyBURIDE-metFORMIN 5-500 MG per tablet    GLUCOVANCE    180 tablet    Take 1 tablet by mouth 2 times daily (with meals)    Type 2 diabetes mellitus without complication, without long-term current use of insulin (H)       ipratropium - albuterol 0.5 mg/2.5 mg/3 mL 0.5-2.5 (3) MG/3ML neb solution    DUONEB     1 Box    Take 1 vial (3 mLs) by nebulization every 4 hours as needed    Simple chronic bronchitis (H)       lisinopril 10 MG tablet    PRINIVIL/ZESTRIL    90 tablet    Take 1 tablet (10 mg) by mouth daily    Hypertension goal BP (blood pressure) < 140/90       lovastatin 40 MG tablet    MEVACOR    135 tablet    TAKE ONE & ONE-HALF TABLETS BY MOUTH ONCE DAILY    Hyperlipidemia LDL goal <100, Essential hypertension with goal blood pressure less than 140/90       metoprolol tartrate 100 MG tablet    LOPRESSOR    180 tablet    Take 1 tablet (100 mg) by mouth 2 times daily    Hyperlipidemia LDL goal <100, Essential hypertension with goal blood pressure less than 140/90       order for DME     1 Device    Neb machine for home.    Wheezing, Viral bronchitis       tadalafil 10 MG tablet    CIALIS    5 tablet    Take 0.5-1 tablets (5-10 mg) by mouth daily as needed for erectile dysfunction Never use with nitroglycerin, terazosin or doxazosin.    Erectile dysfunction

## 2018-08-14 NOTE — PROGRESS NOTES
SUBJECTIVE:   Mj Ruth is a 68 year old male who presents to clinic today for the following health issues:      Diabetes Follow-up    Patient is checking blood sugars: once daily.  Results are as follows:         am -     Diabetic concerns: None     Symptoms of hypoglycemia (low blood sugar): none     Paresthesias (numbness or burning in feet) or sores: No     Date of last diabetic eye exam: he states he had one in June 2048. He will call and have them send us a copy    Diabetes Management Resources    Hyperlipidemia Follow-Up      Rate your low fat/cholesterol diet?: not monitoring fat    Taking statin?  Yes, no muscle aches from statin    Other lipid medications/supplements?:  none    Hypertension Follow-up      Outpatient blood pressures are being checked at home.  Results are systolic 120-130, diastolic 74.    Low Salt Diet: not monitoring salt    BP Readings from Last 2 Encounters:   08/14/18 132/72   06/14/18 140/80     Hemoglobin A1C (%)   Date Value   05/14/2018 6.6 (H)   10/31/2017 9.4 (H)     LDL Cholesterol Calculated (mg/dL)   Date Value   10/31/2017 85   10/19/2016 97       Amount of exercise or physical activity: he is active every day    Problems taking medications regularly: No    Medication side effects: lisinopril- cough    Diet: regular (no restrictions)            Problem list and histories reviewed & adjusted, as indicated.  Additional history: as documented  Insert a follow-up of his diabetes and COPD.  His numbers at home of been excellent.  His last A1c was 6.6 blood pressure today is 132/72.  He does need a one-time spirometry with his diagnosis of COPD.  He also needs to have a colonoscopy as I did review his last colonoscopy in 2014 he had 2 adenomatous polyps so follow-up is 3-5 years he is here for right now.  No other complaints at this time.  Urrutia been going well the warm humid here sometimes tough for him.  Concerned about a possible cough from his lisinopril but  further questioning reveals that his cough is usually in the mornings when he wakes up until he clears the mucus most likely this is secondary to his COPD.  Did talk about a little trial of ipratropium that will use over the next 2-3 weeks to see if this improves his morning cough.  He does not have much afternoon or evening cough so I doubt it is the lisinopril that is giving him symptoms more his COPD.      Patient Active Problem List   Diagnosis     Erectile dysfunction     Health Care Home     Advanced directives, counseling/discussion     Hyperlipidemia LDL goal <100     Essential hypertension with goal blood pressure less than 140/90     Chronic obstructive pulmonary disease, unspecified COPD type (H)     Simple chronic bronchitis (H)     Past Surgical History:   Procedure Laterality Date     COLONOSCOPY N/A 11/5/2014    Procedure: COMBINED COLONOSCOPY, SINGLE OR MULTIPLE BIOPSY/POLYPECTOMY BY BIOPSY;  Surgeon: Douglas Ortiz MD;  Location:  GI     FINGER SURGERY  1998    pins and repair after caught in lathe     HC REMOVE TONSILS/ADENOIDS,<10 Y/O      T & A <11y.o.       Social History   Substance Use Topics     Smoking status: Former Smoker     Packs/day: 1.00     Years: 35.00     Types: Cigarettes     Quit date: 1/4/2011     Smokeless tobacco: Never Used     Alcohol use Yes      Comment: once monthly or less     Family History   Problem Relation Age of Onset     Diabetes Mother      Diabetes Sister      Hypertension Mother      HEART DISEASE Father      Not sure     Obesity Mother          Current Outpatient Prescriptions   Medication Sig Dispense Refill     amLODIPine (NORVASC) 10 MG tablet TAKE ONE TABLET BY MOUTH ONCE DAILY 90 tablet 3     aspirin 81 MG tablet Take 1 tablet (81 mg) by mouth daily 30 tablet 11     Blood Glucose Calibration (GLUCOSE CONTROL) solution 1 drop by Test Solution route every 30 days. WENDY 1 Bottle 3     blood glucose monitoring (ACCU-CHEK WENDY) test strip Use to test  4 times daily before meals and bedtime. 3 Box 0     Blood Glucose Monitoring Suppl (ACCU-CHEK WENDY) KIT TO TEST 5 TIMES A DAY, BEFORE GIVING BOTH INSULIN 1 kit 0     glyBURIDE-metFORMIN (GLUCOVANCE) 5-500 MG per tablet Take 1 tablet by mouth 2 times daily (with meals) 180 tablet 3     ipratropium - albuterol 0.5 mg/2.5 mg/3 mL (DUONEB) 0.5-2.5 (3) MG/3ML neb solution Take 1 vial (3 mLs) by nebulization every 4 hours as needed 1 Box 4     lisinopril (PRINIVIL/ZESTRIL) 10 MG tablet Take 1 tablet (10 mg) by mouth daily 90 tablet 3     lovastatin (MEVACOR) 40 MG tablet TAKE ONE & ONE-HALF TABLETS BY MOUTH ONCE DAILY 135 tablet 1     metoprolol tartrate (LOPRESSOR) 100 MG tablet Take 1 tablet (100 mg) by mouth 2 times daily 180 tablet 3     tadalafil (CIALIS) 10 MG tablet Take 0.5-1 tablets (5-10 mg) by mouth daily as needed for erectile dysfunction Never use with nitroglycerin, terazosin or doxazosin. 5 tablet 11     ACE/ARB NOT PRESCRIBED, INTENTIONAL, 1 each daily ACE & ARB not prescribed due to Intolerance (Patient not taking: Reported on 5/17/2018)       ORDER FOR DME Neb machine for home. (Patient not taking: Reported on 8/14/2018) 1 Device 0     [DISCONTINUED] glyBURIDE-metFORMIN (GLUCOVANCE) 5-500 MG per tablet Take 2 tablets by mouth 2 times daily (with meals) 180 tablet 3     [DISCONTINUED] ipratropium - albuterol 0.5 mg/2.5 mg/3 mL (DUONEB) 0.5-2.5 (3) MG/3ML nebulization Take 1 vial (3 mLs) by nebulization every 4 hours as needed (Patient not taking: Reported on 4/6/2017) 1 Box 4     [DISCONTINUED] lisinopril (PRINIVIL/ZESTRIL) 5 MG tablet Take 1 tablet (5 mg) by mouth daily 30 tablet 1     Recent Labs   Lab Test  05/14/18   0843  10/31/17   0843  09/21/17   1514   10/19/16   0905   10/04/15   0556  10/03/15   1545  09/23/15   0929   A1C  6.6*  9.4*  11.0*   < >  6.6*   --    --    --   6.0   LDL   --   85   --    --   97   --    --    --   88   HDL   --   42   --    --   42   --    --    --   37*   TRIG    --   170*   --    --   174*   --    --    --   175*   ALT   --    --   26   --    --    --   13  14   --    CR   --    --   1.07   --   1.21   < >  1.12  1.18  1.05   GFRESTIMATED   --    --   69   --   60*   < >  66  62  71   GFRESTBLACK   --    --   83   --   72   < >  79  75  85   POTASSIUM   --    --   4.2   --   4.4   < >  3.7  3.7   --    TSH   --    --   1.19   --   2.29   --    --    --    --     < > = values in this interval not displayed.        Reviewed and updated as needed this visit by clinical staff  Tobacco  Allergies  Meds  Soc Hx      Reviewed and updated as needed this visit by Provider         ROS:  Constitutional, HEENT, cardiovascular, pulmonary, gi and gu systems are negative, except as otherwise noted.    OBJECTIVE:     /72  Pulse 81  Temp 97.8  F (36.6  C) (Tympanic)  Resp 16  Wt 238 lb (108 kg)  SpO2 97%  BMI 33.19 kg/m2  Body mass index is 33.19 kg/(m^2).   GENERAL: healthy, alert and no distress    Diagnostic Test Results:  none     ASSESSMENT:       PLAN:   1. Type 2 diabetes mellitus without complication, without long-term current use of insulin (H)    - glyBURIDE-metFORMIN (GLUCOVANCE) 5-500 MG per tablet; Take 1 tablet by mouth 2 times daily (with meals)  Dispense: 180 tablet; Refill: 3    2. Simple chronic bronchitis (H)  We will do the trial of DuoNeb for the next 2-3 weeks.  I think that his a.m. cough is more from his COPD also will review his spirometry and we may need to consider a inhaled steroid.  We did do a spirometry today he did not do very well so I think this DuoNeb trial may make a big difference for him if it does we may want to consider a Spiriva inhaler which will be much simpler.  Possibly consider inhaled steroids if the inhaled steroids make a difference will continue them if not we will discontinue with the Spiriva.  Would like to get him a one-time appointment with pulmonology to see if they have any other suggestions.  - ipratropium - albuterol  0.5 mg/2.5 mg/3 mL (DUONEB) 0.5-2.5 (3) MG/3ML neb solution; Take 1 vial (3 mLs) by nebulization every 4 hours as needed  Dispense: 1 Box; Refill: 4    3. Benign neoplasm of colon, unspecified part of colon  Patient is due for a colonoscopy sooner to the fact he had an adenomatous polyp.              Thomas Bosch MD  [facility]

## 2018-08-16 ENCOUNTER — TELEPHONE (OUTPATIENT)
Dept: FAMILY MEDICINE | Facility: CLINIC | Age: 68
End: 2018-08-16

## 2018-08-16 NOTE — LETTER

## 2018-08-16 NOTE — LETTER
[address] [id number] [phone]        August 20, 2018    Mj Ruth  [address]  [facility] [address]

## 2018-08-16 NOTE — TELEPHONE ENCOUNTER
Left message for patient to return call to schedule colonoscopy or EGD. If Gabi or Emilia are unavailable, please transfer to the surgery center.

## 2018-08-17 NOTE — TELEPHONE ENCOUNTER
Left message for patient to return call to schedule EGD/colonoscopy. If Emilia or Gabi are not available, please transfer to same day surgery

## 2018-08-20 NOTE — TELEPHONE ENCOUNTER
Date of colonoscopy/EGD: 9/4/18  Surgeon: Dr. Bernard  Prep:Miralax  Packet:Colonoscopy/EGD instructions mailed to patient's home address.   Date: 8/20/2018      Surgery Scheduler

## 2018-09-03 ENCOUNTER — ANESTHESIA EVENT (OUTPATIENT)
Dept: GASTROENTEROLOGY | Facility: CLINIC | Age: 68
End: 2018-09-03
Payer: MEDICARE

## 2018-09-04 ENCOUNTER — HOSPITAL ENCOUNTER (OUTPATIENT)
Facility: CLINIC | Age: 68
Discharge: HOME | End: 2018-09-04
Attending: SURGERY | Admitting: SURGERY
Payer: MEDICARE

## 2018-09-04 ENCOUNTER — SURGERY (OUTPATIENT)
Age: 68
End: 2018-09-04

## 2018-09-04 ENCOUNTER — ANESTHESIA (OUTPATIENT)
Dept: GASTROENTEROLOGY | Facility: CLINIC | Age: 68
End: 2018-09-04
Payer: MEDICARE

## 2018-09-04 VITALS
RESPIRATION RATE: 16 BRPM | HEART RATE: 65 BPM | SYSTOLIC BLOOD PRESSURE: 115 MMHG | TEMPERATURE: 98.2 F | OXYGEN SATURATION: 95 % | DIASTOLIC BLOOD PRESSURE: 70 MMHG

## 2018-09-04 LAB
COLONOSCOPY: NORMAL
GLUCOSE BLDC GLUCOMTR-MCNC: 108 MG/DL (ref 70–99)

## 2018-09-04 PROCEDURE — 82962 GLUCOSE BLOOD TEST: CPT

## 2018-09-04 PROCEDURE — 88305 TISSUE EXAM BY PATHOLOGIST: CPT | Mod: 26 | Performed by: SURGERY

## 2018-09-04 PROCEDURE — 25000125 ZZHC RX 250: Performed by: NURSE ANESTHETIST, CERTIFIED REGISTERED

## 2018-09-04 PROCEDURE — 88305 TISSUE EXAM BY PATHOLOGIST: CPT | Performed by: SURGERY

## 2018-09-04 PROCEDURE — 45385 COLONOSCOPY W/LESION REMOVAL: CPT | Mod: PT | Performed by: SURGERY

## 2018-09-04 PROCEDURE — 25000128 H RX IP 250 OP 636: Performed by: NURSE ANESTHETIST, CERTIFIED REGISTERED

## 2018-09-04 RX ORDER — ONDANSETRON 2 MG/ML
4 INJECTION INTRAMUSCULAR; INTRAVENOUS
Status: DISCONTINUED | OUTPATIENT
Start: 2018-09-04 | End: 2018-09-04 | Stop reason: HOSPADM

## 2018-09-04 RX ORDER — SODIUM CHLORIDE, SODIUM LACTATE, POTASSIUM CHLORIDE, CALCIUM CHLORIDE 600; 310; 30; 20 MG/100ML; MG/100ML; MG/100ML; MG/100ML
INJECTION, SOLUTION INTRAVENOUS CONTINUOUS
Status: DISCONTINUED | OUTPATIENT
Start: 2018-09-04 | End: 2018-09-04 | Stop reason: HOSPADM

## 2018-09-04 RX ORDER — NALOXONE HYDROCHLORIDE 0.4 MG/ML
.1-.4 INJECTION, SOLUTION INTRAMUSCULAR; INTRAVENOUS; SUBCUTANEOUS
Status: DISCONTINUED | OUTPATIENT
Start: 2018-09-04 | End: 2018-09-04 | Stop reason: HOSPADM

## 2018-09-04 RX ORDER — ONDANSETRON 2 MG/ML
4 INJECTION INTRAMUSCULAR; INTRAVENOUS EVERY 30 MIN PRN
Status: DISCONTINUED | OUTPATIENT
Start: 2018-09-04 | End: 2018-09-04 | Stop reason: HOSPADM

## 2018-09-04 RX ORDER — PROPOFOL 10 MG/ML
INJECTION, EMULSION INTRAVENOUS CONTINUOUS PRN
Status: DISCONTINUED | OUTPATIENT
Start: 2018-09-04 | End: 2018-09-04

## 2018-09-04 RX ORDER — PROPOFOL 10 MG/ML
INJECTION, EMULSION INTRAVENOUS PRN
Status: DISCONTINUED | OUTPATIENT
Start: 2018-09-04 | End: 2018-09-04

## 2018-09-04 RX ORDER — MEPERIDINE HYDROCHLORIDE 25 MG/ML
12.5 INJECTION INTRAMUSCULAR; INTRAVENOUS; SUBCUTANEOUS
Status: DISCONTINUED | OUTPATIENT
Start: 2018-09-04 | End: 2018-09-04 | Stop reason: HOSPADM

## 2018-09-04 RX ORDER — LIDOCAINE 40 MG/G
CREAM TOPICAL
Status: DISCONTINUED | OUTPATIENT
Start: 2018-09-04 | End: 2018-09-04 | Stop reason: HOSPADM

## 2018-09-04 RX ORDER — ONDANSETRON 4 MG/1
4 TABLET, ORALLY DISINTEGRATING ORAL EVERY 30 MIN PRN
Status: DISCONTINUED | OUTPATIENT
Start: 2018-09-04 | End: 2018-09-04 | Stop reason: HOSPADM

## 2018-09-04 RX ORDER — LIDOCAINE HYDROCHLORIDE 20 MG/ML
INJECTION, SOLUTION INFILTRATION; PERINEURAL PRN
Status: DISCONTINUED | OUTPATIENT
Start: 2018-09-04 | End: 2018-09-04

## 2018-09-04 RX ADMIN — PROPOFOL 150 MCG/KG/MIN: 10 INJECTION, EMULSION INTRAVENOUS at 10:05

## 2018-09-04 RX ADMIN — LIDOCAINE HYDROCHLORIDE 40 MG: 20 INJECTION, SOLUTION INFILTRATION; PERINEURAL at 10:03

## 2018-09-04 RX ADMIN — PROPOFOL 40 MG: 10 INJECTION, EMULSION INTRAVENOUS at 10:05

## 2018-09-04 ASSESSMENT — LIFESTYLE VARIABLES: TOBACCO_USE: 1

## 2018-09-04 ASSESSMENT — COPD QUESTIONNAIRES
COPD: 1
CAT_SEVERITY: MILD

## 2018-09-04 NOTE — H&P (VIEW-ONLY)
SUBJECTIVE:   Mj Ruht is a 68 year old male who presents to clinic today for the following health issues:      Diabetes Follow-up    Patient is checking blood sugars: once daily.  Results are as follows:         am -     Diabetic concerns: None     Symptoms of hypoglycemia (low blood sugar): none     Paresthesias (numbness or burning in feet) or sores: No     Date of last diabetic eye exam: he states he had one in June 2048. He will call and have them send us a copy    Diabetes Management Resources    Hyperlipidemia Follow-Up      Rate your low fat/cholesterol diet?: not monitoring fat    Taking statin?  Yes, no muscle aches from statin    Other lipid medications/supplements?:  none    Hypertension Follow-up      Outpatient blood pressures are being checked at home.  Results are systolic 120-130, diastolic 74.    Low Salt Diet: not monitoring salt    BP Readings from Last 2 Encounters:   08/14/18 132/72   06/14/18 140/80     Hemoglobin A1C (%)   Date Value   05/14/2018 6.6 (H)   10/31/2017 9.4 (H)     LDL Cholesterol Calculated (mg/dL)   Date Value   10/31/2017 85   10/19/2016 97       Amount of exercise or physical activity: he is active every day    Problems taking medications regularly: No    Medication side effects: lisinopril- cough    Diet: regular (no restrictions)            Problem list and histories reviewed & adjusted, as indicated.  Additional history: as documented  Insert a follow-up of his diabetes and COPD.  His numbers at home of been excellent.  His last A1c was 6.6 blood pressure today is 132/72.  He does need a one-time spirometry with his diagnosis of COPD.  He also needs to have a colonoscopy as I did review his last colonoscopy in 2014 he had 2 adenomatous polyps so follow-up is 3-5 years he is here for right now.  No other complaints at this time.  Urrutia been going well the warm humid here sometimes tough for him.  Concerned about a possible cough from his lisinopril but  further questioning reveals that his cough is usually in the mornings when he wakes up until he clears the mucus most likely this is secondary to his COPD.  Did talk about a little trial of ipratropium that will use over the next 2-3 weeks to see if this improves his morning cough.  He does not have much afternoon or evening cough so I doubt it is the lisinopril that is giving him symptoms more his COPD.      Patient Active Problem List   Diagnosis     Erectile dysfunction     Health Care Home     Advanced directives, counseling/discussion     Hyperlipidemia LDL goal <100     Essential hypertension with goal blood pressure less than 140/90     Chronic obstructive pulmonary disease, unspecified COPD type (H)     Simple chronic bronchitis (H)     Past Surgical History:   Procedure Laterality Date     COLONOSCOPY N/A 11/5/2014    Procedure: COMBINED COLONOSCOPY, SINGLE OR MULTIPLE BIOPSY/POLYPECTOMY BY BIOPSY;  Surgeon: Douglas Ortiz MD;  Location:  GI     FINGER SURGERY  1998    pins and repair after caught in lathe     HC REMOVE TONSILS/ADENOIDS,<10 Y/O      T & A <11y.o.       Social History   Substance Use Topics     Smoking status: Former Smoker     Packs/day: 1.00     Years: 35.00     Types: Cigarettes     Quit date: 1/4/2011     Smokeless tobacco: Never Used     Alcohol use Yes      Comment: once monthly or less     Family History   Problem Relation Age of Onset     Diabetes Mother      Diabetes Sister      Hypertension Mother      HEART DISEASE Father      Not sure     Obesity Mother          Current Outpatient Prescriptions   Medication Sig Dispense Refill     amLODIPine (NORVASC) 10 MG tablet TAKE ONE TABLET BY MOUTH ONCE DAILY 90 tablet 3     aspirin 81 MG tablet Take 1 tablet (81 mg) by mouth daily 30 tablet 11     Blood Glucose Calibration (GLUCOSE CONTROL) solution 1 drop by Test Solution route every 30 days. WENDY 1 Bottle 3     blood glucose monitoring (ACCU-CHEK WENDY) test strip Use to test  4 times daily before meals and bedtime. 3 Box 0     Blood Glucose Monitoring Suppl (ACCU-CHEK WENDY) KIT TO TEST 5 TIMES A DAY, BEFORE GIVING BOTH INSULIN 1 kit 0     glyBURIDE-metFORMIN (GLUCOVANCE) 5-500 MG per tablet Take 1 tablet by mouth 2 times daily (with meals) 180 tablet 3     ipratropium - albuterol 0.5 mg/2.5 mg/3 mL (DUONEB) 0.5-2.5 (3) MG/3ML neb solution Take 1 vial (3 mLs) by nebulization every 4 hours as needed 1 Box 4     lisinopril (PRINIVIL/ZESTRIL) 10 MG tablet Take 1 tablet (10 mg) by mouth daily 90 tablet 3     lovastatin (MEVACOR) 40 MG tablet TAKE ONE & ONE-HALF TABLETS BY MOUTH ONCE DAILY 135 tablet 1     metoprolol tartrate (LOPRESSOR) 100 MG tablet Take 1 tablet (100 mg) by mouth 2 times daily 180 tablet 3     tadalafil (CIALIS) 10 MG tablet Take 0.5-1 tablets (5-10 mg) by mouth daily as needed for erectile dysfunction Never use with nitroglycerin, terazosin or doxazosin. 5 tablet 11     ACE/ARB NOT PRESCRIBED, INTENTIONAL, 1 each daily ACE & ARB not prescribed due to Intolerance (Patient not taking: Reported on 5/17/2018)       ORDER FOR DME Neb machine for home. (Patient not taking: Reported on 8/14/2018) 1 Device 0     [DISCONTINUED] glyBURIDE-metFORMIN (GLUCOVANCE) 5-500 MG per tablet Take 2 tablets by mouth 2 times daily (with meals) 180 tablet 3     [DISCONTINUED] ipratropium - albuterol 0.5 mg/2.5 mg/3 mL (DUONEB) 0.5-2.5 (3) MG/3ML nebulization Take 1 vial (3 mLs) by nebulization every 4 hours as needed (Patient not taking: Reported on 4/6/2017) 1 Box 4     [DISCONTINUED] lisinopril (PRINIVIL/ZESTRIL) 5 MG tablet Take 1 tablet (5 mg) by mouth daily 30 tablet 1     Recent Labs   Lab Test  05/14/18   0843  10/31/17   0843  09/21/17   1514   10/19/16   0905   10/04/15   0556  10/03/15   1545  09/23/15   0929   A1C  6.6*  9.4*  11.0*   < >  6.6*   --    --    --   6.0   LDL   --   85   --    --   97   --    --    --   88   HDL   --   42   --    --   42   --    --    --   37*   TRIG    --   170*   --    --   174*   --    --    --   175*   ALT   --    --   26   --    --    --   13  14   --    CR   --    --   1.07   --   1.21   < >  1.12  1.18  1.05   GFRESTIMATED   --    --   69   --   60*   < >  66  62  71   GFRESTBLACK   --    --   83   --   72   < >  79  75  85   POTASSIUM   --    --   4.2   --   4.4   < >  3.7  3.7   --    TSH   --    --   1.19   --   2.29   --    --    --    --     < > = values in this interval not displayed.        Reviewed and updated as needed this visit by clinical staff  Tobacco  Allergies  Meds  Soc Hx      Reviewed and updated as needed this visit by Provider         ROS:  Constitutional, HEENT, cardiovascular, pulmonary, gi and gu systems are negative, except as otherwise noted.    OBJECTIVE:     /72  Pulse 81  Temp 97.8  F (36.6  C) (Tympanic)  Resp 16  Wt 238 lb (108 kg)  SpO2 97%  BMI 33.19 kg/m2  Body mass index is 33.19 kg/(m^2).   GENERAL: healthy, alert and no distress    Diagnostic Test Results:  none     ASSESSMENT:       PLAN:   1. Type 2 diabetes mellitus without complication, without long-term current use of insulin (H)    - glyBURIDE-metFORMIN (GLUCOVANCE) 5-500 MG per tablet; Take 1 tablet by mouth 2 times daily (with meals)  Dispense: 180 tablet; Refill: 3    2. Simple chronic bronchitis (H)  We will do the trial of DuoNeb for the next 2-3 weeks.  I think that his a.m. cough is more from his COPD also will review his spirometry and we may need to consider a inhaled steroid.  We did do a spirometry today he did not do very well so I think this DuoNeb trial may make a big difference for him if it does we may want to consider a Spiriva inhaler which will be much simpler.  Possibly consider inhaled steroids if the inhaled steroids make a difference will continue them if not we will discontinue with the Spiriva.  Would like to get him a one-time appointment with pulmonology to see if they have any other suggestions.  - ipratropium - albuterol  0.5 mg/2.5 mg/3 mL (DUONEB) 0.5-2.5 (3) MG/3ML neb solution; Take 1 vial (3 mLs) by nebulization every 4 hours as needed  Dispense: 1 Box; Refill: 4    3. Benign neoplasm of colon, unspecified part of colon  Patient is due for a colonoscopy sooner to the fact he had an adenomatous polyp.              Thomas Bosch MD  [facility]

## 2018-09-04 NOTE — ANESTHESIA POSTPROCEDURE EVALUATION
Patient: Mj Ruth    Procedure(s):  COLONOSCOPY with polypectomies by snare - Wound Class: II-Clean Contaminated    Diagnosis:Screening, Benign neoplasm of colon  Diagnosis Additional Information: No value filed.    Anesthesia Type:  MAC    Note:  Anesthesia Post Evaluation    Patient location during evaluation: Phase 2  Patient participation: Able to fully participate in evaluation  Level of consciousness: awake and alert  Pain management: adequate  Airway patency: patent  Cardiovascular status: blood pressure returned to baseline  Respiratory status: spontaneous ventilation and room air  Hydration status: acceptable  PONV: none     Anesthetic complications: None    Comments: Patient was pleased with his anesthetic today. He is currently resting without complaint. No anesthesia concerns.         Last vitals:  Vitals:    09/04/18 0931 09/04/18 1035 09/04/18 1045   BP: 144/86 (!) 81/40 (!) 85/42   Pulse: 65     Resp: 16     Temp: 98.2  F (36.8  C)     SpO2: 98%  95%         Electronically Signed By: LEONIE Curtis CRNA  September 4, 2018  11:02 AM

## 2018-09-04 NOTE — IP AVS SNAPSHOT
MRN:[id number]                      After Visit Summary   9/4/2018    Mj Ruth    MRN: [id number]           Thank you!     Thank you for choosing [facility] for your care. Our goal is always to provide you with excellent care. Hearing back from our patients is one way we can continue to improve our services. Please take a few minutes to complete the written survey that you may receive in the mail after you visit with us. Thank you!        Patient Information     Date Of Birth          1950        About your hospital stay     You were admitted on:  September 4, 2018 You last received care in the:  [facility]    You were discharged on:  September 4, 2018       Who to Call     For medical emergencies, please call 911.  For non-urgent questions about your medical care, please call your primary care provider or clinic, [phone]  For questions related to your surgery, please call your surgery clinic        Attending Provider     Provider Andi Leos,  Surgery       Primary Care Provider Office Phone # Fax #    Thomas Bosch [phone]      Your next 10 appointments already scheduled     Sep 21, 2018  2:00 PM CDT   New Visit with Abhay Correia MD   [facility]    [address] [place] [id number] [phone]              Further instructions from your care team         [facility]    Home Care Following Endoscopy          Activity:    You have just undergone an endoscopic procedure usually performed with conscious sedation.  Do not work or operate machinery (including a car) for at least 12 hours.      I encourage you to walk and attempt to pass this air as soon as possible.    Diet:    Return to the diet you were on before your procedure but eat lightly for the first 12-24 hours.    Drink plenty of water.    Resume any regular medications unless  otherwise advised by your physician.  Please begin any new medication prescribed as a result of your procedure as directed by your physician.     If you had any biopsy or polyp removed please refrain from aspirin or aspirin products for 2 days.  If on Coumadin please restart as instructed by your physician.   Pain:    You may take Tylenol as needed for pain.  Expected Recovery:    You can expect some mild abdominal fullness and/or discomfort due to the air used to inflate your intestinal tract.     Call Your Physician if You Have:      After Colonoscopy:  o Worsening persisting abdominal pain which is worse with activity.  o Fevers (>101 degrees F), chills or shakes.  o Passage of continued blood with bowel movements.     Any questions or concerns about your recovery, please call [phone] or after hours [phone] Nurse Advice Line.    Follow-up Care:    You should receive a call or letter with your results within 1 week. Please call if you have not received a notification of your results.    If asked to return to clinic please make an appointment 1 week after your procedure.  Call [phone].      Pending Results     No orders found from 9/2/2018 to 9/5/2018.            Admission Information     Date & Time Provider Department Dept. Phone    9/4/2018 Andi Bernard,  [facility] [phone]      Your Vitals Were     Blood Pressure Pulse Temperature Respirations Pulse Oximetry       85/42 65 98.2  F (36.8  C) (Oral) 16 95%       Care EveryWhere ID     This is your Care EveryWhere ID. This could be used by other organizations to access your [facility] medical records  [id number]        Equal Access to Services     [facility] ARACELY : Hadii kristen toth hadasho Socosmoali, waaxda luqadaha, qaybta kaalmada lizbethyada, adam lawrence. So [facility] [phone].    ATENCIÓN: Si habla español, tiene a meyer disposición servicios gratuitos de asistencia lingüística. Llame al  [phone].    We comply with applicable federal civil rights laws and [place] laws. We do not discriminate on the basis of race, color, national origin, age, disability, sex, sexual orientation, or gender identity.               Review of your medicines      UNREVIEWED medicines. Ask your doctor about these medicines        Dose / Directions    ACE/ARB/ARNI NOT PRESCRIBED (INTENTIONAL)   Used for:  Type 2 diabetes mellitus without complication, without long-term current use of insulin (H)        Dose:  1 each   1 each daily ACE & ARB not prescribed due to Intolerance   Refills:  0       amLODIPine 10 MG tablet   Commonly known as:  NORVASC   Used for:  Hypertension goal BP (blood pressure) < 140/90        TAKE ONE TABLET BY MOUTH ONCE DAILY   Quantity:  90 tablet   Refills:  3       aspirin 81 MG tablet   Used for:  Type 2 diabetes mellitus without complication, without long-term current use of insulin (H)        Dose:  81 mg   Take 1 tablet (81 mg) by mouth daily   Quantity:  30 tablet   Refills:  11       glyBURIDE-metFORMIN 5-500 MG per tablet   Commonly known as:  GLUCOVANCE   Used for:  Type 2 diabetes mellitus without complication, without long-term current use of insulin (H)        Dose:  1 tablet   Take 1 tablet by mouth 2 times daily (with meals)   Quantity:  180 tablet   Refills:  3       ipratropium - albuterol 0.5 mg/2.5 mg/3 mL 0.5-2.5 (3) MG/3ML neb solution   Commonly known as:  DUONEB   Used for:  Simple chronic bronchitis (H)        Dose:  3 mL   Take 1 vial (3 mLs) by nebulization every 4 hours as needed   Quantity:  1 Box   Refills:  4       lisinopril 10 MG tablet   Commonly known as:  PRINIVIL/ZESTRIL   Used for:  Hypertension goal BP (blood pressure) < 140/90        Dose:  10 mg   Take 1 tablet (10 mg) by mouth daily   Quantity:  90 tablet   Refills:  3       lovastatin 40 MG tablet   Commonly known as:  MEVACOR   Used for:  Hyperlipidemia LDL goal <100, Essential hypertension with goal  blood pressure less than 140/90        TAKE ONE & ONE-HALF TABLETS BY MOUTH ONCE DAILY   Quantity:  135 tablet   Refills:  1       metoprolol tartrate 100 MG tablet   Commonly known as:  LOPRESSOR   Used for:  Hyperlipidemia LDL goal <100, Essential hypertension with goal blood pressure less than 140/90        Dose:  100 mg   Take 1 tablet (100 mg) by mouth 2 times daily   Quantity:  180 tablet   Refills:  3       tadalafil 10 MG tablet   Commonly known as:  CIALIS   Used for:  Erectile dysfunction        Dose:  5-10 mg   Take 0.5-1 tablets (5-10 mg) by mouth daily as needed for erectile dysfunction Never use with nitroglycerin, terazosin or doxazosin.   Quantity:  5 tablet   Refills:  11         CONTINUE these medicines which have NOT CHANGED        Dose / Directions    ACCU-CHEK WENDY Kit   Used for:  Diabetes mellitus, type 2 (H)        TO TEST 5 TIMES A DAY, BEFORE GIVING BOTH INSULIN   Quantity:  1 kit   Refills:  0       blood glucose calibration solution   Commonly known as:  no brand specified   Used for:  Type 2 diabetes, HbA1c goal < 7% (H)        Dose:  1 drop   1 drop by Test Solution route every 30 days. WENDY   Quantity:  1 Bottle   Refills:  3       blood glucose monitoring test strip   Commonly known as:  ACCU-CHEK WENDY   Used for:  Type 2 diabetes mellitus without complication (H)        Use to test 4 times daily before meals and bedtime.   Quantity:  3 Box   Refills:  0       order for DME   Used for:  Wheezing, Viral bronchitis        Neb machine for home.   Quantity:  1 Device   Refills:  0                Protect others around you: Learn how to safely use, store and throw away your medicines at www.disposemymeds.org.             Medication List: This is a list of all your medications and when to take them. Check marks below indicate your daily home schedule. Keep this list as a reference.      Medications           Morning Afternoon Evening Bedtime As Needed    ACCU-CHEK WENDY Kit   TO TEST 5  TIMES A DAY, BEFORE GIVING BOTH INSULIN                                ACE/ARB/ARNI NOT PRESCRIBED (INTENTIONAL)   1 each daily ACE & ARB not prescribed due to Intolerance                                amLODIPine 10 MG tablet   Commonly known as:  NORVASC   TAKE ONE TABLET BY MOUTH ONCE DAILY                                aspirin 81 MG tablet   Take 1 tablet (81 mg) by mouth daily                                blood glucose calibration solution   Commonly known as:  no brand specified   1 drop by Test Solution route every 30 days. WENDY                                blood glucose monitoring test strip   Commonly known as:  ACCU-CHEK WENDY   Use to test 4 times daily before meals and bedtime.                                glyBURIDE-metFORMIN 5-500 MG per tablet   Commonly known as:  GLUCOVANCE   Take 1 tablet by mouth 2 times daily (with meals)                                ipratropium - albuterol 0.5 mg/2.5 mg/3 mL 0.5-2.5 (3) MG/3ML neb solution   Commonly known as:  DUONEB   Take 1 vial (3 mLs) by nebulization every 4 hours as needed                                lisinopril 10 MG tablet   Commonly known as:  PRINIVIL/ZESTRIL   Take 1 tablet (10 mg) by mouth daily                                lovastatin 40 MG tablet   Commonly known as:  MEVACOR   TAKE ONE & ONE-HALF TABLETS BY MOUTH ONCE DAILY                                metoprolol tartrate 100 MG tablet   Commonly known as:  LOPRESSOR   Take 1 tablet (100 mg) by mouth 2 times daily                                order for DME   Neb machine for home.                                tadalafil 10 MG tablet   Commonly known as:  CIALIS   Take 0.5-1 tablets (5-10 mg) by mouth daily as needed for erectile dysfunction Never use with nitroglycerin, terazosin or doxazosin.

## 2018-09-04 NOTE — ANESTHESIA CARE TRANSFER NOTE
Patient: Mj Ruth    Procedure(s):  COLONOSCOPY with polypectomies by snare - Wound Class: II-Clean Contaminated    Diagnosis: Screening, Benign neoplasm of colon  Diagnosis Additional Information: No value filed.    Anesthesia Type:   MAC     Note:  Airway :Room Air  Patient transferred to:Phase II  Handoff Report: Identifed the Patient, Identified the Reponsible Provider, Reviewed the pertinent medical history, Discussed the surgical course, Reviewed Intra-OP anesthesia mangement and issues during anesthesia, Set expectations for post-procedure period and Allowed opportunity for questions and acknowledgement of understanding      Vitals: (Last set prior to Anesthesia Care Transfer)    CRNA VITALS  9/4/2018 1005 - 9/4/2018 1041      9/4/2018             Pulse: 61    SpO2: 96 %    Resp Rate (observed): 15                Electronically Signed By: LEONIE Curtis CRNA  September 4, 2018  10:41 AM

## 2018-09-04 NOTE — ANESTHESIA PREPROCEDURE EVALUATION
Anesthesia Evaluation     . Pt has had prior anesthetic. Type: General           ROS/MED HX    ENT/Pulmonary:     (+)tobacco use, Past use mild COPD, , . .    Neurologic:  - neg neurologic ROS     Cardiovascular:     (+) hypertension----. : . . . :. . Previous cardiac testing date:results:Stress Testdate:4/16/2004 results:Normal stress testECG reviewed date:4/12/12 results:NSR date: results:          METS/Exercise Tolerance:     Hematologic:  - neg hematologic  ROS       Musculoskeletal:  - neg musculoskeletal ROS       GI/Hepatic:  - neg GI/hepatic ROS       Renal/Genitourinary:         Endo:     (+) type II DM Last HgA1c: 6.6 date: 5/4/18 Not using insulin - not using insulin pump not previously admitted for DM/DKA .      Psychiatric:  - neg psychiatric ROS       Infectious Disease:  - neg infectious disease ROS       Malignancy:      - no malignancy   Other:    (+) No chance of pregnancy C-spine cleared: N/A, no H/O Chronic Pain,                   Physical Exam  Normal systems: cardiovascular and pulmonary    Airway   Mallampati: II  TM distance: >3 FB  Neck ROM: full    Dental     Cardiovascular   Rhythm and rate: regular and normal      Pulmonary    breath sounds clear to auscultation                    Anesthesia Plan      History & Physical Review  History and physical reviewed and following examination; no interval change.    ASA Status:  3 .    NPO Status:  > 8 hours    Plan for MAC with Intravenous and Propofol induction. Maintenance will be TIVA.  Reason for MAC:  Deep or markedly invasive procedure (G8)         Postoperative Care  Postoperative pain management:  Oral pain medications.      Consents  Anesthetic plan, risks, benefits and alternatives discussed with:  Patient..                          .

## 2018-09-04 NOTE — LETTER
Mj Ruth  [address] [facility] [address]    September 14, 2018      Dear Mj,  This letter is to inform you of the results of your pathology report from your colonoscopy.  Your pathology report was:  FINAL DIAGNOSIS:   A. Cecal polyp:   - Tubular adenoma.   - Negative for high grade dysplasia and malignancy.     B. Right ascending colon polyp::   - Tubular adenoma.   - Negative for high grade dysplasia and malignancy.     C. Colon polyp, transverse:   - Tubular adenoma.   - Negative for high grade dysplasia and malignancy.     D. Sigmoid colon polyp:   - Non-diagnostic, fecal material only without tissue for evaluation.     E. Rectal polyp:   - Hyperplastic polyp.    These are benign polyps. These types of polyps do carry a small risk of developing into a cancer over time if not removed. Yours were completely removed at the time of your colonoscopy. Because of the type of polyp and number of polyps found, you should have another surveillance colonoscopy in 3 years.  If you have further questions please don t hesitate to call our clinic at [phone].   Sincerely,     Andi Bernard, DO

## 2018-09-04 NOTE — DISCHARGE INSTRUCTIONS
[facility]    Home Care Following Endoscopy          Activity:    You have just undergone an endoscopic procedure usually performed with conscious sedation.  Do not work or operate machinery (including a car) for at least 12 hours.      I encourage you to walk and attempt to pass this air as soon as possible.    Diet:    Return to the diet you were on before your procedure but eat lightly for the first 12-24 hours.    Drink plenty of water.    Resume any regular medications unless otherwise advised by your physician.  Please begin any new medication prescribed as a result of your procedure as directed by your physician.     If you had any biopsy or polyp removed please refrain from aspirin or aspirin products for 2 days.  If on Coumadin please restart as instructed by your physician.   Pain:    You may take Tylenol as needed for pain.  Expected Recovery:    You can expect some mild abdominal fullness and/or discomfort due to the air used to inflate your intestinal tract.     Call Your Physician if You Have:      After Colonoscopy:  o Worsening persisting abdominal pain which is worse with activity.  o Fevers (>101 degrees F), chills or shakes.  o Passage of continued blood with bowel movements.     Any questions or concerns about your recovery, please call [phone] or after hours [phone] Nurse Advice Line.    Follow-up Care:    You should receive a call or letter with your results within 1 week. Please call if you have not received a notification of your results.    If asked to return to clinic please make an appointment 1 week after your procedure.  Call [phone].

## 2018-09-04 NOTE — IP AVS SNAPSHOT
[facility] Endoscopy    [address] [facility] [address]    Phone:  [phone]                                       After Visit Summary   9/4/2018    Mj Ruth    MRN: [id number]           After Visit Summary Signature Page     I have received my discharge instructions, and my questions have been answered. I have discussed any challenges I see with this plan with the nurse or doctor.    ..........................................................................................................................................  Patient/Patient Representative Signature      ..........................................................................................................................................  Patient Representative Print Name and Relationship to Patient    ..................................................               ................................................  Date                                            Time    ..........................................................................................................................................  Reviewed by Signature/Title    ...................................................              ..............................................  Date                                                            Time          22EPIC Rev 08/18

## 2018-09-11 LAB — COPATH REPORT: NORMAL

## 2018-11-20 DIAGNOSIS — I10 ESSENTIAL HYPERTENSION WITH GOAL BLOOD PRESSURE LESS THAN 140/90: ICD-10-CM

## 2018-11-20 DIAGNOSIS — E78.5 HYPERLIPIDEMIA LDL GOAL <100: ICD-10-CM

## 2018-11-21 RX ORDER — LOVASTATIN 40 MG
TABLET ORAL
Qty: 135 TABLET | Refills: 0 | Status: SHIPPED | OUTPATIENT
Start: 2018-11-21 | End: 2019-02-27

## 2018-11-21 NOTE — TELEPHONE ENCOUNTER
"Mevacor  Last Written Prescription Date:  06/12/2018  Last Fill Quantity: 135,  # refills: 1   Last office visit: 8/14/2018 with prescribing provider:  Danitza   Future Office Visit:    Medication is being filled for 1 time refill only due to:  Future labs ordered LDL. .    Requested Prescriptions   Pending Prescriptions Disp Refills     lovastatin (MEVACOR) 40 MG tablet [Pharmacy Med Name: LOVASTATIN 40MG     TAB] 135 tablet 1     Sig: TAKE 1 & 1/2 (ONE & ONE-HALF) TABLETS BY MOUTH ONCE DAILY    Statins Protocol Failed    11/20/2018 12:16 PM       Failed - LDL on file in past 12 months    Recent Labs   Lab Test  10/31/17   0843   LDL  85          Passed - No abnormal creatine kinase in past 12 months    No lab results found.        Passed - Recent (12 mo) or future (30 days) visit within the authorizing provider's specialty    Patient had office visit in the last 12 months or has a visit in the next 30 days with authorizing provider or within the authorizing provider's specialty.  See \"Patient Info\" tab in inbasket, or \"Choose Columns\" in Meds & Orders section of the refill encounter.        Passed - Patient is age 18 or older      Yuliet Donohue RN   "

## 2019-02-27 DIAGNOSIS — I10 ESSENTIAL HYPERTENSION WITH GOAL BLOOD PRESSURE LESS THAN 140/90: ICD-10-CM

## 2019-02-27 DIAGNOSIS — E78.5 HYPERLIPIDEMIA LDL GOAL <100: ICD-10-CM

## 2019-02-27 NOTE — TELEPHONE ENCOUNTER
"Mevacor  Last Written Prescription Date:  11/21/2018  Last Fill Quantity: 135,  # refills: 0   Last office visit: 8/14/2018 with prescribing provider:  Danitza   Future Office Visit:  None  Routing refill request to provider for review/approval because:  Labs not current:  LDL.     Requested Prescriptions   Pending Prescriptions Disp Refills     lovastatin (MEVACOR) 40 MG tablet [Pharmacy Med Name: LOVASTATIN 40MG     TAB] 135 tablet 0     Sig: TAKE 1 & 1/2 (ONE & ONE-HALF) TABLETS BY MOUTH ONCE DAILY    Statins Protocol Failed - 2/27/2019 12:45 PM       Failed - LDL on file in past 12 months    Recent Labs   Lab Test 10/31/17  0843   LDL 85          Passed - No abnormal creatine kinase in past 12 months    No lab results found.        Passed - Recent (12 mo) or future (30 days) visit within the authorizing provider's specialty    Patient had office visit in the last 12 months or has a visit in the next 30 days with authorizing provider or within the authorizing provider's specialty.  See \"Patient Info\" tab in inbasket, or \"Choose Columns\" in Meds & Orders section of the refill encounter.         Passed - Medication is active on med list       Passed - Patient is age 18 or older      Yuliet Donohue RN   "

## 2019-02-28 RX ORDER — LOVASTATIN 40 MG
TABLET ORAL
Qty: 135 TABLET | Refills: 0 | Status: SHIPPED | OUTPATIENT
Start: 2019-02-28 | End: 2019-04-09

## 2019-02-28 NOTE — TELEPHONE ENCOUNTER
Have the patient come in to get a lipid profile done we will fill his meds one time also have him make an appointment for follow-up for his diabetes he needs to be seen every 6 months

## 2019-02-28 NOTE — TELEPHONE ENCOUNTER
Called pt and he doesn't know when he will be back from vacation. He has 2 vacations scheduled. He will call us when he knows schedule and will make lab only appt. I did set up his diabetic follow up.  Madelyn Costa, [facility]

## 2019-04-04 DIAGNOSIS — E78.5 HYPERLIPIDEMIA LDL GOAL <100: ICD-10-CM

## 2019-04-04 DIAGNOSIS — I10 HYPERTENSION GOAL BP (BLOOD PRESSURE) < 140/90: ICD-10-CM

## 2019-04-04 LAB
CHOLEST SERPL-MCNC: 145 MG/DL
CREAT SERPL-MCNC: 1.18 MG/DL (ref 0.66–1.25)
GFR SERPL CREATININE-BSD FRML MDRD: 62 ML/MIN/{1.73_M2}
HDLC SERPL-MCNC: 38 MG/DL
LDLC SERPL CALC-MCNC: 76 MG/DL
NONHDLC SERPL-MCNC: 107 MG/DL
TRIGL SERPL-MCNC: 154 MG/DL

## 2019-04-04 PROCEDURE — 80061 LIPID PANEL: CPT | Performed by: FAMILY MEDICINE

## 2019-04-04 PROCEDURE — 36415 COLL VENOUS BLD VENIPUNCTURE: CPT | Performed by: FAMILY MEDICINE

## 2019-04-04 PROCEDURE — 82565 ASSAY OF CREATININE: CPT | Performed by: FAMILY MEDICINE

## 2019-04-09 ENCOUNTER — OFFICE VISIT (OUTPATIENT)
Dept: FAMILY MEDICINE | Facility: CLINIC | Age: 69
End: 2019-04-09
Payer: COMMERCIAL

## 2019-04-09 VITALS
BODY MASS INDEX: 33.2 KG/M2 | TEMPERATURE: 97.4 F | DIASTOLIC BLOOD PRESSURE: 72 MMHG | HEART RATE: 87 BPM | SYSTOLIC BLOOD PRESSURE: 130 MMHG | HEIGHT: 71 IN | RESPIRATION RATE: 13 BRPM | WEIGHT: 237.13 LBS | OXYGEN SATURATION: 95 %

## 2019-04-09 DIAGNOSIS — E78.5 HYPERLIPIDEMIA LDL GOAL <100: ICD-10-CM

## 2019-04-09 DIAGNOSIS — I10 ESSENTIAL HYPERTENSION WITH GOAL BLOOD PRESSURE LESS THAN 140/90: ICD-10-CM

## 2019-04-09 DIAGNOSIS — J41.0 SIMPLE CHRONIC BRONCHITIS (H): ICD-10-CM

## 2019-04-09 DIAGNOSIS — F33.0 MAJOR DEPRESSIVE DISORDER, RECURRENT EPISODE, MILD (H): ICD-10-CM

## 2019-04-09 DIAGNOSIS — E11.9 TYPE 2 DIABETES MELLITUS WITHOUT COMPLICATION, WITHOUT LONG-TERM CURRENT USE OF INSULIN (H): Primary | ICD-10-CM

## 2019-04-09 DIAGNOSIS — Z87.891 PERSONAL HISTORY OF NICOTINE DEPENDENCE: ICD-10-CM

## 2019-04-09 DIAGNOSIS — I10 HYPERTENSION GOAL BP (BLOOD PRESSURE) < 140/90: ICD-10-CM

## 2019-04-09 DIAGNOSIS — E11.9 TYPE 2 DIABETES MELLITUS WITHOUT COMPLICATION, WITHOUT LONG-TERM CURRENT USE OF INSULIN (H): ICD-10-CM

## 2019-04-09 LAB
ANION GAP SERPL CALCULATED.3IONS-SCNC: 7 MMOL/L (ref 3–14)
BUN SERPL-MCNC: 18 MG/DL (ref 7–30)
CALCIUM SERPL-MCNC: 8.9 MG/DL (ref 8.5–10.1)
CHLORIDE SERPL-SCNC: 104 MMOL/L (ref 94–109)
CO2 SERPL-SCNC: 27 MMOL/L (ref 20–32)
CREAT SERPL-MCNC: 1.26 MG/DL (ref 0.66–1.25)
CREAT UR-MCNC: 183 MG/DL
GFR SERPL CREATININE-BSD FRML MDRD: 58 ML/MIN/{1.73_M2}
GLUCOSE SERPL-MCNC: 206 MG/DL (ref 70–99)
HBA1C MFR BLD: 6.6 % (ref 0–5.6)
HGB BLD-MCNC: 15.6 G/DL (ref 13.3–17.7)
MICROALBUMIN UR-MCNC: 16 MG/L
MICROALBUMIN/CREAT UR: 8.96 MG/G CR (ref 0–17)
POTASSIUM SERPL-SCNC: 4.2 MMOL/L (ref 3.4–5.3)
SODIUM SERPL-SCNC: 138 MMOL/L (ref 133–144)

## 2019-04-09 PROCEDURE — 36415 COLL VENOUS BLD VENIPUNCTURE: CPT | Performed by: FAMILY MEDICINE

## 2019-04-09 PROCEDURE — 80048 BASIC METABOLIC PNL TOTAL CA: CPT | Performed by: FAMILY MEDICINE

## 2019-04-09 PROCEDURE — 83036 HEMOGLOBIN GLYCOSYLATED A1C: CPT | Performed by: FAMILY MEDICINE

## 2019-04-09 PROCEDURE — 82043 UR ALBUMIN QUANTITATIVE: CPT | Performed by: FAMILY MEDICINE

## 2019-04-09 PROCEDURE — 99214 OFFICE O/P EST MOD 30 MIN: CPT | Performed by: FAMILY MEDICINE

## 2019-04-09 PROCEDURE — 85018 HEMOGLOBIN: CPT | Performed by: FAMILY MEDICINE

## 2019-04-09 RX ORDER — LOVASTATIN 40 MG
TABLET ORAL
Qty: 135 TABLET | Refills: 3 | Status: SHIPPED | OUTPATIENT
Start: 2019-04-09 | End: 2020-04-28

## 2019-04-09 RX ORDER — IPRATROPIUM BROMIDE AND ALBUTEROL SULFATE 2.5; .5 MG/3ML; MG/3ML
3 SOLUTION RESPIRATORY (INHALATION) EVERY 4 HOURS PRN
Qty: 1 BOX | Refills: 4 | Status: SHIPPED | OUTPATIENT
Start: 2019-04-09 | End: 2019-04-09

## 2019-04-09 RX ORDER — METOPROLOL TARTRATE 100 MG
100 TABLET ORAL 2 TIMES DAILY
Qty: 180 TABLET | Refills: 3 | Status: SHIPPED | OUTPATIENT
Start: 2019-04-09 | End: 2020-04-28

## 2019-04-09 RX ORDER — GLYBURIDE-METFORMIN HYDROCHLORIDE 5; 500 MG/1; MG/1
1 TABLET ORAL 2 TIMES DAILY WITH MEALS
Qty: 180 TABLET | Refills: 3 | Status: SHIPPED | OUTPATIENT
Start: 2019-04-09 | End: 2020-04-28

## 2019-04-09 RX ORDER — AMLODIPINE BESYLATE 10 MG/1
10 TABLET ORAL DAILY
Qty: 90 TABLET | Refills: 3 | Status: SHIPPED | OUTPATIENT
Start: 2019-04-09 | End: 2020-04-28

## 2019-04-09 RX ORDER — LISINOPRIL 10 MG/1
10 TABLET ORAL DAILY
Qty: 90 TABLET | Refills: 3 | Status: SHIPPED | OUTPATIENT
Start: 2019-04-09 | End: 2020-04-28

## 2019-04-09 RX ORDER — PAROXETINE 20 MG/1
20 TABLET, FILM COATED ORAL EVERY MORNING
Qty: 90 TABLET | Refills: 3 | Status: SHIPPED | OUTPATIENT
Start: 2019-04-09 | End: 2020-03-04

## 2019-04-09 RX ORDER — IPRATROPIUM BROMIDE AND ALBUTEROL SULFATE 2.5; .5 MG/3ML; MG/3ML
3 SOLUTION RESPIRATORY (INHALATION) EVERY 4 HOURS PRN
Qty: 90 VIAL | Refills: 4 | Status: SHIPPED | OUTPATIENT
Start: 2019-04-09 | End: 2023-04-06

## 2019-04-09 SDOH — HEALTH STABILITY: MENTAL HEALTH: HOW OFTEN DO YOU HAVE A DRINK CONTAINING ALCOHOL?: MONTHLY OR LESS

## 2019-04-09 ASSESSMENT — MIFFLIN-ST. JEOR: SCORE: 1862.72

## 2019-04-09 ASSESSMENT — PAIN SCALES - GENERAL: PAINLEVEL: NO PAIN (0)

## 2019-04-09 ASSESSMENT — PATIENT HEALTH QUESTIONNAIRE - PHQ9: SUM OF ALL RESPONSES TO PHQ QUESTIONS 1-9: 18

## 2019-04-09 NOTE — LETTER
My COPD Action Plan     Name: Mj Ruth    YOB: 1950   Date: 4/9/2019    My doctor: Thomas Bosch MD, MD   My clinic: [address] [address] [phone]  My Controller Medicine:    Dose:      My Rescue Medicine: Albuterol nebulizer solution    Dose:      My Flare Up Medicine:    Dose:      My COPD Severity:       Use of Oxygen: Oxygen Not Prescribed      Make sure you've had your pneumonia   vaccines.          GREEN ZONE       Doing well today      Usual level of activity and exercise    Usual amount of cough and mucus    No shortness of breath    Usual level of health (thinking clearly, sleeping well, feel like eating) Actions:      Take daily medicines    Use oxygen as prescribed    Follow regular exercise and diet plan    Avoid cigarette smoke and other irritants that harm the lungs           YELLOW ZONE          Having a bad day or flare up      Short of breath more than usual    A lot more sputum (mucus) than usual    Sputum looks yellow, green, tan, brown or bloody    More coughing or wheezing    Fever or chills    Less energy; trouble completing activities    Trouble thinking or focusing    Using quick relief inhaler or nebulizer more often    Poor sleep; symptoms wake me up    Do not feel like eating Actions:      Get plenty of rest    Take daily medicines    Use quick relief inhaler every 4-6 hours    If you use oxygen, call you doctor to see if you should adjust your oxygen    Do breathing exercises or other things to help you relax    Let a loved one, friend or neighbor know you are feeling worse    Call your care team if you have 2 or more symptoms.  Start taking steroids or antibiotics if directed by your care team           RED ZONE       Need medical care now      Severe shortness of breath (feel you can't breathe)    Fever, chills    Not enough breath to do any activity    Trouble coughing up mucus, walking or  talking    Blood in mucus    Frequent coughing   Rescue medicines are not working    Not able to sleep because of breathing    Feel confused or drowsy    Chest pain    Actions:      Call your health care team.  If you cannot reach your care team, call 911 or go to the emergency room.        Annual Reminders:  Meet with Care Team, Flu Shot every Fall  Pharmacy:    [facility] - [place], [place] - [address]  [facility] - [place], [place] - [address]

## 2019-04-09 NOTE — TELEPHONE ENCOUNTER
Per pharmacy, for medicare complience issues prescription for Accu-check test strips and ipratropium need to reflect a quantity in numbers instead of how is was sent over for boxes to be dispensed. Will route to PCP to send new RX  Africa Singh CMA

## 2019-04-16 ENCOUNTER — HOSPITAL ENCOUNTER (OUTPATIENT)
Dept: CT IMAGING | Facility: CLINIC | Age: 69
Discharge: HOME | End: 2019-04-16
Attending: FAMILY MEDICINE | Admitting: FAMILY MEDICINE
Payer: MEDICARE

## 2019-04-16 DIAGNOSIS — Z87.891 PERSONAL HISTORY OF NICOTINE DEPENDENCE: ICD-10-CM

## 2019-04-16 PROCEDURE — G0297 LDCT FOR LUNG CA SCREEN: HCPCS

## 2019-04-18 ENCOUNTER — TELEPHONE (OUTPATIENT)
Dept: FAMILY MEDICINE | Facility: CLINIC | Age: 69
End: 2019-04-18

## 2019-05-07 ENCOUNTER — OFFICE VISIT (OUTPATIENT)
Dept: FAMILY MEDICINE | Facility: CLINIC | Age: 69
End: 2019-05-07
Payer: COMMERCIAL

## 2019-05-07 VITALS
RESPIRATION RATE: 18 BRPM | WEIGHT: 235.4 LBS | DIASTOLIC BLOOD PRESSURE: 66 MMHG | HEART RATE: 69 BPM | BODY MASS INDEX: 32.83 KG/M2 | SYSTOLIC BLOOD PRESSURE: 122 MMHG | OXYGEN SATURATION: 96 % | TEMPERATURE: 97.4 F

## 2019-05-07 DIAGNOSIS — E11.9 TYPE 2 DIABETES MELLITUS WITHOUT COMPLICATION, WITHOUT LONG-TERM CURRENT USE OF INSULIN (H): ICD-10-CM

## 2019-05-07 DIAGNOSIS — F33.0 MILD RECURRENT MAJOR DEPRESSION (H): Primary | ICD-10-CM

## 2019-05-07 PROCEDURE — 99213 OFFICE O/P EST LOW 20 MIN: CPT | Performed by: FAMILY MEDICINE

## 2019-05-07 ASSESSMENT — PATIENT HEALTH QUESTIONNAIRE - PHQ9: SUM OF ALL RESPONSES TO PHQ QUESTIONS 1-9: 12

## 2019-05-07 ASSESSMENT — PAIN SCALES - GENERAL: PAINLEVEL: NO PAIN (0)

## 2019-05-07 NOTE — LETTER
My COPD Action Plan     Name: Mj Ruth    YOB: 1950   Date: 5/7/2019    My doctor: Thomas Bosch MD, MD   My clinic: [address] [address] [phone]  My Controller Medicine:    Dose:      My Rescue Medicine: Albuterol (Proair/Ventolin/Proventil) inhaler   Dose:      My Flare Up Medicine:    Dose:      My COPD Severity:       Use of Oxygen: Oxygen Not Prescribed      Make sure you've had your pneumonia   vaccines.          GREEN ZONE       Doing well today      Usual level of activity and exercise    Usual amount of cough and mucus    No shortness of breath    Usual level of health (thinking clearly, sleeping well, feel like eating) Actions:      Take daily medicines    Use oxygen as prescribed    Follow regular exercise and diet plan    Avoid cigarette smoke and other irritants that harm the lungs           YELLOW ZONE          Having a bad day or flare up      Short of breath more than usual    A lot more sputum (mucus) than usual    Sputum looks yellow, green, tan, brown or bloody    More coughing or wheezing    Fever or chills    Less energy; trouble completing activities    Trouble thinking or focusing    Using quick relief inhaler or nebulizer more often    Poor sleep; symptoms wake me up    Do not feel like eating Actions:      Get plenty of rest    Take daily medicines    Use quick relief inhaler every 4-6 hours    If you use oxygen, call you doctor to see if you should adjust your oxygen    Do breathing exercises or other things to help you relax    Let a loved one, friend or neighbor know you are feeling worse    Call your care team if you have 2 or more symptoms.  Start taking steroids or antibiotics if directed by your care team           RED ZONE       Need medical care now      Severe shortness of breath (feel you can't breathe)    Fever, chills    Not enough breath to do any activity    Trouble coughing up mucus,  walking or talking    Blood in mucus    Frequent coughing   Rescue medicines are not working    Not able to sleep because of breathing    Feel confused or drowsy    Chest pain    Actions:      Call your health care team.  If you cannot reach your care team, call 911 or go to the emergency room.        Annual Reminders:  Meet with Care Team, Flu Shot every Fall  Pharmacy:    [facility] - [place], [place] - [address]  [facility] - [place], [place] - [address]

## 2019-05-07 NOTE — PROGRESS NOTES
SUBJECTIVE:   Mj Ruth is a 68 year old male who presents to clinic today for the following   health issues:      Depression Followup    Status since last visit: Improved     See PHQ-9 for current symptoms.  Other associated symptoms: None    Complicating factors:   Significant life event:  No   Current substance abuse:  None  Anxiety or Panic symptoms:  No    PHQ 9/29/2017 5/17/2018 4/9/2019   PHQ-9 Total Score 0 9 18   Q9: Thoughts of better off dead/self-harm past 2 weeks Not at all Not at all Several days   F/U: Thoughts of suicide or self-harm - - No   F/U: Safety concerns - - No       PHQ-9  English  PHQ-9   Any Language  Suicide Assessment Five-step Evaluation and Treatment (SAFE-T)    Amount of exercise or physical activity: 1 day/week for an average of 15-30 minutes    Problems taking medications regularly: No    Medication side effects: none    Diet: regular (no restrictions)            Additional history: as documented  Patient actually states he is feeling significantly better on the Paxil.  No side effects.  He is anxious for the fishing season he does not go out on the opener because of the congestion.  But will go out a week or 2 after.  No other complaints concerns at this time  Reviewed  and updated as needed this visit by clinical staff  Tobacco  Allergies  Meds  Med Hx  Surg Hx  Fam Hx  Soc Hx        Reviewed and updated as needed this visit by Provider         Patient Active Problem List   Diagnosis     Erectile dysfunction     Health Care Home     Advanced directives, counseling/discussion     Hyperlipidemia LDL goal <100     Essential hypertension with goal blood pressure less than 140/90     Chronic obstructive pulmonary disease, unspecified COPD type (H)     Simple chronic bronchitis (H)     Benign neoplasm of colon, unspecified part of colon     Past Surgical History:   Procedure Laterality Date     COLONOSCOPY N/A 11/5/2014    Procedure: COMBINED COLONOSCOPY, SINGLE OR  MULTIPLE BIOPSY/POLYPECTOMY BY BIOPSY;  Surgeon: Douglas Ortiz MD;  Location:  GI     FINGER SURGERY      pins and repair after caught in lathe     HC REMOVE TONSILS/ADENOIDS,<13 Y/O      T & A <11y.o.       Social History     Tobacco Use     Smoking status: Former Smoker     Packs/day: 1.00     Years: 35.00     Pack years: 35.00     Types: Cigarettes     Last attempt to quit: 2011     Years since quittin.3     Smokeless tobacco: Never Used   Substance Use Topics     Alcohol use: Yes     Frequency: Monthly or less     Comment: once monthly or less     Family History   Problem Relation Age of Onset     Heart Disease Father         Not sure     Diabetes Mother      Hypertension Mother      Obesity Mother      Diabetes Sister          Current Outpatient Medications   Medication Sig Dispense Refill     amLODIPine (NORVASC) 10 MG tablet Take 1 tablet (10 mg) by mouth daily 90 tablet 3     aspirin 81 MG tablet Take 1 tablet (81 mg) by mouth daily 30 tablet 11     blood glucose (ACCU-CHEK WENDY) test strip Use to test 4 times daily before meals and bedtime. 300 strip 0     Blood Glucose Calibration (GLUCOSE CONTROL) solution 1 drop by Test Solution route every 30 days. WENDY 1 Bottle 3     Blood Glucose Monitoring Suppl (ACCU-CHEK WENDY) KIT TO TEST 5 TIMES A DAY, BEFORE GIVING BOTH INSULIN 1 kit 0     glyBURIDE-metFORMIN (GLUCOVANCE) 5-500 MG tablet Take 1 tablet by mouth 2 times daily (with meals) 180 tablet 3     ipratropium - albuterol 0.5 mg/2.5 mg/3 mL (DUONEB) 0.5-2.5 (3) MG/3ML neb solution Take 1 vial (3 mLs) by nebulization every 4 hours as needed 90 vial 4     lisinopril (PRINIVIL/ZESTRIL) 10 MG tablet Take 1 tablet (10 mg) by mouth daily 90 tablet 3     lovastatin (MEVACOR) 40 MG tablet TAKE 1 & 1/2 (ONE & ONE-HALF) TABLETS BY MOUTH ONCE DAILY 135 tablet 3     metoprolol tartrate (LOPRESSOR) 100 MG tablet Take 1 tablet (100 mg) by mouth 2 times daily 180 tablet 3     PARoxetine (PAXIL) 20  MG tablet Take 1 tablet (20 mg) by mouth every morning 90 tablet 3     ACE/ARB NOT PRESCRIBED, INTENTIONAL, 1 each daily ACE & ARB not prescribed due to Intolerance (Patient not taking: Reported on 4/9/2019)       ORDER FOR DME Neb machine for home. 1 Device 0     tadalafil (CIALIS) 10 MG tablet Take 0.5-1 tablets (5-10 mg) by mouth daily as needed for erectile dysfunction Never use with nitroglycerin, terazosin or doxazosin. (Patient not taking: Reported on 5/7/2019) 5 tablet 11       ROS:  Constitutional, HEENT, cardiovascular, pulmonary, gi and gu systems are negative, except as otherwise noted.    OBJECTIVE:     /66   Pulse 69   Temp 97.4  F (36.3  C) (Temporal)   Resp 18   Wt 106.8 kg (235 lb 6.4 oz)   SpO2 96%   BMI 32.83 kg/m    Body mass index is 32.83 kg/m .   GENERAL: healthy, alert and no distress        ASSESSMENT:     Encounter Diagnoses   Name Primary?     Type 2 diabetes mellitus without complication, without long-term current use of insulin (H)      Mild recurrent major depression (H) Yes         PLAN:     We will keep the patient was Paxil 20 mg p.o. daily I will see him back in 3 to 6 months time.  He does need ophthalmology referral for follow-up of his diabetes.        - OPHTHALMOLOGY ADULT REFERRAL            Thomas Bosch MD  [facility]

## 2019-05-29 ENCOUNTER — OFFICE VISIT (OUTPATIENT)
Dept: FAMILY MEDICINE | Facility: CLINIC | Age: 69
End: 2019-05-29
Payer: COMMERCIAL

## 2019-05-29 VITALS
WEIGHT: 232.5 LBS | RESPIRATION RATE: 16 BRPM | DIASTOLIC BLOOD PRESSURE: 60 MMHG | OXYGEN SATURATION: 96 % | BODY MASS INDEX: 32.43 KG/M2 | HEART RATE: 74 BPM | TEMPERATURE: 98.1 F | SYSTOLIC BLOOD PRESSURE: 116 MMHG

## 2019-05-29 DIAGNOSIS — L98.9 SKIN LESION: Primary | ICD-10-CM

## 2019-05-29 PROCEDURE — 11105 PUNCH BX SKIN EA SEP/ADDL: CPT | Performed by: FAMILY MEDICINE

## 2019-05-29 PROCEDURE — 11104 PUNCH BX SKIN SINGLE LESION: CPT | Performed by: FAMILY MEDICINE

## 2019-05-29 PROCEDURE — 88305 TISSUE EXAM BY PATHOLOGIST: CPT | Mod: TC | Performed by: FAMILY MEDICINE

## 2019-05-29 ASSESSMENT — PAIN SCALES - GENERAL: PAINLEVEL: NO PAIN (0)

## 2019-05-29 NOTE — LETTER
[address] [id number] [phone]        June 11, 2019    Mj Ruth  [address] [facility] [address]          Dear Mj,    LAB RESULTS:     The results of your recent Tests were Your skin biopsies were all normal no evidence of cancer nothing to worry about .  If you have any further questions or problems, please contact our office at [phone].        Sincerely,        Thomas Bosch M.D.

## 2019-05-31 LAB — COPATH REPORT: NORMAL

## 2019-09-13 ENCOUNTER — TRANSFERRED RECORDS (OUTPATIENT)
Dept: HEALTH INFORMATION MANAGEMENT | Facility: CLINIC | Age: 69
End: 2019-09-13

## 2019-11-27 ENCOUNTER — TELEPHONE (OUTPATIENT)
Dept: FAMILY MEDICINE | Facility: CLINIC | Age: 69
End: 2019-11-27

## 2019-11-27 NOTE — TELEPHONE ENCOUNTER
Patient is due for a PHQ-9.  Index start date:8/10/2019  Index end date:12/08/2019    Please call patient.

## 2020-01-27 NOTE — TELEPHONE ENCOUNTER
Cleared up medication questions   
Danitza states he will call him.  Madelyn Costa, [facility]    
Reason for Call:  Other prescription    Detailed comments: Patient was seen today by Dr. Bosch. He has some questions about the prescriptions that he was given. Please call him back so he can explain.     Phone Number Patient can be reached at: Home number on file [phone] (home)    Best Time: any    Can we leave a detailed message on this number? YES    Call taken on 9/27/2017 at 10:50 AM by Alycia Cardenas      
no

## 2020-03-04 DIAGNOSIS — F33.0 MAJOR DEPRESSIVE DISORDER, RECURRENT EPISODE, MILD (H): ICD-10-CM

## 2020-03-04 RX ORDER — PAROXETINE 20 MG/1
TABLET, FILM COATED ORAL
Qty: 30 TABLET | Refills: 0 | Status: SHIPPED | OUTPATIENT
Start: 2020-03-04 | End: 2020-04-28

## 2020-03-04 NOTE — TELEPHONE ENCOUNTER
"paroxitine  Last Written Prescription Date:  04/09/2019  Last Fill Quantity: 90,  # refills: 3   Last office visit: 5/29/2019 with prescribing provider:  Danitza  Future Office Visit:  None      Requested Prescriptions   Pending Prescriptions Disp Refills     PARoxetine (PAXIL) 20 MG tablet [Pharmacy Med Name: PARoxetine HCl 20 MG Oral Tablet]  0     Sig: TAKE 1 TABLET BY MOUTH IN THE MORNING       SSRIs Protocol Failed - 3/4/2020 10:47 AM        Failed - PHQ-9 score less than 5 in past 6 months     Please review last PHQ-9 score.           Failed - Recent (6 mo) or future (30 days) visit within the authorizing provider's specialty     Patient had office visit in the last 6 months or has a visit in the next 30 days with authorizing provider or within the authorizing provider's specialty.  See \"Patient Info\" tab in inbasket, or \"Choose Columns\" in Meds & Orders section of the refill encounter.            Passed - Medication is active on med list        Passed - Patient is age 18 or older        PHQ-9 score:    PHQ 5/7/2019   PHQ-9 Total Score 12   Q9: Thoughts of better off dead/self-harm past 2 weeks Several days   F/U: Thoughts of suicide or self-harm -   F/U: Safety concerns -       Medication is being filled for 1 time refill only due to:  Patient needs to be seen because it has been more than one year since last visit.   Brenda Ward RN BSN            "

## 2020-04-22 ENCOUNTER — TELEPHONE (OUTPATIENT)
Dept: FAMILY MEDICINE | Facility: CLINIC | Age: 70
End: 2020-04-22

## 2020-04-22 NOTE — TELEPHONE ENCOUNTER
Patient Quality Outreach      Summary:    Patient is due/failing the following:   Eye Exam and Diabetic Follow-Up Visit    Type of outreach:    Phone, spoke to patient/parent. made a phone visit with Danitza for 20    Questions for provider review:    None                                                                                   **Start Working phrase here:**       Patient has the following on his problem list/HM:     Depression / Dysthymia review    6 Month Remission: 4-8 month window range:   12 Month Remission: 10-14 month window range:        PHQ-9 SCORE 2018   PHQ-9 Total Score - - -   PHQ-9 Total Score 9 18 12       If PHQ-9 recheck is 5 or more, route to provider for next steps.    Diabetes    Last A1C:  Lab Results   Component Value Date    A1C 6.6 2019    A1C 6.6 2018       Last LDL:    Lab Results   Component Value Date    LDL 76 2019       Is the patient on a Statin? Yes          Is the patient on Aspirin? Yes    Medications     HMG CoA Reductase Inhibitors     lovastatin (MEVACOR) 40 MG tablet       Salicylates     aspirin 81 MG tablet             Last three blood pressure readings:  BP Readings from Last 3 Encounters:   19 116/60   19 122/66   19 130/72            Tobacco Use      Smoking status: Former Smoker        Packs/day: 1.00        Years: 35.00        Pack years: 35        Types: Cigarettes        Quit date: 2011        Years since quittin.3      Smokeless tobacco: Never Used          Hypertension   Last three blood pressure readings:  BP Readings from Last 3 Encounters:   19 116/60   19 122/66   19 130/72     Blood pressure: Passed    HTN Guidelines:  ? 139/89                                                 Madelyn Costa, [facility]       Chart routed to none.

## 2020-04-28 ENCOUNTER — VIRTUAL VISIT (OUTPATIENT)
Dept: FAMILY MEDICINE | Facility: CLINIC | Age: 70
End: 2020-04-28
Payer: COMMERCIAL

## 2020-04-28 DIAGNOSIS — I10 HYPERTENSION GOAL BP (BLOOD PRESSURE) < 140/90: ICD-10-CM

## 2020-04-28 DIAGNOSIS — E11.9 TYPE 2 DIABETES MELLITUS WITHOUT COMPLICATION, WITHOUT LONG-TERM CURRENT USE OF INSULIN (H): ICD-10-CM

## 2020-04-28 DIAGNOSIS — I10 ESSENTIAL HYPERTENSION WITH GOAL BLOOD PRESSURE LESS THAN 140/90: ICD-10-CM

## 2020-04-28 DIAGNOSIS — E78.5 HYPERLIPIDEMIA LDL GOAL <100: ICD-10-CM

## 2020-04-28 DIAGNOSIS — F33.0 MAJOR DEPRESSIVE DISORDER, RECURRENT EPISODE, MILD (H): ICD-10-CM

## 2020-04-28 PROCEDURE — 99214 OFFICE O/P EST MOD 30 MIN: CPT | Mod: TEL | Performed by: FAMILY MEDICINE

## 2020-04-28 RX ORDER — GLYBURIDE-METFORMIN HYDROCHLORIDE 5; 500 MG/1; MG/1
1 TABLET ORAL 2 TIMES DAILY WITH MEALS
Qty: 180 TABLET | Refills: 3 | Status: SHIPPED | OUTPATIENT
Start: 2020-04-28 | End: 2021-05-12

## 2020-04-28 RX ORDER — AMLODIPINE BESYLATE 10 MG/1
10 TABLET ORAL DAILY
Qty: 90 TABLET | Refills: 3 | Status: SHIPPED | OUTPATIENT
Start: 2020-04-28 | End: 2021-05-12

## 2020-04-28 RX ORDER — LISINOPRIL 10 MG/1
10 TABLET ORAL DAILY
Qty: 90 TABLET | Refills: 3 | Status: SHIPPED | OUTPATIENT
Start: 2020-04-28 | End: 2021-05-12

## 2020-04-28 RX ORDER — METOPROLOL TARTRATE 100 MG
100 TABLET ORAL 2 TIMES DAILY
Qty: 180 TABLET | Refills: 3 | Status: SHIPPED | OUTPATIENT
Start: 2020-04-28 | End: 2021-05-12

## 2020-04-28 RX ORDER — LOVASTATIN 40 MG
TABLET ORAL
Qty: 135 TABLET | Refills: 3 | Status: SHIPPED | OUTPATIENT
Start: 2020-04-28 | End: 2021-05-12

## 2020-04-28 RX ORDER — PAROXETINE 20 MG/1
TABLET, FILM COATED ORAL
Qty: 90 TABLET | Refills: 3 | Status: SHIPPED | OUTPATIENT
Start: 2020-04-28 | End: 2021-09-30

## 2020-04-28 ASSESSMENT — PATIENT HEALTH QUESTIONNAIRE - PHQ9: SUM OF ALL RESPONSES TO PHQ QUESTIONS 1-9: 15

## 2020-04-28 NOTE — PROGRESS NOTES
"Mj Ruth is a 69 year old male who is being evaluated via a billable telephone visit.      The patient has been notified of following:     \"This telephone visit will be conducted via a call between you and your physician/provider. We have found that certain health care needs can be provided without the need for a physical exam.  This service lets us provide the care you need with a short phone conversation.  If a prescription is necessary we can send it directly to your pharmacy.  If lab work is needed we can place an order for that and you can then stop by our lab to have the test done at a later time.    Telephone visits are billed at different rates depending on your insurance coverage. During this emergency period, for some insurers they may be billed the same as an in-person visit.  Please reach out to your insurance provider with any questions.    If during the course of the call the physician/provider feels a telephone visit is not appropriate, you will not be charged for this service.\"    Patient has given verbal consent for Telephone visit?  Yes    How would you like to obtain your AVS? Mail a copy    Subjective     Mj Ruth is a 69 year old male who presents to clinic today for the following health issues:    HPI      Patient states he is in his usual state of good health.  He has taken his medications as directed his blood pressures been 130s over 70s his blood sugar this morning was 110.  He is getting bored and he may be a little depressed because he cannot get out and about and do stuff since he 69s years old and has these comorbid conditions with the pandemic going on.  [place] loves to fish so he is get a maybe try to get out there in the next couple of days.  I did answer his questions his satisfaction.  He had no concerns I talked to him about getting in getting some laboratory studies done to make sure that were managing his health " conditions appropriately.  When I have the numbers I will call him back if we need to make any adjustments to medications I did refill all his medications for him today.  I did talk about coronavirus safety and answered his questions in this regard also.      Review of Systems   ROS COMP: Constitutional, HEENT, cardiovascular, pulmonary, gi and gu systems are negative, except as otherwise noted.       Objective   Reported vitals:  There were no vitals taken for this visit.   healthy, alert and no distress  PSYCH: Alert and oriented times 3; coherent speech, normal   rate and volume, able to articulate logical thoughts, able   to abstract reason, no tangential thoughts, no hallucinations   or delusions  His affect is normal  RESP: No cough, no audible wheezing, able to talk in full sentences  Remainder of exam unable to be completed due to telephone visits            Assessment/Plan:  1. Hypertension goal BP (blood pressure) < 140/90    - amLODIPine (NORVASC) 10 MG tablet; Take 1 tablet (10 mg) by mouth daily  Dispense: 90 tablet; Refill: 3  - lisinopril (ZESTRIL) 10 MG tablet; Take 1 tablet (10 mg) by mouth daily  Dispense: 90 tablet; Refill: 3  - **Basic metabolic panel FUTURE 2mo; Future  - Albumin Random Urine Quantitative with Creat Ratio; Future    2. Type 2 diabetes mellitus without complication, without long-term current use of insulin (H)    - blood glucose (ACCU-CHEK WENDY) test strip; Use to test 4 times daily before meals and bedtime.  Dispense: 300 strip; Refill: 3  - glyBURIDE-metFORMIN (GLUCOVANCE) 5-500 MG tablet; Take 1 tablet by mouth 2 times daily (with meals)  Dispense: 180 tablet; Refill: 3  - **A1C FUTURE 3mo; Future    3. Hyperlipidemia LDL goal <100    - lovastatin (MEVACOR) 40 MG tablet; TAKE 1 & 1/2 (ONE & ONE-HALF) TABLETS BY MOUTH ONCE DAILY  Dispense: 135 tablet; Refill: 3  - metoprolol tartrate (LOPRESSOR) 100 MG tablet; Take 1 tablet (100 mg) by mouth 2 times daily  Dispense: 180  tablet; Refill: 3  - Lipid panel reflex to direct LDL Fasting; Future    4. Essential hypertension with goal blood pressure less than 140/90    - lovastatin (MEVACOR) 40 MG tablet; TAKE 1 & 1/2 (ONE & ONE-HALF) TABLETS BY MOUTH ONCE DAILY  Dispense: 135 tablet; Refill: 3  - metoprolol tartrate (LOPRESSOR) 100 MG tablet; Take 1 tablet (100 mg) by mouth 2 times daily  Dispense: 180 tablet; Refill: 3    5. Major depressive disorder, recurrent episode, mild (H)    - PARoxetine (PAXIL) 20 MG tablet; TAKE 1 TABLET BY MOUTH IN THE MORNING  Dispense: 90 tablet; Refill: 3    Patient will get in for screening laboratory studies I will contact him with results.    Phone call duration:  11 minutes    Thomas Bosch MD, MD

## 2020-05-06 DIAGNOSIS — I10 HYPERTENSION GOAL BP (BLOOD PRESSURE) < 140/90: ICD-10-CM

## 2020-05-06 DIAGNOSIS — E78.5 HYPERLIPIDEMIA LDL GOAL <100: ICD-10-CM

## 2020-05-06 DIAGNOSIS — E11.9 TYPE 2 DIABETES MELLITUS WITHOUT COMPLICATION, WITHOUT LONG-TERM CURRENT USE OF INSULIN (H): ICD-10-CM

## 2020-05-06 LAB
ANION GAP SERPL CALCULATED.3IONS-SCNC: 2 MMOL/L (ref 3–14)
BUN SERPL-MCNC: 15 MG/DL (ref 7–30)
CALCIUM SERPL-MCNC: 8.3 MG/DL (ref 8.5–10.1)
CHLORIDE SERPL-SCNC: 108 MMOL/L (ref 94–109)
CHOLEST SERPL-MCNC: 152 MG/DL
CO2 SERPL-SCNC: 32 MMOL/L (ref 20–32)
CREAT SERPL-MCNC: 1.12 MG/DL (ref 0.66–1.25)
CREAT UR-MCNC: 182 MG/DL
GFR SERPL CREATININE-BSD FRML MDRD: 66 ML/MIN/{1.73_M2}
GLUCOSE SERPL-MCNC: 157 MG/DL (ref 70–99)
HBA1C MFR BLD: 7.5 % (ref 0–5.6)
HDLC SERPL-MCNC: 42 MG/DL
LDLC SERPL CALC-MCNC: 82 MG/DL
MICROALBUMIN UR-MCNC: 36 MG/L
MICROALBUMIN/CREAT UR: 19.89 MG/G CR (ref 0–17)
NONHDLC SERPL-MCNC: 110 MG/DL
POTASSIUM SERPL-SCNC: 4.1 MMOL/L (ref 3.4–5.3)
SODIUM SERPL-SCNC: 142 MMOL/L (ref 133–144)
TRIGL SERPL-MCNC: 142 MG/DL

## 2020-05-06 PROCEDURE — 80061 LIPID PANEL: CPT | Performed by: FAMILY MEDICINE

## 2020-05-06 PROCEDURE — 36415 COLL VENOUS BLD VENIPUNCTURE: CPT | Performed by: FAMILY MEDICINE

## 2020-05-06 PROCEDURE — 82043 UR ALBUMIN QUANTITATIVE: CPT | Performed by: FAMILY MEDICINE

## 2020-05-06 PROCEDURE — 83036 HEMOGLOBIN GLYCOSYLATED A1C: CPT | Mod: QW | Performed by: FAMILY MEDICINE

## 2020-05-06 PROCEDURE — 80048 BASIC METABOLIC PNL TOTAL CA: CPT | Performed by: FAMILY MEDICINE

## 2021-03-09 ENCOUNTER — IMMUNIZATION (OUTPATIENT)
Dept: FAMILY MEDICINE | Facility: CLINIC | Age: 71
End: 2021-03-09
Payer: COMMERCIAL

## 2021-03-09 PROCEDURE — 0001A PR COVID VAC PFIZER DIL RECON 30 MCG/0.3 ML IM: CPT

## 2021-03-09 PROCEDURE — 91300 PR COVID VAC PFIZER DIL RECON 30 MCG/0.3 ML IM: CPT

## 2021-03-29 NOTE — PROGRESS NOTES
SUBJECTIVE:                                                    Mj Ruth is a 68 year old male who presents to clinic today for the following health issues:      Diabetes Follow-up    Patient is checking blood sugars: once daily.  Results are as follows:         am - before breakfast     Diabetic concerns: None     Symptoms of hypoglycemia (low blood sugar): none     Paresthesias (numbness or burning in feet) or sores: No     Date of last diabetic eye exam: he is due    BP Readings from Last 2 Encounters:   04/09/19 130/72   09/04/18 115/70     Hemoglobin A1C (%)   Date Value   05/14/2018 6.6 (H)   10/31/2017 9.4 (H)     LDL Cholesterol Calculated (mg/dL)   Date Value   04/04/2019 76   10/31/2017 85     Mj is here for diabetes follow up. He checks his fasting glucose every morning and it typically runs between . He has had no symptoms of low blood sugar. He watches his portion sizes of food which works well for him. No regular exercise. No blurry vision.     Concerned about depression. He has had no ambition. He has been procrastinating and putting off simple tasks that he knows he should do. Has not been sleeping as much as normal, only a few hours at a time. Also has decreased appetite. Has been having thoughts of hopelessness and worthlessness. No suicidal ideation or thoughts of harm. Was previously on 20 mg Paxil following the death of his wife which worked well for him. Interested in going back on medication. Not interested in counseling at this time.     Also requesting refills on his regular medications. COPD is stable, he does not need to use his inhaler regularly. No shortness of breath, no coughing.    Diabetes Management Resources  Hypertension Follow-up      Outpatient blood pressures are not being checked.    Low Salt Diet: low salt      Amount of exercise or physical activity: None    Problems taking medications regularly: No    Medication side effects: none    Diet: low  carb    Problem list and histories reviewed & adjusted, as indicated.  Additional history: as documented    Current Outpatient Medications   Medication Sig Dispense Refill     amLODIPine (NORVASC) 10 MG tablet Take 1 tablet (10 mg) by mouth daily 90 tablet 3     aspirin 81 MG tablet Take 1 tablet (81 mg) by mouth daily 30 tablet 11     blood glucose (ACCU-CHEK WENDY) test strip Use to test 4 times daily before meals and bedtime. 3 Box 0     Blood Glucose Calibration (GLUCOSE CONTROL) solution 1 drop by Test Solution route every 30 days. WENDY 1 Bottle 3     Blood Glucose Monitoring Suppl (ACCU-CHEK WENDY) KIT TO TEST 5 TIMES A DAY, BEFORE GIVING BOTH INSULIN 1 kit 0     glyBURIDE-metFORMIN (GLUCOVANCE) 5-500 MG tablet Take 1 tablet by mouth 2 times daily (with meals) 180 tablet 3     ipratropium - albuterol 0.5 mg/2.5 mg/3 mL (DUONEB) 0.5-2.5 (3) MG/3ML neb solution Take 1 vial (3 mLs) by nebulization every 4 hours as needed 1 Box 4     lisinopril (PRINIVIL/ZESTRIL) 10 MG tablet Take 1 tablet (10 mg) by mouth daily 90 tablet 3     lovastatin (MEVACOR) 40 MG tablet TAKE 1 & 1/2 (ONE & ONE-HALF) TABLETS BY MOUTH ONCE DAILY 135 tablet 3     metoprolol tartrate (LOPRESSOR) 100 MG tablet Take 1 tablet (100 mg) by mouth 2 times daily 180 tablet 3     ORDER FOR DME Neb machine for home. 1 Device 0     PARoxetine (PAXIL) 20 MG tablet Take 1 tablet (20 mg) by mouth every morning 90 tablet 3     tadalafil (CIALIS) 10 MG tablet Take 0.5-1 tablets (5-10 mg) by mouth daily as needed for erectile dysfunction Never use with nitroglycerin, terazosin or doxazosin. 5 tablet 11     ACE/ARB NOT PRESCRIBED, INTENTIONAL, 1 each daily ACE & ARB not prescribed due to Intolerance (Patient not taking: Reported on 4/9/2019)       Allergies   Allergen Reactions     No Known Drug Allergies        ROS:  Constitutional, HEENT, cardiovascular, pulmonary, gi and gu systems are negative, except as otherwise noted.    OBJECTIVE:     /72    "Pulse 87   Temp 97.4  F (36.3  C) (Tympanic)   Resp 13   Ht 1.803 m (5' 11\")   Wt 107.6 kg (237 lb 2 oz)   SpO2 95%   BMI 33.07 kg/m    Body mass index is 33.07 kg/m .  GENERAL: healthy, alert and no distress  EYES: Eyes grossly normal to inspection, PERRL and conjunctivae and sclerae normal  RESP: lungs clear to auscultation - no rales, rhonchi or wheezes  CV: regular rate and rhythm, normal S1 S2, no S3 or S4, no murmur, click or rub, no peripheral edema and peripheral pulses strong  MS: no gross musculoskeletal defects noted, no edema    Diagnostic Test Results:  No results found for this or any previous visit (from the past 24 hour(s)).    ASSESSMENT/PLAN:       ICD-10-CM    1. Major depressive disorder, recurrent episode, mild (H) F33.0 PARoxetine (PAXIL) 20 MG tablet   2. Hypertension goal BP (blood pressure) < 140/90 I10 BASIC METABOLIC PANEL     Albumin Random Urine Quantitative with Creat Ratio     amLODIPine (NORVASC) 10 MG tablet     lisinopril (PRINIVIL/ZESTRIL) 10 MG tablet   3. Hyperlipidemia LDL goal <100 E78.5 lovastatin (MEVACOR) 40 MG tablet     metoprolol tartrate (LOPRESSOR) 100 MG tablet   4. Essential hypertension with goal blood pressure less than 140/90 I10 lovastatin (MEVACOR) 40 MG tablet     metoprolol tartrate (LOPRESSOR) 100 MG tablet   5. Type 2 diabetes mellitus without complication, without long-term current use of insulin (H) E11.9 Hemoglobin     HEMOGLOBIN A1C     glyBURIDE-metFORMIN (GLUCOVANCE) 5-500 MG tablet     blood glucose (ACCU-CHEK WENDY) test strip   6. Simple chronic bronchitis (H) J41.0 ipratropium - albuterol 0.5 mg/2.5 mg/3 mL (DUONEB) 0.5-2.5 (3) MG/3ML neb solution   Patient's depression not currently controlled but no suicidal ideation. Will start Paxil 20 mg as this worked well for him in the past with no side effects. Patient declined counseling at this time. Follow up in one month for depression recheck.    Hypertension under good control, blood pressure " 130/72 today. Will refill medications. Will also check albumin and notify patient with results.    Diabetes seems to be under good control with fasting glucose between  in the mornings. Will recheck A1C today and notify patient with results. Will refill glyburide-metformin.     COPD stable with no current symptoms. Will refill inhaler.    Patient was seen and examined by myself and Dr Bosch.  The note was then scribed by me.  Shannan Phan, MS3    I spent 25 minutes with this patient over 50% of the time was in healthcare counseling, careplan development, strategies for partnering in keeping this patient healthy and appropriate referrals and follow up         I agree with the PFSH and ROS as completed by the MS, .  The remainder of the encounter was performed by me and scribed by the MS.  The scribed note accurately reflects my personal services and the decisions made by me.    Thomas Bosch MD, MD  [facility]         Performed Resulted

## 2021-03-30 ENCOUNTER — IMMUNIZATION (OUTPATIENT)
Dept: FAMILY MEDICINE | Facility: CLINIC | Age: 71
End: 2021-03-30
Attending: FAMILY MEDICINE
Payer: COMMERCIAL

## 2021-03-30 PROCEDURE — 0002A PR COVID VAC PFIZER DIL RECON 30 MCG/0.3 ML IM: CPT

## 2021-03-30 PROCEDURE — 91300 PR COVID VAC PFIZER DIL RECON 30 MCG/0.3 ML IM: CPT

## 2021-05-10 DIAGNOSIS — E78.5 HYPERLIPIDEMIA LDL GOAL <100: ICD-10-CM

## 2021-05-10 DIAGNOSIS — E11.9 TYPE 2 DIABETES MELLITUS WITHOUT COMPLICATION, WITHOUT LONG-TERM CURRENT USE OF INSULIN (H): ICD-10-CM

## 2021-05-10 DIAGNOSIS — I10 ESSENTIAL HYPERTENSION WITH GOAL BLOOD PRESSURE LESS THAN 140/90: ICD-10-CM

## 2021-05-10 DIAGNOSIS — I10 HYPERTENSION GOAL BP (BLOOD PRESSURE) < 140/90: ICD-10-CM

## 2021-05-11 NOTE — TELEPHONE ENCOUNTER
"Requested Prescriptions   Pending Prescriptions Disp Refills     lovastatin (MEVACOR) 40 MG tablet [Pharmacy Med Name: Lovastatin 40 MG Oral Tablet] 135 tablet 0     Sig: TAKE 1 & 1/2 (ONE & ONE-HALF) TABLETS BY MOUTH ONCE DAILY       Statins Protocol Failed - 5/10/2021 11:13 AM        Failed - LDL on file in past 12 months     Recent Labs   Lab Test 05/06/20  0907   LDL 82             Passed - No abnormal creatine kinase in past 12 months     No lab results found.             Passed - Recent (12 mo) or future (30 days) visit within the authorizing provider's specialty     Patient has had an office visit with the authorizing provider or a provider within the authorizing providers department within the previous 12 mos or has a future within next 30 days. See \"Patient Info\" tab in inbasket, or \"Choose Columns\" in Meds & Orders section of the refill encounter.              Passed - Medication is active on med list        Passed - Patient is age 18 or older           amLODIPine (NORVASC) 10 MG tablet [Pharmacy Med Name: amLODIPine Besylate 10 MG Oral Tablet] 90 tablet 0     Sig: Take 1 tablet by mouth once daily       Calcium Channel Blockers Protocol  Failed - 5/10/2021 11:13 AM        Failed - Blood pressure under 140/90 in past 12 months     BP Readings from Last 3 Encounters:   05/29/19 116/60   05/07/19 122/66   04/09/19 130/72                 Failed - Normal serum creatinine on file in past 12 months     Recent Labs   Lab Test 05/06/20  0907   CR 1.12       Ok to refill medication if creatinine is low          Passed - Recent (12 mo) or future (30 days) visit within the authorizing provider's specialty     Patient has had an office visit with the authorizing provider or a provider within the authorizing providers department within the previous 12 mos or has a future within next 30 days. See \"Patient Info\" tab in inbasket, or \"Choose Columns\" in Meds & Orders section of the refill encounter.              Passed - " "Medication is active on med list        Passed - Patient is age 18 or older           glyBURIDE-metFORMIN (GLUCOVANCE) 5-500 MG tablet [Pharmacy Med Name: glyBURIDE-metFORMIN 5-500 MG Oral Tablet] 180 tablet 0     Sig: TAKE 1 TABLET BY MOUTH TWICE DAILY WITH MEALS       Combination Oral Antihyperglycemic Agents Failed - 5/10/2021 11:13 AM        Failed - Patient has documented A1c within the specified period of time.     If HgbA1C is 8 or greater, it needs to be on file within the past 3 months.  If less than 8, must be on file within the past 6 months.     Recent Labs   Lab Test 05/06/20  0907   A1C 7.5*             Failed - Patient's CR is NOT>1.4 OR Patient's EGFR is NOT<45 within past 12 mos.     Recent Labs   Lab Test 05/06/20  0907   GFRESTIMATED 66   GFRESTBLACK 76       Recent Labs   Lab Test 05/06/20  0907   CR 1.12             Failed - Recent (6 mo) or future (30 days) visit within the authorizing provider's specialty     Patient had office visit in the last 6 months or has a visit in the next 30 days with authorizing provider or within the authorizing provider's specialty.  See \"Patient Info\" tab in inbasket, or \"Choose Columns\" in Meds & Orders section of the refill encounter.            Passed - Patient does not have a diagnosis of CHF.        Passed - Medication is active on med list        Passed - Patient is 18 years old or older.           metoprolol tartrate (LOPRESSOR) 100 MG tablet [Pharmacy Med Name: Metoprolol Tartrate 100 MG Oral Tablet] 180 tablet 0     Sig: Take 1 tablet by mouth twice daily       Beta-Blockers Protocol Failed - 5/10/2021 11:13 AM        Failed - Blood pressure under 140/90 in past 12 months     BP Readings from Last 3 Encounters:   05/29/19 116/60   05/07/19 122/66   04/09/19 130/72                 Passed - Patient is age 6 or older        Passed - Recent (12 mo) or future (30 days) visit within the authorizing provider's specialty     Patient has had an office visit with " "the authorizing provider or a provider within the authorizing providers department within the previous 12 mos or has a future within next 30 days. See \"Patient Info\" tab in inbasket, or \"Choose Columns\" in Meds & Orders section of the refill encounter.              Passed - Medication is active on med list           lisinopril (ZESTRIL) 10 MG tablet [Pharmacy Med Name: Lisinopril 10 MG Oral Tablet] 90 tablet 0     Sig: Take 1 tablet by mouth once daily       ACE Inhibitors (Including Combos) Protocol Failed - 5/10/2021 11:13 AM        Failed - Blood pressure under 140/90 in past 12 months     BP Readings from Last 3 Encounters:   05/29/19 116/60   05/07/19 122/66   04/09/19 130/72                 Failed - Normal serum creatinine on file in past 12 months     Recent Labs   Lab Test 05/06/20  0907   CR 1.12       Ok to refill medication if creatinine is low          Failed - Normal serum potassium on file in past 12 months     Recent Labs   Lab Test 05/06/20  0907   POTASSIUM 4.1             Passed - Recent (12 mo) or future (30 days) visit within the authorizing provider's specialty     Patient has had an office visit with the authorizing provider or a provider within the authorizing providers department within the previous 12 mos or has a future within next 30 days. See \"Patient Info\" tab in inbasket, or \"Choose Columns\" in Meds & Orders section of the refill encounter.              Passed - Medication is active on med list        Passed - Patient is age 18 or older           All above medications refilled for 1 year supply on 4/28/2020   Last office visit with Danitza 4/28/2020 with annual labs    Routing refill request to provider for review/approval because:  Labs not current:  > 1 year  Would provider like face to face this year or will another virtual appointment suffice?            Candace Llanos RN  [facility]                   "

## 2021-05-12 RX ORDER — AMLODIPINE BESYLATE 10 MG/1
TABLET ORAL
Qty: 90 TABLET | Refills: 0 | Status: SHIPPED | OUTPATIENT
Start: 2021-05-12 | End: 2021-09-04

## 2021-05-12 RX ORDER — LISINOPRIL 10 MG/1
TABLET ORAL
Qty: 90 TABLET | Refills: 0 | Status: SHIPPED | OUTPATIENT
Start: 2021-05-12 | End: 2021-09-04

## 2021-05-12 RX ORDER — METOPROLOL TARTRATE 100 MG
TABLET ORAL
Qty: 180 TABLET | Refills: 0 | Status: SHIPPED | OUTPATIENT
Start: 2021-05-12 | End: 2021-09-04

## 2021-05-12 RX ORDER — LOVASTATIN 40 MG
TABLET ORAL
Qty: 135 TABLET | Refills: 0 | Status: SHIPPED | OUTPATIENT
Start: 2021-05-12 | End: 2021-09-04

## 2021-05-12 RX ORDER — GLYBURIDE-METFORMIN HYDROCHLORIDE 5; 500 MG/1; MG/1
TABLET ORAL
Qty: 180 TABLET | Refills: 0 | Status: SHIPPED | OUTPATIENT
Start: 2021-05-12 | End: 2021-09-04

## 2021-08-31 ENCOUNTER — TELEPHONE (OUTPATIENT)
Dept: FAMILY MEDICINE | Facility: CLINIC | Age: 71
End: 2021-08-31

## 2021-08-31 DIAGNOSIS — E11.9 TYPE 2 DIABETES MELLITUS WITHOUT COMPLICATION, WITHOUT LONG-TERM CURRENT USE OF INSULIN (H): ICD-10-CM

## 2021-08-31 DIAGNOSIS — E78.5 HYPERLIPIDEMIA LDL GOAL <100: ICD-10-CM

## 2021-08-31 DIAGNOSIS — I10 HYPERTENSION GOAL BP (BLOOD PRESSURE) < 140/90: ICD-10-CM

## 2021-08-31 DIAGNOSIS — I10 ESSENTIAL HYPERTENSION WITH GOAL BLOOD PRESSURE LESS THAN 140/90: ICD-10-CM

## 2021-09-02 NOTE — TELEPHONE ENCOUNTER
Gyburide-Metformin  Routing refill request to provider for review/approval because:  Labs not current:  A1C, Creatinine   Diabetic visit needed    Norvasc, Lisinopril, Metoprolol  Routing refill request to provider for review/approval because:  Patient needs to be seen because it has been more than 1 year since last office visit.  No current BP on file.     Lovastatin  Routing refill request to provider for review/approval because:  Labs not current:  LDL  Patient needs to be seen because it has been more than 1 year since last office visit.    Yuliet Donohue RN

## 2021-09-02 NOTE — TELEPHONE ENCOUNTER
Patient called about refills. He is going out of town this weekend and he is out of his medication.

## 2021-09-04 RX ORDER — GLYBURIDE-METFORMIN HYDROCHLORIDE 5; 500 MG/1; MG/1
TABLET ORAL
Qty: 180 TABLET | Refills: 0 | Status: SHIPPED | OUTPATIENT
Start: 2021-09-04 | End: 2021-09-30

## 2021-09-04 RX ORDER — LOVASTATIN 40 MG
TABLET ORAL
Qty: 135 TABLET | Refills: 0 | Status: SHIPPED | OUTPATIENT
Start: 2021-09-04 | End: 2021-09-30

## 2021-09-04 RX ORDER — AMLODIPINE BESYLATE 10 MG/1
TABLET ORAL
Qty: 90 TABLET | Refills: 0 | Status: SHIPPED | OUTPATIENT
Start: 2021-09-04 | End: 2021-09-30

## 2021-09-04 RX ORDER — LISINOPRIL 10 MG/1
TABLET ORAL
Qty: 90 TABLET | Refills: 0 | Status: SHIPPED | OUTPATIENT
Start: 2021-09-04 | End: 2021-09-30

## 2021-09-04 RX ORDER — METOPROLOL TARTRATE 100 MG
TABLET ORAL
Qty: 180 TABLET | Refills: 0 | Status: SHIPPED | OUTPATIENT
Start: 2021-09-04 | End: 2021-09-30

## 2021-09-04 NOTE — TELEPHONE ENCOUNTER
Needs to see Dr. Bosch for office visit and labs. Meds were approved    Electronically signed by:  Dustin Luque M.D.  9/4/2021

## 2021-09-30 ENCOUNTER — OFFICE VISIT (OUTPATIENT)
Dept: FAMILY MEDICINE | Facility: CLINIC | Age: 71
End: 2021-09-30
Payer: COMMERCIAL

## 2021-09-30 ENCOUNTER — TELEPHONE (OUTPATIENT)
Dept: FAMILY MEDICINE | Facility: CLINIC | Age: 71
End: 2021-09-30

## 2021-09-30 VITALS
SYSTOLIC BLOOD PRESSURE: 120 MMHG | TEMPERATURE: 97.4 F | DIASTOLIC BLOOD PRESSURE: 78 MMHG | RESPIRATION RATE: 18 BRPM | HEART RATE: 78 BPM | OXYGEN SATURATION: 99 % | BODY MASS INDEX: 31.29 KG/M2 | WEIGHT: 231 LBS | HEIGHT: 72 IN

## 2021-09-30 DIAGNOSIS — Z87.891 PERSONAL HISTORY OF NICOTINE DEPENDENCE: ICD-10-CM

## 2021-09-30 DIAGNOSIS — E78.5 HYPERLIPIDEMIA LDL GOAL <100: ICD-10-CM

## 2021-09-30 DIAGNOSIS — Z12.2 ENCOUNTER FOR SCREENING FOR LUNG CANCER: ICD-10-CM

## 2021-09-30 DIAGNOSIS — Z12.11 COLON CANCER SCREENING: Primary | ICD-10-CM

## 2021-09-30 DIAGNOSIS — I10 ESSENTIAL HYPERTENSION WITH GOAL BLOOD PRESSURE LESS THAN 140/90: ICD-10-CM

## 2021-09-30 DIAGNOSIS — E11.9 TYPE 2 DIABETES MELLITUS WITHOUT COMPLICATION, WITHOUT LONG-TERM CURRENT USE OF INSULIN (H): ICD-10-CM

## 2021-09-30 DIAGNOSIS — F33.0 MAJOR DEPRESSIVE DISORDER, RECURRENT EPISODE, MILD (H): ICD-10-CM

## 2021-09-30 DIAGNOSIS — I10 HYPERTENSION GOAL BP (BLOOD PRESSURE) < 140/90: ICD-10-CM

## 2021-09-30 LAB
ANION GAP SERPL CALCULATED.3IONS-SCNC: 4 MMOL/L (ref 3–14)
BUN SERPL-MCNC: 14 MG/DL (ref 7–30)
CALCIUM SERPL-MCNC: 8.9 MG/DL (ref 8.5–10.1)
CHLORIDE BLD-SCNC: 108 MMOL/L (ref 94–109)
CHOLEST SERPL-MCNC: 154 MG/DL
CO2 SERPL-SCNC: 28 MMOL/L (ref 20–32)
CREAT SERPL-MCNC: 1.15 MG/DL (ref 0.66–1.25)
CREAT UR-MCNC: 124 MG/DL
FASTING STATUS PATIENT QL REPORTED: YES
GFR SERPL CREATININE-BSD FRML MDRD: 63 ML/MIN/1.73M2
GLUCOSE BLD-MCNC: 127 MG/DL (ref 70–99)
HBA1C MFR BLD: 6.6 % (ref 0–5.6)
HDLC SERPL-MCNC: 45 MG/DL
LDLC SERPL CALC-MCNC: 81 MG/DL
MICROALBUMIN UR-MCNC: 35 MG/L
MICROALBUMIN/CREAT UR: 28.23 MG/G CR (ref 0–17)
NONHDLC SERPL-MCNC: 109 MG/DL
POTASSIUM BLD-SCNC: 4.1 MMOL/L (ref 3.4–5.3)
SODIUM SERPL-SCNC: 140 MMOL/L (ref 133–144)
TRIGL SERPL-MCNC: 142 MG/DL

## 2021-09-30 PROCEDURE — 82043 UR ALBUMIN QUANTITATIVE: CPT | Performed by: FAMILY MEDICINE

## 2021-09-30 PROCEDURE — 80048 BASIC METABOLIC PNL TOTAL CA: CPT | Performed by: FAMILY MEDICINE

## 2021-09-30 PROCEDURE — 83036 HEMOGLOBIN GLYCOSYLATED A1C: CPT | Performed by: FAMILY MEDICINE

## 2021-09-30 PROCEDURE — 80061 LIPID PANEL: CPT | Performed by: FAMILY MEDICINE

## 2021-09-30 PROCEDURE — 99214 OFFICE O/P EST MOD 30 MIN: CPT | Performed by: FAMILY MEDICINE

## 2021-09-30 PROCEDURE — 36415 COLL VENOUS BLD VENIPUNCTURE: CPT | Performed by: FAMILY MEDICINE

## 2021-09-30 RX ORDER — METOPROLOL TARTRATE 100 MG
100 TABLET ORAL 2 TIMES DAILY
Qty: 180 TABLET | Refills: 3 | Status: SHIPPED | OUTPATIENT
Start: 2021-09-30 | End: 2022-12-13

## 2021-09-30 RX ORDER — GLYBURIDE-METFORMIN HYDROCHLORIDE 5; 500 MG/1; MG/1
TABLET ORAL
Qty: 180 TABLET | Refills: 3 | Status: SHIPPED | OUTPATIENT
Start: 2021-09-30 | End: 2022-12-13

## 2021-09-30 RX ORDER — AMLODIPINE BESYLATE 10 MG/1
10 TABLET ORAL DAILY
Qty: 90 TABLET | Refills: 3 | Status: SHIPPED | OUTPATIENT
Start: 2021-09-30 | End: 2022-12-13

## 2021-09-30 RX ORDER — PAROXETINE 10 MG/1
TABLET, FILM COATED ORAL
Qty: 90 TABLET | Refills: 3 | Status: SHIPPED | OUTPATIENT
Start: 2021-09-30 | End: 2022-10-03

## 2021-09-30 RX ORDER — LISINOPRIL 10 MG/1
10 TABLET ORAL DAILY
Qty: 90 TABLET | Refills: 3 | Status: SHIPPED | OUTPATIENT
Start: 2021-09-30 | End: 2022-11-14

## 2021-09-30 RX ORDER — LOVASTATIN 40 MG
TABLET ORAL
Qty: 135 TABLET | Refills: 3 | Status: SHIPPED | OUTPATIENT
Start: 2021-09-30 | End: 2022-11-14

## 2021-09-30 RX ORDER — PAROXETINE 20 MG/1
TABLET, FILM COATED ORAL
Qty: 90 TABLET | Refills: 3 | Status: SHIPPED | OUTPATIENT
Start: 2021-09-30 | End: 2022-10-03

## 2021-09-30 ASSESSMENT — PAIN SCALES - GENERAL: PAINLEVEL: NO PAIN (0)

## 2021-09-30 ASSESSMENT — MIFFLIN-ST. JEOR: SCORE: 1834.22

## 2021-09-30 NOTE — LETTER

## 2021-09-30 NOTE — PROGRESS NOTES
"Assessment & Plan         Encounter Diagnoses   Name Primary?     Major depressive disorder, recurrent episode, mild (H)      Hypertension goal BP (blood pressure) < 140/90      Type 2 diabetes mellitus without complication, without long-term current use of insulin (H)      Hyperlipidemia LDL goal <100      Essential hypertension with goal blood pressure less than 140/90      Colon cancer screening Yes     Encounter for screening for lung cancer      Personal history of nicotine dependence       Patient is taking his medications for his diabetes and his blood pressure and his lipid problem but he is starting to become depressed again.  He was on meds for depression after his wife passed and now is starting to slide again wants to get back on his medications think she may need to be on a little stronger dose this time he felt good last time but thinks he could improve that.  He is not suicidal.  I did answer his questions his satisfaction.  We will start him back on his Paxil started 20 mg at at bedtime increase to 30 mg at at bedtime in 1 to 2 weeks.  I will see him back in 4 weeks time for recheck.  We will get some screening laboratory studies refill his medications for him.  Inform him if we need to make any changes in his medications.        Thomas Bosch MD, MD  [facility] ELIGIO Barker is a 71 year old male who presents to clinic today for the following health issues     HPI       Diabetes      Review of Systems   Constitutional, HEENT, cardiovascular, pulmonary, gi and gu systems are negative, except as otherwise noted.      Objective    /78   Pulse 78   Temp 97.4  F (36.3  C) (Temporal)   Resp 18   Ht 1.826 m (5' 11.9\")   Wt 104.8 kg (231 lb)   SpO2 99%   BMI 31.42 kg/m       Physical Exam   GENERAL: healthy, alert and no distress  HENT: ear canals and TM's normal, nose and mouth without ulcers or lesions  NECK: no adenopathy, no asymmetry, masses, or scars " and thyroid normal to palpation  RESP: lungs clear to auscultation - no rales, rhonchi or wheezes  CV: regular rate and rhythm, normal S1 S2, no S3 or S4, no murmur, click or rub, no peripheral edema and peripheral pulses strong  ABDOMEN: soft, nontender, no hepatosplenomegaly, no masses and bowel sounds normal  MS: no gross musculoskeletal defects noted, no edema  Diabetic foot exam: normal DP and PT pulses, no trophic changes or ulcerative lesions and normal sensory exam          Results for orders placed or performed in visit on 09/30/21   Lipid panel reflex to direct LDL Fasting     Status: None   Result Value Ref Range    Cholesterol 154 <200 mg/dL    Triglycerides 142 <150 mg/dL    Direct Measure HDL 45 >=40 mg/dL    LDL Cholesterol Calculated 81 <=100 mg/dL    Non HDL Cholesterol 109 <130 mg/dL    Patient Fasting > 8hrs? Yes     Narrative    Cholesterol  Desirable:  <200 mg/dL    Triglycerides  Normal:  Less than 150 mg/dL  Borderline High:  150-199 mg/dL  High:  200-499 mg/dL  Very High:  Greater than or equal to 500 mg/dL    Direct Measure HDL  Female:  Greater than or equal to 50 mg/dL   Male:  Greater than or equal to 40 mg/dL    LDL Cholesterol  Desirable:  <100mg/dL  Above Desirable:  100-129 mg/dL   Borderline High:  130-159 mg/dL   High:  160-189 mg/dL   Very High:  >= 190 mg/dL    Non HDL Cholesterol  Desirable:  130 mg/dL  Above Desirable:  130-159 mg/dL  Borderline High:  160-189 mg/dL  High:  190-219 mg/dL  Very High:  Greater than or equal to 220 mg/dL   Basic metabolic panel  (Ca, Cl, CO2, Creat, Gluc, K, Na, BUN)     Status: Abnormal   Result Value Ref Range    Sodium 140 133 - 144 mmol/L    Potassium 4.1 3.4 - 5.3 mmol/L    Chloride 108 94 - 109 mmol/L    Carbon Dioxide (CO2) 28 20 - 32 mmol/L    Anion Gap 4 3 - 14 mmol/L    Urea Nitrogen 14 7 - 30 mg/dL    Creatinine 1.15 0.66 - 1.25 mg/dL    Calcium 8.9 8.5 - 10.1 mg/dL    Glucose 127 (H) 70 - 99 mg/dL    GFR Estimate 63 >60 mL/min/1.73m2    **A1C FUTURE 3mo     Status: Abnormal   Result Value Ref Range    Hemoglobin A1C 6.6 (H) 0.0 - 5.6 %   Albumin Random Urine Quantitative with Creat Ratio     Status: Abnormal   Result Value Ref Range    Creatinine Urine mg/dL 124 mg/dL    Albumin Urine mg/L 35 mg/L    Albumin Urine mg/g Cr 28.23 (H) 0.00 - 17.00 mg/g Cr

## 2021-09-30 NOTE — TELEPHONE ENCOUNTER
Date of procedure: 11/29  Colonoscopy  Surgeon: Dr. Chandra  Prep:Miralax  Packet:Colonoscopy/EGD instructions mailed to patient's home address.   Date: 9/30/2021      Surgery Scheduler

## 2021-10-01 ENCOUNTER — TELEPHONE (OUTPATIENT)
Dept: FAMILY MEDICINE | Facility: CLINIC | Age: 71
End: 2021-10-01

## 2021-10-01 NOTE — LETTER
October 1, 2021      Mj Ruth  [address] [facility] [address]        Dear ,    We are writing to inform you of your test results.    Your labs are all appropriate.  I do not see anything that is concerning; no change in your medications.    Resulted Orders   Lipid panel reflex to direct LDL Fasting   Result Value Ref Range    Cholesterol 154 <200 mg/dL    Triglycerides 142 <150 mg/dL    Direct Measure HDL 45 >=40 mg/dL    LDL Cholesterol Calculated 81 <=100 mg/dL    Non HDL Cholesterol 109 <130 mg/dL    Patient Fasting > 8hrs? Yes     Narrative    Cholesterol  Desirable:  <200 mg/dL    Triglycerides  Normal:  Less than 150 mg/dL  Borderline High:  150-199 mg/dL  High:  200-499 mg/dL  Very High:  Greater than or equal to 500 mg/dL    Direct Measure HDL  Female:  Greater than or equal to 50 mg/dL   Male:  Greater than or equal to 40 mg/dL    LDL Cholesterol  Desirable:  <100mg/dL  Above Desirable:  100-129 mg/dL   Borderline High:  130-159 mg/dL   High:  160-189 mg/dL   Very High:  >= 190 mg/dL    Non HDL Cholesterol  Desirable:  130 mg/dL  Above Desirable:  130-159 mg/dL  Borderline High:  160-189 mg/dL  High:  190-219 mg/dL  Very High:  Greater than or equal to 220 mg/dL   Basic metabolic panel  (Ca, Cl, CO2, Creat, Gluc, K, Na, BUN)   Result Value Ref Range    Sodium 140 133 - 144 mmol/L    Potassium 4.1 3.4 - 5.3 mmol/L    Chloride 108 94 - 109 mmol/L    Carbon Dioxide (CO2) 28 20 - 32 mmol/L    Anion Gap 4 3 - 14 mmol/L    Urea Nitrogen 14 7 - 30 mg/dL    Creatinine 1.15 0.66 - 1.25 mg/dL    Calcium 8.9 8.5 - 10.1 mg/dL    Glucose 127 (H) 70 - 99 mg/dL    GFR Estimate 63 >60 mL/min/1.73m2      Comment:      As of July 11, 2021, eGFR is calculated by the CKD-EPI creatinine equation, without race adjustment. eGFR can be influenced by muscle mass, exercise, and diet. The reported eGFR is an estimation only and is only applicable if the renal function is stable.   **A1C FUTURE 3mo    Result Value Ref Range    Hemoglobin A1C 6.6 (H) 0.0 - 5.6 %      Comment:      Normal <5.7%   Prediabetes 5.7-6.4%    Diabetes 6.5% or higher     Note: Adopted from ADA consensus guidelines.   Albumin Random Urine Quantitative with Creat Ratio   Result Value Ref Range    Creatinine Urine mg/dL 124 mg/dL    Albumin Urine mg/L 35 mg/L    Albumin Urine mg/g Cr 28.23 (H) 0.00 - 17.00 mg/g Cr       If you have any questions or concerns, please call the clinic at the number listed above.       Sincerely,      Dr. Bosch

## 2021-10-01 NOTE — TELEPHONE ENCOUNTER
----- Message from Thomas Bosch MD sent at 9/30/2021 12:42 PM CDT -----  Please send the patient a letter his labs all appropriate.  I do not see anything that is concerning no change in his medications.

## 2021-10-12 ENCOUNTER — HOSPITAL ENCOUNTER (OUTPATIENT)
Dept: CT IMAGING | Facility: CLINIC | Age: 71
Discharge: HOME | End: 2021-10-12
Attending: FAMILY MEDICINE | Admitting: FAMILY MEDICINE
Payer: MEDICARE

## 2021-10-12 DIAGNOSIS — Z12.2 ENCOUNTER FOR SCREENING FOR LUNG CANCER: ICD-10-CM

## 2021-10-12 DIAGNOSIS — Z87.891 PERSONAL HISTORY OF NICOTINE DEPENDENCE: ICD-10-CM

## 2021-10-12 PROCEDURE — 71271 CT THORAX LUNG CANCER SCR C-: CPT

## 2021-10-15 ENCOUNTER — TRANSFERRED RECORDS (OUTPATIENT)
Dept: HEALTH INFORMATION MANAGEMENT | Facility: CLINIC | Age: 71
End: 2021-10-15

## 2021-10-15 LAB — RETINOPATHY: NEGATIVE

## 2021-10-25 DIAGNOSIS — Z11.59 ENCOUNTER FOR SCREENING FOR OTHER VIRAL DISEASES: ICD-10-CM

## 2021-10-28 ENCOUNTER — OFFICE VISIT (OUTPATIENT)
Dept: FAMILY MEDICINE | Facility: CLINIC | Age: 71
End: 2021-10-28
Payer: COMMERCIAL

## 2021-10-28 VITALS
TEMPERATURE: 97.3 F | WEIGHT: 230 LBS | RESPIRATION RATE: 18 BRPM | SYSTOLIC BLOOD PRESSURE: 130 MMHG | OXYGEN SATURATION: 96 % | BODY MASS INDEX: 31.28 KG/M2 | HEART RATE: 71 BPM | DIASTOLIC BLOOD PRESSURE: 80 MMHG

## 2021-10-28 DIAGNOSIS — G47.9 SLEEP DISORDER: Primary | ICD-10-CM

## 2021-10-28 DIAGNOSIS — F33.0 MAJOR DEPRESSIVE DISORDER, RECURRENT EPISODE, MILD (H): ICD-10-CM

## 2021-10-28 DIAGNOSIS — J41.0 SIMPLE CHRONIC BRONCHITIS (H): ICD-10-CM

## 2021-10-28 PROCEDURE — 99214 OFFICE O/P EST MOD 30 MIN: CPT | Performed by: FAMILY MEDICINE

## 2021-10-28 RX ORDER — AMITRIPTYLINE HYDROCHLORIDE 10 MG/1
10 TABLET ORAL AT BEDTIME
Qty: 60 TABLET | Refills: 1 | Status: SHIPPED | OUTPATIENT
Start: 2021-10-28 | End: 2022-02-24

## 2021-10-28 ASSESSMENT — PAIN SCALES - GENERAL: PAINLEVEL: NO PAIN (0)

## 2021-10-28 NOTE — PROGRESS NOTES
Encounter Diagnoses   Name Primary?     Sleep disorder Yes     Major depressive disorder, recurrent episode, mild (H)      Chronic Bronchitis:     Plan: We will continue the patient on his 30 mg of his SSRI.  He is having some trouble with sleeping.  Always wakes up from two and three I did talk to him about trying some Elavil 10 mg a day chest taken half hour before he tries to go to sleep.  He can try this for a week increase to 20 after another week and use 30 if needed after a week of 20s.  His chronic bronchitis has been stable on his DuoNeb and since he quit smoking quite a few years ago.  Had no significant progression of his problems.  He also has a diagnosis of COPD.  The patient is in a MyChart me in the next month to let me know how his new sleep med is working.  He has any concerns prior to that time he will contact me I will give him a phone call.    Anayeli Barker is a 71 year old who presents for the following health issues     HPI     Depression Followup    How are you doing with your depression since your last visit? Improved yes     Are you having other symptoms that might be associated with depression? No    Have you had a significant life event?  No     Are you feeling anxious or having panic attacks?   No    Do you have any concerns with your use of alcohol or other drugs? No    Social History     Tobacco Use     Smoking status: Former Smoker     Packs/day: 1.00     Years: 35.00     Pack years: 35.00     Types: Cigarettes     Quit date: 1/4/2011     Years since quitting: 10.8     Smokeless tobacco: Never Used   Substance Use Topics     Alcohol use: Yes     Comment: once monthly or less     Drug use: No     PHQ 4/9/2019 5/7/2019 4/28/2020   PHQ-9 Total Score 18 12 15   Q9: Thoughts of better off dead/self-harm past 2 weeks Several days Several days Not at all   F/U: Thoughts of suicide or self-harm No - -   F/U: Safety concerns No - -     No flowsheet data found.  Last PHQ-9 4/28/2020   1.   Little interest or pleasure in doing things 3   2.  Feeling down, depressed, or hopeless 2   3.  Trouble falling or staying asleep, or sleeping too much 3   4.  Feeling tired or having little energy 3   5.  Poor appetite or overeating 3   6.  Feeling bad about yourself 0   7.  Trouble concentrating 1   8.  Moving slowly or restless 0   Q9: Thoughts of better off dead/self-harm past 2 weeks 0   PHQ-9 Total Score 15   Difficulty at work, home, or with people Somewhat difficult   In the past two weeks have you had thoughts of suicide or self harm? -   Do you have concerns about your personal safety or the safety of others? -     No flowsheet data found.    Suicide Assessment Five-step Evaluation and Treatment (SAFE-T)        Review of Systems   Sleeping problems      Objective    /80   Pulse 71   Temp 97.3  F (36.3  C) (Temporal)   Resp 18   Wt 104.3 kg (230 lb)   SpO2 96%   BMI 31.28 kg/m    Body mass index is 31.28 kg/m .  Physical Exam   GENERAL: healthy, alert and no distress

## 2021-11-26 ENCOUNTER — LAB (OUTPATIENT)
Dept: LAB | Facility: CLINIC | Age: 71
End: 2021-11-26
Payer: COMMERCIAL

## 2021-11-26 DIAGNOSIS — Z11.59 ENCOUNTER FOR SCREENING FOR OTHER VIRAL DISEASES: ICD-10-CM

## 2021-11-26 PROCEDURE — U0005 INFEC AGEN DETEC AMPLI PROBE: HCPCS

## 2021-11-26 PROCEDURE — U0003 INFECTIOUS AGENT DETECTION BY NUCLEIC ACID (DNA OR RNA); SEVERE ACUTE RESPIRATORY SYNDROME CORONAVIRUS 2 (SARS-COV-2) (CORONAVIRUS DISEASE [COVID-19]), AMPLIFIED PROBE TECHNIQUE, MAKING USE OF HIGH THROUGHPUT TECHNOLOGIES AS DESCRIBED BY CMS-2020-01-R: HCPCS

## 2021-11-27 ENCOUNTER — ANESTHESIA EVENT (OUTPATIENT)
Dept: GASTROENTEROLOGY | Facility: CLINIC | Age: 71
End: 2021-11-27
Payer: MEDICARE

## 2021-11-27 LAB — SARS-COV-2 RNA RESP QL NAA+PROBE: NEGATIVE

## 2021-11-27 ASSESSMENT — LIFESTYLE VARIABLES: TOBACCO_USE: 1

## 2021-11-27 ASSESSMENT — COPD QUESTIONNAIRES: COPD: 1
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
## 2021-11-29 ENCOUNTER — ANESTHESIA (OUTPATIENT)
Dept: GASTROENTEROLOGY | Facility: CLINIC | Age: 71
End: 2021-11-29
Payer: MEDICARE

## 2021-11-29 ENCOUNTER — HOSPITAL ENCOUNTER (OUTPATIENT)
Facility: CLINIC | Age: 71
Discharge: HOME | End: 2021-11-29
Attending: FAMILY MEDICINE | Admitting: FAMILY MEDICINE
Payer: MEDICARE

## 2021-11-29 VITALS — OXYGEN SATURATION: 93 % | DIASTOLIC BLOOD PRESSURE: 72 MMHG | HEART RATE: 67 BPM | SYSTOLIC BLOOD PRESSURE: 127 MMHG

## 2021-11-29 LAB
COLONOSCOPY: NORMAL
GLUCOSE BLDC GLUCOMTR-MCNC: 117 MG/DL (ref 70–99)

## 2021-11-29 PROCEDURE — 258N000003 HC RX IP 258 OP 636: Performed by: NURSE ANESTHETIST, CERTIFIED REGISTERED

## 2021-11-29 PROCEDURE — 45380 COLONOSCOPY AND BIOPSY: CPT | Performed by: FAMILY MEDICINE

## 2021-11-29 PROCEDURE — 370N000017 HC ANESTHESIA TECHNICAL FEE, PER MIN: Performed by: FAMILY MEDICINE

## 2021-11-29 PROCEDURE — 250N000011 HC RX IP 250 OP 636: Performed by: NURSE ANESTHETIST, CERTIFIED REGISTERED

## 2021-11-29 PROCEDURE — 250N000009 HC RX 250: Performed by: NURSE ANESTHETIST, CERTIFIED REGISTERED

## 2021-11-29 PROCEDURE — 82962 GLUCOSE BLOOD TEST: CPT

## 2021-11-29 PROCEDURE — 88305 TISSUE EXAM BY PATHOLOGIST: CPT | Mod: TC | Performed by: FAMILY MEDICINE

## 2021-11-29 PROCEDURE — 45385 COLONOSCOPY W/LESION REMOVAL: CPT | Mod: PT | Performed by: FAMILY MEDICINE

## 2021-11-29 RX ORDER — LIDOCAINE 40 MG/G
CREAM TOPICAL
Status: DISCONTINUED | OUTPATIENT
Start: 2021-11-29 | End: 2021-11-29 | Stop reason: HOSPADM

## 2021-11-29 RX ORDER — SODIUM CHLORIDE, SODIUM LACTATE, POTASSIUM CHLORIDE, CALCIUM CHLORIDE 600; 310; 30; 20 MG/100ML; MG/100ML; MG/100ML; MG/100ML
INJECTION, SOLUTION INTRAVENOUS CONTINUOUS
Status: DISCONTINUED | OUTPATIENT
Start: 2021-11-29 | End: 2021-11-29 | Stop reason: HOSPADM

## 2021-11-29 RX ORDER — PROPOFOL 10 MG/ML
INJECTION, EMULSION INTRAVENOUS CONTINUOUS PRN
Status: DISCONTINUED | OUTPATIENT
Start: 2021-11-29 | End: 2021-11-29

## 2021-11-29 RX ORDER — PROPOFOL 10 MG/ML
INJECTION, EMULSION INTRAVENOUS PRN
Status: DISCONTINUED | OUTPATIENT
Start: 2021-11-29 | End: 2021-11-29

## 2021-11-29 RX ORDER — LIDOCAINE HYDROCHLORIDE 20 MG/ML
INJECTION, SOLUTION INFILTRATION; PERINEURAL PRN
Status: DISCONTINUED | OUTPATIENT
Start: 2021-11-29 | End: 2021-11-29

## 2021-11-29 RX ADMIN — LIDOCAINE HYDROCHLORIDE 80 MG: 20 INJECTION, SOLUTION INFILTRATION; PERINEURAL at 11:11

## 2021-11-29 RX ADMIN — PROPOFOL 150 MCG/KG/MIN: 10 INJECTION, EMULSION INTRAVENOUS at 11:11

## 2021-11-29 RX ADMIN — SODIUM CHLORIDE, POTASSIUM CHLORIDE, SODIUM LACTATE AND CALCIUM CHLORIDE: 600; 310; 30; 20 INJECTION, SOLUTION INTRAVENOUS at 11:06

## 2021-11-29 RX ADMIN — PROPOFOL 100 MG: 10 INJECTION, EMULSION INTRAVENOUS at 11:11

## 2021-11-29 NOTE — LETTER
December 1, 2021      Mj Ruth  [address]  [facility] [address]        Dear ,    We are writing to inform you of your test results.     Here is your pathology results.  No sign of cancer.       Tom Chandra MD       Resulted Orders   Surgical Pathology Exam   Result Value Ref Range    Case Report       Surgical Pathology Report                         Case: [id number]                                  Authorizing Provider:  Tom Chandra MD  Collected:           11/29/2021 11:21 AM          Ordering Location:     [facility]          Received:            11/29/2021 11:45 AM                                 [facility]                                                          Pathologist:           Giovanni Bullard MD                                                           Specimen:    Large Intestine, Colon, Ascending, Ascending polyp                                         Final Diagnosis       Colon, ascending: Polypectomy:  - Tubular adenoma  - No evidence of high-grade dysplasia or invasive malignancy        Clinical Information       Personal history of colonic polyps      Gross Description       A(1). Large Intestine, Colon, Ascending, Ascending polyp:  The specimen is received in formalin, labeled with the patient's name, medical record number and other identifying information and designated  ascending colon polyp . It consists of a single tan soft tissue fragment measuring 0.6 cm in greatest dimension. Entirely submitted in one cassette.  (BETTY Hubbard [facility] CM)        Microscopic Description       The microscopic findings substantiates the final diagnosis.       Performing Labs       The technical component of this testing was completed at [facility]      Case Images         If you have any questions or concerns, please call the clinic at the number listed above.

## 2021-11-29 NOTE — ANESTHESIA POSTPROCEDURE EVALUATION
Patient: Mj Ruth    Procedure: Procedure(s):  COLONOSCOPY, WITH POLYPECTOMY by snare       Diagnosis:Colon cancer screening [Z12.11]  Diagnosis Additional Information: No value filed.    Anesthesia Type:  MAC    Note:  Disposition: Outpatient   Postop Pain Control: Uneventful            Sign Out: Well controlled pain   PONV: No   Neuro/Psych: Uneventful            Sign Out: Acceptable/Baseline neuro status   Airway/Respiratory: Uneventful            Sign Out: Acceptable/Baseline resp. status   CV/Hemodynamics: Uneventful            Sign Out: Acceptable CV status   Other NRE: NONE   DID A NON-ROUTINE EVENT OCCUR? No    Event details/Postop Comments:  Pt was happy with anesthesia care.  No complications.  I will follow up with the pt if needed.           Last vitals:  Vitals Value Taken Time   BP     Temp     Pulse     Resp     SpO2 94 % 11/29/21 1140   Vitals shown include unvalidated device data.    Electronically Signed By: LEONIE Farfan CRNA  November 29, 2021  11:41 AM

## 2021-11-29 NOTE — ANESTHESIA CARE TRANSFER NOTE
Patient: Mj Ruth    Procedure: Procedure(s):  COLONOSCOPY, WITH POLYPECTOMY by snare       Diagnosis: Colon cancer screening [Z12.11]  Diagnosis Additional Information: No value filed.    Anesthesia Type:   MAC     Note:    Oropharynx: spontaneously breathing  Level of Consciousness: awake  Oxygen Supplementation: room air    Independent Airway: airway patency satisfactory and stable  Dentition: dentition unchanged  Vital Signs Stable: post-procedure vital signs reviewed and stable  Report to RN Given: handoff report given  Patient transferred to: Phase II    Handoff Report: Identifed the Patient, Identified the Reponsible Provider, Reviewed the pertinent medical history, Discussed the surgical course, Reviewed Intra-OP anesthesia mangement and issues during anesthesia, Set expectations for post-procedure period and Allowed opportunity for questions and acknowledgement of understanding      Vitals:  Vitals Value Taken Time   BP     Temp     Pulse     Resp     SpO2 94 % 11/29/21 1140   Vitals shown include unvalidated device data.    Electronically Signed By: LEONIE Farfan CRNA  November 29, 2021  11:41 AM

## 2021-11-29 NOTE — DISCHARGE INSTRUCTIONS
[facility]    Home Care Following Endoscopy          Activity:    You have just undergone an endoscopic procedure usually performed with conscious sedation.  Do not work or operate machinery (including a car) for at least 12 hours.      I encourage you to walk and attempt to pass this air as soon as possible.    Diet:    Return to the diet you were on before your procedure but eat lightly for the first 12-24 hours.    Drink plenty of water.    Resume any regular medications unless otherwise advised by your physician.  Please begin any new medication prescribed as a result of your procedure as directed by your physician.     If you had any biopsy or polyp removed please refrain from aspirin or aspirin products for 2 days.  If on Coumadin please restart as instructed by your physician.   Pain:    You may take Tylenol as needed for pain.  Expected Recovery:    You can expect some mild abdominal fullness and/or discomfort due to the air used to inflate your intestinal tract.    Call Your Physician if You Have:      After Colonoscopy:  o Worsening persisting abdominal pain which is worse with activity.  o Fevers (>101 degrees F), chills or shakes.  o Passage of continued blood with bowel movements.   Any questions or concerns about your recovery, please call [phone] or after hours [phone] Nurse Advice Line.    Follow-up Care:    You should receive a call or letter with your results within 1 week. Please call if you have not received a notification of your results.  If asked to return to clinic please make an appointment 1 week after your procedure.  Call [phone].

## 2021-11-29 NOTE — H&P
"Pre-Endoscopy History and Physical     Mj Ruth MRN# [id number]   YOB: 1950 Age: 71 year old     Date of Procedure: 11/29/2021  Primary care provider: Thomas Bosch  Type of Endoscopy: colonoscopy  Type of Anesthesia Anticipated: MAC     HPI:    Mj is a 71 year old male who was referred to me for colonoscopy.    Mj is feeling well today.      Patient Active Problem List   Diagnosis     Erectile dysfunction     Health Care Home     Advanced directives, counseling/discussion     Hyperlipidemia LDL goal <100     Diabetes mellitus, type 2 (H)     Essential hypertension with goal blood pressure less than 140/90     Chronic obstructive pulmonary disease, unspecified COPD type (H)     Simple chronic bronchitis (H)     Benign neoplasm of colon, unspecified part of colon     Mild recurrent major depression (H)            PHYSICAL EXAM:   There were no vitals taken for this visit.   Estimated body mass index is 31.28 kg/m  as calculated from the following:    Height as of 9/30/21: 1.826 m (5' 11.9\").    Weight as of 10/28/21: 104.3 kg (230 lb).    GENERAL APPEARANCE: alert and no distress. Appears to comprehend the procedure and indication  RESP: lungs unlabored  CV: regular  ABD: soft, nt/nd    DIAGNOSTICS:      COVID-19 PCR Results    COVID-19 PCR Results 11/26/21   SARS CoV2 PCR Negative      Comments are available for some flowsheets but are not being displayed.         COVID-19 Antibody Results, Testing for Immunity    COVID-19 Antibody Results, Testing for Immunity   No data to display.              IMPRESSION   ASA Class 2 - Mild systemic disease        PLAN:     Plan for colonoscopy. No medical contraindications to proceed, or further work up needed. The risks and benefits of the procedure and the sedation options and risks were discussed with the patient. These include infection, bleeding, and small risk of colon perforation (1/1000 to 1/25685 depending on patient characteristics " and type of procedure). Mj was also explained to alternatives for colo-rectal screening. All questions were answered and informed consent was obtained.      The above has been forwarded to the consulting provider.      Signed Electronically by: Tom Chandra MD  November 29, 2021

## 2021-12-01 LAB
PATH REPORT.COMMENTS IMP SPEC: NORMAL
PATH REPORT.COMMENTS IMP SPEC: NORMAL
PATH REPORT.FINAL DX SPEC: NORMAL
PATH REPORT.GROSS SPEC: NORMAL
PATH REPORT.MICROSCOPIC SPEC OTHER STN: NORMAL
PATH REPORT.RELEVANT HX SPEC: NORMAL
PHOTO IMAGE: NORMAL

## 2021-12-01 PROCEDURE — 88305 TISSUE EXAM BY PATHOLOGIST: CPT | Mod: 26 | Performed by: PATHOLOGY

## 2022-02-24 DIAGNOSIS — G47.9 SLEEP DISORDER: ICD-10-CM

## 2022-02-24 RX ORDER — AMITRIPTYLINE HYDROCHLORIDE 10 MG/1
TABLET ORAL
Qty: 60 TABLET | Refills: 1 | Status: SHIPPED | OUTPATIENT
Start: 2022-02-24 | End: 2022-07-01

## 2022-02-24 NOTE — TELEPHONE ENCOUNTER
Routing refill request to provider for review/approval because:  Drug interaction warning    Yuliet Donohue RN

## 2022-04-01 ENCOUNTER — TRANSFERRED RECORDS (OUTPATIENT)
Dept: MULTI SPECIALTY CLINIC | Facility: CLINIC | Age: 72
End: 2022-04-01

## 2022-04-01 LAB — RETINOPATHY: NORMAL

## 2022-06-28 DIAGNOSIS — G47.9 SLEEP DISORDER: ICD-10-CM

## 2022-07-01 RX ORDER — AMITRIPTYLINE HYDROCHLORIDE 10 MG/1
TABLET ORAL
Qty: 60 TABLET | Refills: 1 | Status: SHIPPED | OUTPATIENT
Start: 2022-07-01 | End: 2022-11-11

## 2022-07-01 NOTE — TELEPHONE ENCOUNTER
Pending Prescriptions:                       Disp   Refills    amitriptyline (ELAVIL) 10 MG tablet [Pharm*60 tab*1        Sig: TAKE 1 TABLET BY MOUTH AT BEDTIME    Routing refill request to provider for review/approval because:  Drug interaction warning    Rubi Saleh RN

## 2022-09-29 DIAGNOSIS — F33.0 MAJOR DEPRESSIVE DISORDER, RECURRENT EPISODE, MILD (H): ICD-10-CM

## 2022-10-03 RX ORDER — PAROXETINE 20 MG/1
TABLET, FILM COATED ORAL
Qty: 90 TABLET | Refills: 0 | Status: SHIPPED | OUTPATIENT
Start: 2022-10-03 | End: 2022-11-14

## 2022-10-03 RX ORDER — PAROXETINE 10 MG/1
TABLET, FILM COATED ORAL
Qty: 90 TABLET | Refills: 0 | Status: SHIPPED | OUTPATIENT
Start: 2022-10-03 | End: 2023-02-12

## 2022-10-03 NOTE — TELEPHONE ENCOUNTER
Routing refill request to provider for review/approval because:  Patient needs to be seen because:  6 month visit  PHQ-9    Damaso Hooks RN

## 2022-11-10 DIAGNOSIS — G47.9 SLEEP DISORDER: ICD-10-CM

## 2022-11-11 RX ORDER — AMITRIPTYLINE HYDROCHLORIDE 10 MG/1
TABLET ORAL
Qty: 60 TABLET | Refills: 0 | Status: SHIPPED | OUTPATIENT
Start: 2022-11-11 | End: 2022-12-07

## 2022-11-11 NOTE — TELEPHONE ENCOUNTER
"Requested Prescriptions   Pending Prescriptions Disp Refills    amitriptyline (ELAVIL) 10 MG tablet [Pharmacy Med Name: Amitriptyline HCl 10 MG Oral Tablet] 60 tablet 0     Sig: TAKE 1 TABLET BY MOUTH AT BEDTIME       Tricyclic Agents ( Annual appt and no PHQ9) Failed - 11/10/2022  1:30 PM        Failed - Recent (12 mo) or future (30 days) visit within authorizing provider's specialty     Patient has had an office visit with the authorizing provider or a provider within the authorizing providers department within the previous 12 mos or has a future within next 30 days. See \"Patient Info\" tab in inbasket, or \"Choose Columns\" in Meds & Orders section of the refill encounter.              Passed - Blood Pressure under 140/90 in past 12 mos     BP Readings from Last 3 Encounters:   11/29/21 127/72   10/28/21 130/80   09/30/21 120/78                 Passed - Medication is active on med list        Passed - Patient is age 18 or older              Africa Tarango RN  "

## 2022-12-07 DIAGNOSIS — I10 HYPERTENSION GOAL BP (BLOOD PRESSURE) < 140/90: ICD-10-CM

## 2022-12-07 DIAGNOSIS — E11.9 TYPE 2 DIABETES MELLITUS WITHOUT COMPLICATION, WITHOUT LONG-TERM CURRENT USE OF INSULIN (H): ICD-10-CM

## 2022-12-07 DIAGNOSIS — G47.9 SLEEP DISORDER: ICD-10-CM

## 2022-12-07 DIAGNOSIS — E78.5 HYPERLIPIDEMIA LDL GOAL <100: ICD-10-CM

## 2022-12-07 DIAGNOSIS — I10 ESSENTIAL HYPERTENSION WITH GOAL BLOOD PRESSURE LESS THAN 140/90: ICD-10-CM

## 2022-12-10 NOTE — TELEPHONE ENCOUNTER
"Requested Prescriptions   Pending Prescriptions Disp Refills    amitriptyline (ELAVIL) 10 MG tablet 60 tablet 0     Sig: Take 1 tablet (10 mg) by mouth At Bedtime       Tricyclic Agents ( Annual appt and no PHQ9) Failed - 12/7/2022  4:37 PM        Failed - Blood Pressure under 140/90 in past 12 mos     BP Readings from Last 3 Encounters:   11/29/21 127/72   10/28/21 130/80   09/30/21 120/78                 Failed - Recent (12 mo) or future (30 days) visit within authorizing provider's specialty     Patient has had an office visit with the authorizing provider or a provider within the authorizing providers department within the previous 12 mos or has a future within next 30 days. See \"Patient Info\" tab in inbasket, or \"Choose Columns\" in Meds & Orders section of the refill encounter.              Passed - Medication is active on med list        Passed - Patient is age 18 or older          amLODIPine (NORVASC) 10 MG tablet 90 tablet 3     Sig: Take 1 tablet (10 mg) by mouth daily       Calcium Channel Blockers Protocol  Failed - 12/7/2022  4:37 PM        Failed - Blood pressure under 140/90 in past 12 months     BP Readings from Last 3 Encounters:   11/29/21 127/72   10/28/21 130/80   09/30/21 120/78                 Failed - Recent (12 mo) or future (30 days) visit within the authorizing provider's specialty     Patient has had an office visit with the authorizing provider or a provider within the authorizing providers department within the previous 12 mos or has a future within next 30 days. See \"Patient Info\" tab in inbasket, or \"Choose Columns\" in Meds & Orders section of the refill encounter.              Failed - Normal serum creatinine on file in past 12 months     Recent Labs   Lab Test 09/30/21  1050   CR 1.15       Ok to refill medication if creatinine is low          Passed - Medication is active on med list        Passed - Patient is age 18 or older          glyBURIDE-metFORMIN (GLUCOVANCE) 5-500 MG tablet " "180 tablet 3     Sig: TAKE 1 TABLET BY MOUTH TWICE DAILY WITH MEALS       Combination Oral Antihyperglycemic Agents Failed - 12/7/2022  4:37 PM        Failed - Patient has documented A1c within the specified period of time.     If HgbA1C is 8 or greater, it needs to be on file within the past 3 months.  If less than 8, must be on file within the past 6 months.     Recent Labs   Lab Test 09/30/21  1050   A1C 6.6*             Failed - Patient's CR is NOT>1.4 OR Patient's EGFR is NOT<45 within past 12 mos.     Recent Labs   Lab Test 09/30/21  1050 05/06/20  0907   GFRESTIMATED 63 66   GFRESTBLACK  --  76       Recent Labs   Lab Test 09/30/21  1050   CR 1.15             Failed - Recent (6 mo) or future (30 days) visit within the authorizing provider's specialty     Patient had office visit in the last 6 months or has a visit in the next 30 days with authorizing provider or within the authorizing provider's specialty.  See \"Patient Info\" tab in inbasket, or \"Choose Columns\" in Meds & Orders section of the refill encounter.            Passed - Patient does not have a diagnosis of CHF.        Passed - Medication is active on med list        Passed - Patient is 18 years old or older.          metoprolol tartrate (LOPRESSOR) 100 MG tablet 180 tablet 3     Sig: Take 1 tablet (100 mg) by mouth 2 times daily       Beta-Blockers Protocol Failed - 12/7/2022  4:37 PM        Failed - Blood pressure under 140/90 in past 12 months     BP Readings from Last 3 Encounters:   11/29/21 127/72   10/28/21 130/80   09/30/21 120/78                 Failed - Recent (12 mo) or future (30 days) visit within the authorizing provider's specialty     Patient has had an office visit with the authorizing provider or a provider within the authorizing providers department within the previous 12 mos or has a future within next 30 days. See \"Patient Info\" tab in inbasket, or \"Choose Columns\" in Meds & Orders section of the refill encounter.              " Passed - Patient is age 6 or older        Passed - Medication is active on med list

## 2022-12-13 RX ORDER — GLYBURIDE-METFORMIN HYDROCHLORIDE 5; 500 MG/1; MG/1
TABLET ORAL
Qty: 180 TABLET | Refills: 3 | Status: SHIPPED | OUTPATIENT
Start: 2022-12-13 | End: 2023-04-06

## 2022-12-13 RX ORDER — AMITRIPTYLINE HYDROCHLORIDE 10 MG/1
10 TABLET ORAL AT BEDTIME
Qty: 60 TABLET | Refills: 0 | Status: SHIPPED | OUTPATIENT
Start: 2022-12-13 | End: 2023-03-09

## 2022-12-13 RX ORDER — AMLODIPINE BESYLATE 10 MG/1
10 TABLET ORAL DAILY
Qty: 90 TABLET | Refills: 3 | Status: SHIPPED | OUTPATIENT
Start: 2022-12-13 | End: 2023-04-06

## 2022-12-13 RX ORDER — METOPROLOL TARTRATE 100 MG
100 TABLET ORAL 2 TIMES DAILY
Qty: 180 TABLET | Refills: 3 | Status: SHIPPED | OUTPATIENT
Start: 2022-12-13 | End: 2023-04-06

## 2023-02-09 DIAGNOSIS — F33.0 MAJOR DEPRESSIVE DISORDER, RECURRENT EPISODE, MILD (H): ICD-10-CM

## 2023-02-09 RX ORDER — PAROXETINE 20 MG/1
TABLET, FILM COATED ORAL
Qty: 90 TABLET | Refills: 0 | OUTPATIENT
Start: 2023-02-09

## 2023-02-09 NOTE — TELEPHONE ENCOUNTER
Pending Prescriptions:                       Disp   Refills    PARoxetine (PAXIL) 10 MG tablet [Pharmacy *90 tab*0        Sig: TAKE 1 TABLET BY MOUTH ONCE DAILY ALONG  WITH  20MG            TABLET  PER  DAY      Routing refill request to provider for review/approval because:  Patient needs to be seen because it has been more than 1 year since last office visit.    Sara Lei RN on 2/9/2023 at 4:16 PM

## 2023-02-12 RX ORDER — PAROXETINE 10 MG/1
TABLET, FILM COATED ORAL
Qty: 90 TABLET | Refills: 0 | Status: SHIPPED | OUTPATIENT
Start: 2023-02-12 | End: 2023-03-09

## 2023-02-12 NOTE — TELEPHONE ENCOUNTER
Needs office visit with Dr. Bosch prior to any further refills.  He hasn't been see since 10/25/2021.  Please schedule it for him.    Electronically signed by:  Dustin Luque M.D.  2/12/2023

## 2023-02-26 DIAGNOSIS — E78.5 HYPERLIPIDEMIA LDL GOAL <100: ICD-10-CM

## 2023-02-26 DIAGNOSIS — I10 ESSENTIAL HYPERTENSION WITH GOAL BLOOD PRESSURE LESS THAN 140/90: ICD-10-CM

## 2023-02-28 RX ORDER — LOVASTATIN 40 MG
TABLET ORAL
Qty: 45 TABLET | Refills: 0 | Status: SHIPPED | OUTPATIENT
Start: 2023-02-28 | End: 2023-04-06

## 2023-03-08 DIAGNOSIS — I10 HYPERTENSION GOAL BP (BLOOD PRESSURE) < 140/90: ICD-10-CM

## 2023-03-08 DIAGNOSIS — E78.5 HYPERLIPIDEMIA LDL GOAL <100: ICD-10-CM

## 2023-03-08 DIAGNOSIS — G47.9 SLEEP DISORDER: ICD-10-CM

## 2023-03-08 DIAGNOSIS — I10 ESSENTIAL HYPERTENSION WITH GOAL BLOOD PRESSURE LESS THAN 140/90: ICD-10-CM

## 2023-03-09 DIAGNOSIS — F33.0 MAJOR DEPRESSIVE DISORDER, RECURRENT EPISODE, MILD (H): ICD-10-CM

## 2023-03-09 RX ORDER — PAROXETINE 10 MG/1
TABLET, FILM COATED ORAL
Qty: 90 TABLET | Refills: 0 | Status: SHIPPED | OUTPATIENT
Start: 2023-03-09 | End: 2023-04-06

## 2023-03-09 RX ORDER — LOVASTATIN 40 MG
TABLET ORAL
Qty: 45 TABLET | Refills: 0 | OUTPATIENT
Start: 2023-03-09

## 2023-03-09 RX ORDER — LISINOPRIL 10 MG/1
TABLET ORAL
Qty: 90 TABLET | Refills: 0 | Status: SHIPPED | OUTPATIENT
Start: 2023-03-09 | End: 2023-04-06

## 2023-03-09 RX ORDER — AMITRIPTYLINE HYDROCHLORIDE 10 MG/1
TABLET ORAL
Qty: 60 TABLET | Refills: 0 | Status: SHIPPED | OUTPATIENT
Start: 2023-03-09 | End: 2023-04-06

## 2023-03-09 NOTE — TELEPHONE ENCOUNTER
"Requested Prescriptions   Pending Prescriptions Disp Refills    PARoxetine (PAXIL) 10 MG tablet 90 tablet 0     Sig: TAKE 1 TABLET BY MOUTH ONCE DAILY ALONG  WITH  20MG  TABLET  PER  DAY       SSRIs Protocol Failed - 3/9/2023 10:06 AM        Failed - PHQ-9 score less than 5 in past 6 months     Please review last PHQ-9 score.           Passed - Medication is active on med list        Passed - Patient is age 18 or older        Passed - Recent (6 mo) or future (30 days) visit within the authorizing provider's specialty     Patient had office visit in the last 6 months or has a visit in the next 30 days with authorizing provider or within the authorizing provider's specialty.  See \"Patient Info\" tab in inbasket, or \"Choose Columns\" in Meds & Orders section of the refill encounter.                 "

## 2023-03-09 NOTE — TELEPHONE ENCOUNTER
Pending Prescriptions:                       Disp   Refills    amitriptyline (ELAVIL) 10 MG tablet [Pharm*60 tab*0        Sig: TAKE 1 TABLET BY MOUTH AT BEDTIME    lisinopril (ZESTRIL) 10 MG tablet [Pharmac*90 tab*0        Sig: TAKE 1 TABLET BY MOUTH ONCE DAILY WILL  NOT  BE            ALLOWED  ANY  MORE  REFILLS  WITHOUT  A  CLINIC            APPOINTMENT  Refused Prescriptions:                       Disp   Refills    lovastatin (MEVACOR) 40 MG tablet [Pharmac*45 tab*0        Sig: TAKE 1 & 1/2 (ONE & ONE-HALF) TABLETS BY MOUTH ONCE           DAILY  Refused By: ISELA LEE  Reason for Refusal: Duplicate      Routing refill request to provider for review/approval because:  Vanesa given x1 and patient did not follow up, please advise    Isela Lee RN on 3/9/2023 at 11:27 AM

## 2023-04-06 ENCOUNTER — OFFICE VISIT (OUTPATIENT)
Dept: FAMILY MEDICINE | Facility: CLINIC | Age: 73
End: 2023-04-06
Payer: COMMERCIAL

## 2023-04-06 VITALS
RESPIRATION RATE: 20 BRPM | DIASTOLIC BLOOD PRESSURE: 76 MMHG | HEIGHT: 72 IN | HEART RATE: 69 BPM | OXYGEN SATURATION: 96 % | WEIGHT: 234 LBS | TEMPERATURE: 97.6 F | SYSTOLIC BLOOD PRESSURE: 122 MMHG | BODY MASS INDEX: 31.69 KG/M2

## 2023-04-06 DIAGNOSIS — E78.5 HYPERLIPIDEMIA LDL GOAL <100: ICD-10-CM

## 2023-04-06 DIAGNOSIS — F33.0 MAJOR DEPRESSIVE DISORDER, RECURRENT EPISODE, MILD (H): ICD-10-CM

## 2023-04-06 DIAGNOSIS — E11.9 TYPE 2 DIABETES MELLITUS WITHOUT COMPLICATION, WITHOUT LONG-TERM CURRENT USE OF INSULIN (H): ICD-10-CM

## 2023-04-06 DIAGNOSIS — I10 ESSENTIAL HYPERTENSION WITH GOAL BLOOD PRESSURE LESS THAN 140/90: ICD-10-CM

## 2023-04-06 DIAGNOSIS — I10 HYPERTENSION GOAL BP (BLOOD PRESSURE) < 140/90: ICD-10-CM

## 2023-04-06 DIAGNOSIS — G47.9 SLEEP DISORDER: ICD-10-CM

## 2023-04-06 DIAGNOSIS — J41.0 SIMPLE CHRONIC BRONCHITIS (H): ICD-10-CM

## 2023-04-06 LAB
ANION GAP SERPL CALCULATED.3IONS-SCNC: 12 MMOL/L (ref 7–15)
BUN SERPL-MCNC: 19 MG/DL (ref 8–23)
CALCIUM SERPL-MCNC: 8.9 MG/DL (ref 8.8–10.2)
CHLORIDE SERPL-SCNC: 104 MMOL/L (ref 98–107)
CHOLEST SERPL-MCNC: 176 MG/DL
CREAT SERPL-MCNC: 1.22 MG/DL (ref 0.67–1.17)
CREAT UR-MCNC: 210.6 MG/DL
DEPRECATED HCO3 PLAS-SCNC: 25 MMOL/L (ref 22–29)
ERYTHROCYTE [DISTWIDTH] IN BLOOD BY AUTOMATED COUNT: 14.6 % (ref 10–15)
GFR SERPL CREATININE-BSD FRML MDRD: 63 ML/MIN/1.73M2
GLUCOSE SERPL-MCNC: 171 MG/DL (ref 70–99)
HBA1C MFR BLD: 7.2 %
HCT VFR BLD AUTO: 45.7 % (ref 40–53)
HDLC SERPL-MCNC: 45 MG/DL
HGB BLD-MCNC: 14.4 G/DL (ref 13.3–17.7)
LDLC SERPL CALC-MCNC: 98 MG/DL
MCH RBC QN AUTO: 29.1 PG (ref 26.5–33)
MCHC RBC AUTO-ENTMCNC: 31.5 G/DL (ref 31.5–36.5)
MCV RBC AUTO: 92 FL (ref 78–100)
MICROALBUMIN UR-MCNC: 51.1 MG/L
MICROALBUMIN/CREAT UR: 24.26 MG/G CR (ref 0–17)
NONHDLC SERPL-MCNC: 131 MG/DL
PLATELET # BLD AUTO: 359 10E3/UL (ref 150–450)
POTASSIUM SERPL-SCNC: 4.4 MMOL/L (ref 3.4–5.3)
RBC # BLD AUTO: 4.95 10E6/UL (ref 4.4–5.9)
SODIUM SERPL-SCNC: 141 MMOL/L (ref 136–145)
TRIGL SERPL-MCNC: 164 MG/DL
WBC # BLD AUTO: 14.1 10E3/UL (ref 4–11)

## 2023-04-06 PROCEDURE — 99214 OFFICE O/P EST MOD 30 MIN: CPT | Performed by: FAMILY MEDICINE

## 2023-04-06 PROCEDURE — 82043 UR ALBUMIN QUANTITATIVE: CPT | Performed by: FAMILY MEDICINE

## 2023-04-06 PROCEDURE — 83036 HEMOGLOBIN GLYCOSYLATED A1C: CPT | Performed by: FAMILY MEDICINE

## 2023-04-06 PROCEDURE — 80061 LIPID PANEL: CPT | Performed by: FAMILY MEDICINE

## 2023-04-06 PROCEDURE — 85027 COMPLETE CBC AUTOMATED: CPT | Performed by: FAMILY MEDICINE

## 2023-04-06 PROCEDURE — 36415 COLL VENOUS BLD VENIPUNCTURE: CPT | Performed by: FAMILY MEDICINE

## 2023-04-06 PROCEDURE — 82570 ASSAY OF URINE CREATININE: CPT | Performed by: FAMILY MEDICINE

## 2023-04-06 PROCEDURE — 80048 BASIC METABOLIC PNL TOTAL CA: CPT | Performed by: FAMILY MEDICINE

## 2023-04-06 RX ORDER — GLYBURIDE-METFORMIN HYDROCHLORIDE 5; 500 MG/1; MG/1
TABLET ORAL
Qty: 180 TABLET | Refills: 3 | Status: SHIPPED | OUTPATIENT
Start: 2023-04-06 | End: 2024-04-23

## 2023-04-06 RX ORDER — PAROXETINE 20 MG/1
20 TABLET, FILM COATED ORAL EVERY MORNING
Qty: 90 TABLET | Refills: 0 | Status: SHIPPED | OUTPATIENT
Start: 2023-04-06 | End: 2024-04-23

## 2023-04-06 RX ORDER — METOPROLOL TARTRATE 100 MG
100 TABLET ORAL 2 TIMES DAILY
Qty: 180 TABLET | Refills: 3 | Status: SHIPPED | OUTPATIENT
Start: 2023-04-06 | End: 2024-04-23

## 2023-04-06 RX ORDER — PAROXETINE 10 MG/1
TABLET, FILM COATED ORAL
Qty: 90 TABLET | Refills: 3 | Status: SHIPPED | OUTPATIENT
Start: 2023-04-06 | End: 2024-04-01

## 2023-04-06 RX ORDER — AMLODIPINE BESYLATE 10 MG/1
10 TABLET ORAL DAILY
Qty: 90 TABLET | Refills: 3 | Status: SHIPPED | OUTPATIENT
Start: 2023-04-06 | End: 2024-04-23

## 2023-04-06 RX ORDER — IPRATROPIUM BROMIDE AND ALBUTEROL SULFATE 2.5; .5 MG/3ML; MG/3ML
3 SOLUTION RESPIRATORY (INHALATION) EVERY 4 HOURS PRN
Qty: 3 ML | Refills: 3 | Status: SHIPPED | OUTPATIENT
Start: 2023-04-06 | End: 2024-04-23

## 2023-04-06 RX ORDER — LOVASTATIN 40 MG
60 TABLET ORAL AT BEDTIME
Qty: 135 TABLET | Refills: 3 | Status: SHIPPED | OUTPATIENT
Start: 2023-04-06 | End: 2024-04-01

## 2023-04-06 RX ORDER — LISINOPRIL 10 MG/1
TABLET ORAL
Qty: 90 TABLET | Refills: 3 | Status: SHIPPED | OUTPATIENT
Start: 2023-04-06 | End: 2024-04-23

## 2023-04-06 RX ORDER — AMITRIPTYLINE HYDROCHLORIDE 10 MG/1
10-30 TABLET ORAL AT BEDTIME
Qty: 270 TABLET | Refills: 3 | Status: SHIPPED | OUTPATIENT
Start: 2023-04-06 | End: 2024-04-01

## 2023-04-06 ASSESSMENT — PATIENT HEALTH QUESTIONNAIRE - PHQ9
SUM OF ALL RESPONSES TO PHQ QUESTIONS 1-9: 0
SUM OF ALL RESPONSES TO PHQ QUESTIONS 1-9: 0
10. IF YOU CHECKED OFF ANY PROBLEMS, HOW DIFFICULT HAVE THESE PROBLEMS MADE IT FOR YOU TO DO YOUR WORK, TAKE CARE OF THINGS AT HOME, OR GET ALONG WITH OTHER PEOPLE: NOT DIFFICULT AT ALL

## 2023-04-06 ASSESSMENT — PAIN SCALES - GENERAL: PAINLEVEL: NO PAIN (0)

## 2023-04-06 NOTE — PROGRESS NOTES
Assessment & Plan     Diabetes mellitus, type 2 (H)      Essential hypertension with goal blood pressure less than 140/90    - BASIC METABOLIC PANEL  - lovastatin (MEVACOR) 40 MG tablet  Dispense: 135 tablet; Refill: 3  - metoprolol tartrate (LOPRESSOR) 100 MG tablet  Dispense: 180 tablet; Refill: 3  - BASIC METABOLIC PANEL    Type 2 diabetes mellitus without complication, without long-term current use of insulin (H)    - HEMOGLOBIN A1C  - Lipid panel reflex to direct LDL Non-fasting  - CBC with platelets  - ACE/ARB/ARNI NOT PRESCRIBED (INTENTIONAL)  - blood glucose (ACCU-CHEK WENDY) test strip  Dispense: 300 strip; Refill: 3  - glyBURIDE-metFORMIN (GLUCOVANCE) 5-500 MG tablet  Dispense: 180 tablet; Refill: 3  - HEMOGLOBIN A1C  - Lipid panel reflex to direct LDL Non-fasting  - CBC with platelets    Sleep disorder    - amitriptyline (ELAVIL) 10 MG tablet  Dispense: 270 tablet; Refill: 3    Hypertension goal BP (blood pressure) < 140/90    - amLODIPine (NORVASC) 10 MG tablet  Dispense: 90 tablet; Refill: 3  - lisinopril (ZESTRIL) 10 MG tablet  Dispense: 90 tablet; Refill: 3  - Albumin Random Urine Quantitative with Creat Ratio  - Albumin Random Urine Quantitative with Creat Ratio    Simple chronic bronchitis (H)    - ipratropium - albuterol 0.5 mg/2.5 mg/3 mL (DUONEB) 0.5-2.5 (3) MG/3ML neb solution  Dispense: 3 mL; Refill: 3    Hyperlipidemia LDL goal <100    - lovastatin (MEVACOR) 40 MG tablet  Dispense: 135 tablet; Refill: 3  - metoprolol tartrate (LOPRESSOR) 100 MG tablet  Dispense: 180 tablet; Refill: 3    Major depressive disorder, recurrent episode, mild (H)    - PARoxetine (PAXIL) 10 MG tablet  Dispense: 90 tablet; Refill: 3  - PARoxetine (PAXIL) 20 MG tablet  Dispense: 90 tablet; Refill: 0    Patient's labs all look appropriate.  No changes medications at this time.  I will reach out to him and let him know that.      Thomas Bosch MD, MD [facility] is a  72 year old, presenting for the following health issues:  Diabetes    History of Present Illness       Diabetes:   He presents for follow up of diabetes.  He is checking home blood glucose two times daily. He checks blood glucose before and after meals.  Blood glucose is never over 200 and never under 70. When his blood glucose is low, the patient is asymptomatic for confusion, blurred vision, lethargy and reports not feeling dizzy, shaky, or weak.  He has no concerns regarding his diabetes at this time.  He is not experiencing numbness or burning in feet, excessive thirst, blurry vision, weight changes or redness, sores or blisters on feet. The patient has had a diabetic eye exam in the last 12 months. Eye exam performed on year ago. Location of last eye exam [facility].         Today's PHQ-9         PHQ-9 Total Score: 0    PHQ-9 Q9 Thoughts of better off dead/self-harm past 2 weeks :   Not at all    How difficult have these problems made it for you to do your work, take care of things at home, or get along with other people: Not difficult at all               Review of Systems   Constitutional, HEENT, cardiovascular, pulmonary, gi and gu systems are negative, except as otherwise noted.      Objective    /76   Pulse 69   Temp 97.6  F (36.4  C) (Temporal)   Resp 20   Ht 1.829 m (6')   Wt 106.1 kg (234 lb)   SpO2 96%   BMI 31.74 kg/m    Body mass index is 31.74 kg/m .  Physical Exam   GENERAL: healthy, alert and no distress  NECK: no adenopathy, no asymmetry, masses, or scars and thyroid normal to palpation  RESP: lungs clear to auscultation - no rales, rhonchi or wheezes  CV: regular rate and rhythm, normal S1 S2, no S3 or S4, no murmur, click or rub, no peripheral edema and peripheral pulses strong  ABDOMEN: soft, nontender, no hepatosplenomegaly, no masses and bowel sounds normal  MS: no gross musculoskeletal defects noted, no edema    Results for orders placed or performed in visit on 04/06/23 (from  the past 24 hour(s))   HEMOGLOBIN A1C   Result Value Ref Range    Hemoglobin A1C 7.2 (H) <5.7 %   BASIC METABOLIC PANEL   Result Value Ref Range    Sodium 141 136 - 145 mmol/L    Potassium 4.4 3.4 - 5.3 mmol/L    Chloride 104 98 - 107 mmol/L    Carbon Dioxide (CO2) 25 22 - 29 mmol/L    Anion Gap 12 7 - 15 mmol/L    Urea Nitrogen 19.0 8.0 - 23.0 mg/dL    Creatinine 1.22 (H) 0.67 - 1.17 mg/dL    Calcium 8.9 8.8 - 10.2 mg/dL    Glucose 171 (H) 70 - 99 mg/dL    GFR Estimate 63 >60 mL/min/1.73m2   Lipid panel reflex to direct LDL Non-fasting   Result Value Ref Range    Cholesterol 176 <200 mg/dL    Triglycerides 164 (H) <150 mg/dL    Direct Measure HDL 45 >=40 mg/dL    LDL Cholesterol Calculated 98 <=100 mg/dL    Non HDL Cholesterol 131 (H) <130 mg/dL    Narrative    Cholesterol  Desirable:  <200 mg/dL    Triglycerides  Normal:  Less than 150 mg/dL  Borderline High:  150-199 mg/dL  High:  200-499 mg/dL  Very High:  Greater than or equal to 500 mg/dL    Direct Measure HDL  Female:  Greater than or equal to 50 mg/dL   Male:  Greater than or equal to 40 mg/dL    LDL Cholesterol  Desirable:  <100mg/dL  Above Desirable:  100-129 mg/dL   Borderline High:  130-159 mg/dL   High:  160-189 mg/dL   Very High:  >= 190 mg/dL    Non HDL Cholesterol  Desirable:  130 mg/dL  Above Desirable:  130-159 mg/dL  Borderline High:  160-189 mg/dL  High:  190-219 mg/dL  Very High:  Greater than or equal to 220 mg/dL   CBC with platelets   Result Value Ref Range    WBC Count 14.1 (H) 4.0 - 11.0 10e3/uL    RBC Count 4.95 4.40 - 5.90 10e6/uL    Hemoglobin 14.4 13.3 - 17.7 g/dL    Hematocrit 45.7 40.0 - 53.0 %    MCV 92 78 - 100 fL    MCH 29.1 26.5 - 33.0 pg    MCHC 31.5 31.5 - 36.5 g/dL    RDW 14.6 10.0 - 15.0 %    Platelet Count 359 150 - 450 10e3/uL   Albumin Random Urine Quantitative with Creat Ratio   Result Value Ref Range    Creatinine Urine mg/dL 210.6 mg/dL    Albumin Urine mg/L 51.1 mg/L    Albumin Urine mg/g Cr 24.26 (H) 0.00 - 17.00 mg/g  Cr

## 2023-04-10 DIAGNOSIS — E11.9 TYPE 2 DIABETES MELLITUS WITHOUT COMPLICATION, WITHOUT LONG-TERM CURRENT USE OF INSULIN (H): ICD-10-CM

## 2023-05-02 ENCOUNTER — HOSPITAL ENCOUNTER (EMERGENCY)
Facility: CLINIC | Age: 73
Discharge: HOME | End: 2023-05-02
Attending: EMERGENCY MEDICINE | Admitting: EMERGENCY MEDICINE
Payer: MEDICARE

## 2023-05-02 VITALS
OXYGEN SATURATION: 98 % | BODY MASS INDEX: 31.74 KG/M2 | DIASTOLIC BLOOD PRESSURE: 100 MMHG | TEMPERATURE: 98.1 F | HEART RATE: 74 BPM | WEIGHT: 234 LBS | RESPIRATION RATE: 16 BRPM | SYSTOLIC BLOOD PRESSURE: 170 MMHG

## 2023-05-02 DIAGNOSIS — R10.9 RIGHT FLANK PAIN: ICD-10-CM

## 2023-05-02 LAB
ALBUMIN UR-MCNC: 30 MG/DL
ANION GAP SERPL CALCULATED.3IONS-SCNC: 12 MMOL/L (ref 7–15)
APPEARANCE UR: ABNORMAL
BASOPHILS # BLD AUTO: 0 10E3/UL (ref 0–0.2)
BASOPHILS NFR BLD AUTO: 0 %
BILIRUB UR QL STRIP: NEGATIVE
BUN SERPL-MCNC: 18.8 MG/DL (ref 8–23)
CALCIUM SERPL-MCNC: 8.6 MG/DL (ref 8.8–10.2)
CHLORIDE SERPL-SCNC: 101 MMOL/L (ref 98–107)
COLOR UR AUTO: YELLOW
CREAT SERPL-MCNC: 1.38 MG/DL (ref 0.67–1.17)
DEPRECATED HCO3 PLAS-SCNC: 26 MMOL/L (ref 22–29)
EOSINOPHIL # BLD AUTO: 0.2 10E3/UL (ref 0–0.7)
EOSINOPHIL NFR BLD AUTO: 2 %
ERYTHROCYTE [DISTWIDTH] IN BLOOD BY AUTOMATED COUNT: 14.4 % (ref 10–15)
GFR SERPL CREATININE-BSD FRML MDRD: 54 ML/MIN/1.73M2
GLUCOSE SERPL-MCNC: 130 MG/DL (ref 70–99)
GLUCOSE UR STRIP-MCNC: NEGATIVE MG/DL
HCT VFR BLD AUTO: 43.1 % (ref 40–53)
HGB BLD-MCNC: 14 G/DL (ref 13.3–17.7)
HGB UR QL STRIP: NEGATIVE
HYALINE CASTS: 3 /LPF
IMM GRANULOCYTES # BLD: 0 10E3/UL
IMM GRANULOCYTES NFR BLD: 0 %
KETONES UR STRIP-MCNC: NEGATIVE MG/DL
LEUKOCYTE ESTERASE UR QL STRIP: ABNORMAL
LYMPHOCYTES # BLD AUTO: 1.7 10E3/UL (ref 0.8–5.3)
LYMPHOCYTES NFR BLD AUTO: 15 %
MCH RBC QN AUTO: 29.2 PG (ref 26.5–33)
MCHC RBC AUTO-ENTMCNC: 32.5 G/DL (ref 31.5–36.5)
MCV RBC AUTO: 90 FL (ref 78–100)
MONOCYTES # BLD AUTO: 0.9 10E3/UL (ref 0–1.3)
MONOCYTES NFR BLD AUTO: 9 %
MUCOUS THREADS #/AREA URNS LPF: PRESENT /LPF
NEUTROPHILS # BLD AUTO: 8.1 10E3/UL (ref 1.6–8.3)
NEUTROPHILS NFR BLD AUTO: 74 %
NITRATE UR QL: NEGATIVE
NRBC # BLD AUTO: 0 10E3/UL
NRBC BLD AUTO-RTO: 0 /100
PH UR STRIP: 5 [PH] (ref 5–7)
PLATELET # BLD AUTO: 345 10E3/UL (ref 150–450)
POTASSIUM SERPL-SCNC: 4.3 MMOL/L (ref 3.4–5.3)
RBC # BLD AUTO: 4.8 10E6/UL (ref 4.4–5.9)
RBC URINE: 4 /HPF
SODIUM SERPL-SCNC: 139 MMOL/L (ref 136–145)
SP GR UR STRIP: 1.02 (ref 1–1.03)
SPERM #/AREA URNS HPF: PRESENT /HPF
SQUAMOUS EPITHELIAL: <1 /HPF
UROBILINOGEN UR STRIP-MCNC: 2 MG/DL
WBC # BLD AUTO: 11 10E3/UL (ref 4–11)
WBC URINE: 13 /HPF

## 2023-05-02 PROCEDURE — 96361 HYDRATE IV INFUSION ADD-ON: CPT | Performed by: EMERGENCY MEDICINE

## 2023-05-02 PROCEDURE — 36415 COLL VENOUS BLD VENIPUNCTURE: CPT | Performed by: EMERGENCY MEDICINE

## 2023-05-02 PROCEDURE — 81001 URINALYSIS AUTO W/SCOPE: CPT | Performed by: EMERGENCY MEDICINE

## 2023-05-02 PROCEDURE — 96374 THER/PROPH/DIAG INJ IV PUSH: CPT | Performed by: EMERGENCY MEDICINE

## 2023-05-02 PROCEDURE — 80048 BASIC METABOLIC PNL TOTAL CA: CPT | Performed by: EMERGENCY MEDICINE

## 2023-05-02 PROCEDURE — 258N000003 HC RX IP 258 OP 636: Performed by: EMERGENCY MEDICINE

## 2023-05-02 PROCEDURE — 250N000011 HC RX IP 250 OP 636: Performed by: EMERGENCY MEDICINE

## 2023-05-02 PROCEDURE — 85025 COMPLETE CBC W/AUTO DIFF WBC: CPT | Performed by: EMERGENCY MEDICINE

## 2023-05-02 PROCEDURE — 87086 URINE CULTURE/COLONY COUNT: CPT | Performed by: EMERGENCY MEDICINE

## 2023-05-02 PROCEDURE — 99284 EMERGENCY DEPT VISIT MOD MDM: CPT | Mod: 25 | Performed by: EMERGENCY MEDICINE

## 2023-05-02 PROCEDURE — 99284 EMERGENCY DEPT VISIT MOD MDM: CPT | Performed by: EMERGENCY MEDICINE

## 2023-05-02 RX ORDER — SODIUM CHLORIDE 9 MG/ML
INJECTION, SOLUTION INTRAVENOUS CONTINUOUS
Status: DISCONTINUED | OUTPATIENT
Start: 2023-05-02 | End: 2023-05-02 | Stop reason: HOSPADM

## 2023-05-02 RX ORDER — CYCLOBENZAPRINE HCL 10 MG
10 TABLET ORAL 3 TIMES DAILY PRN
Qty: 20 TABLET | Refills: 0 | Status: SHIPPED | OUTPATIENT
Start: 2023-05-02 | End: 2023-05-09

## 2023-05-02 RX ORDER — KETOROLAC TROMETHAMINE 15 MG/ML
15 INJECTION, SOLUTION INTRAMUSCULAR; INTRAVENOUS ONCE
Status: COMPLETED | OUTPATIENT
Start: 2023-05-02 | End: 2023-05-02

## 2023-05-02 RX ADMIN — SODIUM CHLORIDE 1000 ML: 9 INJECTION, SOLUTION INTRAVENOUS at 16:14

## 2023-05-02 RX ADMIN — KETOROLAC TROMETHAMINE 15 MG: 15 INJECTION, SOLUTION INTRAMUSCULAR; INTRAVENOUS at 16:14

## 2023-05-02 ASSESSMENT — ACTIVITIES OF DAILY LIVING (ADL): ADLS_ACUITY_SCORE: 35

## 2023-05-02 NOTE — DISCHARGE INSTRUCTIONS
Your exam is more consistent with muscle spasm.  The urine does have a few white cells present so I did culture of the urine.  If bacteria grows out that needs antibiotics we can call in a prescription for you.  In the meantime we will treat you with a muscle relaxant..  Take that as directed 3 times a day.  You can use ibuprofen for additional pain relief as the Toradol we gave you in the emergency room seem to help.  You can apply ice to affected area.  Initially avoid heat as if it is muscle spasm the heat may make matters worse.  If you do have worsening symptoms such as uncontrolled pain, fever, vomiting, or urinary symptoms/abdominal pain please return for reassessment.  If you develop a rash similar to previous shingles contact your doctor for antiviral medicine.
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
## 2023-05-02 NOTE — ED TRIAGE NOTES
Pt presents with left flank/bank pain. Pt states radiates to right lateral leg.      Triage Assessment     Row Name 05/02/23 1523       Triage Assessment (Adult)    Airway WDL WDL       Respiratory WDL    Respiratory WDL WDL       Skin Circulation/Temperature WDL    Skin Circulation/Temperature WDL WDL       Cardiac WDL    Cardiac WDL WDL       Peripheral/Neurovascular WDL    Peripheral Neurovascular WDL WDL       Cognitive/Neuro/Behavioral WDL    Cognitive/Neuro/Behavioral WDL WDL

## 2023-05-04 LAB — BACTERIA UR CULT: NO GROWTH

## 2023-08-29 ENCOUNTER — TRANSFERRED RECORDS (OUTPATIENT)
Dept: HEALTH INFORMATION MANAGEMENT | Facility: CLINIC | Age: 73
End: 2023-08-29
Payer: COMMERCIAL

## 2023-08-29 LAB — RETINOPATHY: NEGATIVE

## 2024-04-01 DIAGNOSIS — G47.9 SLEEP DISORDER: ICD-10-CM

## 2024-04-01 DIAGNOSIS — F33.0 MAJOR DEPRESSIVE DISORDER, RECURRENT EPISODE, MILD (H): ICD-10-CM

## 2024-04-01 DIAGNOSIS — I10 ESSENTIAL HYPERTENSION WITH GOAL BLOOD PRESSURE LESS THAN 140/90: ICD-10-CM

## 2024-04-01 DIAGNOSIS — E78.5 HYPERLIPIDEMIA LDL GOAL <100: ICD-10-CM

## 2024-04-01 RX ORDER — AMITRIPTYLINE HYDROCHLORIDE 10 MG/1
TABLET ORAL
Qty: 90 TABLET | Refills: 0 | Status: SHIPPED | OUTPATIENT
Start: 2024-04-01 | End: 2024-04-23

## 2024-04-01 RX ORDER — LOVASTATIN 40 MG
TABLET ORAL
Qty: 45 TABLET | Refills: 0 | Status: SHIPPED | OUTPATIENT
Start: 2024-04-01 | End: 2024-04-23

## 2024-04-01 RX ORDER — PAROXETINE 10 MG/1
TABLET, FILM COATED ORAL
Qty: 30 TABLET | Refills: 0 | Status: SHIPPED | OUTPATIENT
Start: 2024-04-01 | End: 2024-04-23

## 2024-04-04 ENCOUNTER — TELEPHONE (OUTPATIENT)
Dept: FAMILY MEDICINE | Facility: CLINIC | Age: 74
End: 2024-04-04
Payer: COMMERCIAL

## 2024-04-04 NOTE — TELEPHONE ENCOUNTER
Reason for Call:  Appointment Request    Patient requesting this type of appt: Chronic Diease Management/Medication/Follow-Up    Requested provider: Thomas Bosch    Reason patient unable to be scheduled: Not with their preferred provider    When does patient want to be seen/preferred time: 1-2 weeks    Comments: medication check and good bye    Okay to leave a detailed message?: Yes at Home number on file [phone] (home) or Cell number on file:    Telephone Information:   Mobile [phone]       Call taken on 4/4/2024 at 8:47 AM by Esvin Soler

## 2024-04-17 ENCOUNTER — TELEPHONE (OUTPATIENT)
Dept: FAMILY MEDICINE | Facility: CLINIC | Age: 74
End: 2024-04-17

## 2024-04-17 NOTE — TELEPHONE ENCOUNTER
Patient returning call after a message that was left for him. Informed him that someone will contact him after 1 pm today when his PCP is in and reviews this message.     Shirley Reyes RN on 4/17/2024 at 10:39 AM

## 2024-04-17 NOTE — TELEPHONE ENCOUNTER
Reason for Call:  Same Day Appointment, Requested Provider:  Dr. Bosch    PCP: Thomas Bosch    Reason for visit: f/u diabetes, HTN, cholesterol, COPD and depression     Duration of symptoms: Was scheduled for this morning and PCP was not available. Is not able to come in on Monday will be out of town. Wondering if he can be seen later today.     Have you been treated for this in the past? Yes    Additional comments: please call once this has been addressed    Can we leave a detailed message on this number? YES    Phone number patient can be reached at: Home number on file [phone] (home)    Best Time: any    Call taken on 4/17/2024 at 9:38 AM by Alycia Cardenas CNA

## 2024-04-18 NOTE — TELEPHONE ENCOUNTER
Thomas Bosch MD  [facility] Pool18 hours ago (3:05 PM)     RF  I can see him next Tues if he would like or have him establish care with Edgar Jeff

## 2024-04-23 ENCOUNTER — OFFICE VISIT (OUTPATIENT)
Dept: FAMILY MEDICINE | Facility: CLINIC | Age: 74
End: 2024-04-23
Payer: COMMERCIAL

## 2024-04-23 VITALS
BODY MASS INDEX: 31.46 KG/M2 | OXYGEN SATURATION: 97 % | WEIGHT: 232.25 LBS | SYSTOLIC BLOOD PRESSURE: 139 MMHG | HEART RATE: 67 BPM | DIASTOLIC BLOOD PRESSURE: 84 MMHG | RESPIRATION RATE: 18 BRPM | TEMPERATURE: 97.2 F | HEIGHT: 72 IN

## 2024-04-23 DIAGNOSIS — I10 ESSENTIAL HYPERTENSION WITH GOAL BLOOD PRESSURE LESS THAN 140/90: Primary | ICD-10-CM

## 2024-04-23 DIAGNOSIS — J44.9 CHRONIC OBSTRUCTIVE PULMONARY DISEASE, UNSPECIFIED COPD TYPE (H): Primary | ICD-10-CM

## 2024-04-23 DIAGNOSIS — I10 HYPERTENSION GOAL BP (BLOOD PRESSURE) < 140/90: ICD-10-CM

## 2024-04-23 DIAGNOSIS — E78.5 HYPERLIPIDEMIA LDL GOAL <100: ICD-10-CM

## 2024-04-23 DIAGNOSIS — J41.0 SIMPLE CHRONIC BRONCHITIS (H): ICD-10-CM

## 2024-04-23 DIAGNOSIS — J41.8 MIXED SIMPLE AND MUCOPURULENT CHRONIC BRONCHITIS (H): ICD-10-CM

## 2024-04-23 DIAGNOSIS — E11.9 TYPE 2 DIABETES, HBA1C GOAL < 7% (H): ICD-10-CM

## 2024-04-23 DIAGNOSIS — F33.0 MAJOR DEPRESSIVE DISORDER, RECURRENT EPISODE, MILD (H): ICD-10-CM

## 2024-04-23 DIAGNOSIS — G47.9 SLEEP DISORDER: ICD-10-CM

## 2024-04-23 DIAGNOSIS — N18.31 STAGE 3A CHRONIC KIDNEY DISEASE (H): ICD-10-CM

## 2024-04-23 LAB
ANION GAP SERPL CALCULATED.3IONS-SCNC: 11 MMOL/L (ref 7–15)
BUN SERPL-MCNC: 16.7 MG/DL (ref 8–23)
CALCIUM SERPL-MCNC: 9.4 MG/DL (ref 8.8–10.2)
CHLORIDE SERPL-SCNC: 102 MMOL/L (ref 98–107)
CHOLEST SERPL-MCNC: 158 MG/DL
CREAT SERPL-MCNC: 1.38 MG/DL (ref 0.67–1.17)
CREAT UR-MCNC: 155.6 MG/DL
DEPRECATED HCO3 PLAS-SCNC: 27 MMOL/L (ref 22–29)
EGFRCR SERPLBLD CKD-EPI 2021: 54 ML/MIN/1.73M2
FASTING STATUS PATIENT QL REPORTED: YES
GLUCOSE SERPL-MCNC: 155 MG/DL (ref 70–99)
HBA1C MFR BLD: 7.9 %
HDLC SERPL-MCNC: 40 MG/DL
HGB BLD-MCNC: 15 G/DL (ref 13.3–17.7)
LDLC SERPL CALC-MCNC: 88 MG/DL
MICROALBUMIN UR-MCNC: 49.5 MG/L
MICROALBUMIN/CREAT UR: 31.81 MG/G CR (ref 0–17)
NONHDLC SERPL-MCNC: 118 MG/DL
POTASSIUM SERPL-SCNC: 4.4 MMOL/L (ref 3.4–5.3)
SODIUM SERPL-SCNC: 140 MMOL/L (ref 135–145)
TRIGL SERPL-MCNC: 151 MG/DL

## 2024-04-23 PROCEDURE — 82570 ASSAY OF URINE CREATININE: CPT | Performed by: FAMILY MEDICINE

## 2024-04-23 PROCEDURE — 84244 ASSAY OF RENIN: CPT | Mod: 90 | Performed by: FAMILY MEDICINE

## 2024-04-23 PROCEDURE — 80061 LIPID PANEL: CPT | Performed by: FAMILY MEDICINE

## 2024-04-23 PROCEDURE — 36415 COLL VENOUS BLD VENIPUNCTURE: CPT | Performed by: FAMILY MEDICINE

## 2024-04-23 PROCEDURE — 99000 SPECIMEN HANDLING OFFICE-LAB: CPT | Performed by: FAMILY MEDICINE

## 2024-04-23 PROCEDURE — 99214 OFFICE O/P EST MOD 30 MIN: CPT | Performed by: FAMILY MEDICINE

## 2024-04-23 PROCEDURE — 80048 BASIC METABOLIC PNL TOTAL CA: CPT | Performed by: FAMILY MEDICINE

## 2024-04-23 PROCEDURE — 82043 UR ALBUMIN QUANTITATIVE: CPT | Performed by: FAMILY MEDICINE

## 2024-04-23 PROCEDURE — 82088 ASSAY OF ALDOSTERONE: CPT | Performed by: FAMILY MEDICINE

## 2024-04-23 PROCEDURE — 83036 HEMOGLOBIN GLYCOSYLATED A1C: CPT | Performed by: FAMILY MEDICINE

## 2024-04-23 PROCEDURE — 85018 HEMOGLOBIN: CPT | Performed by: FAMILY MEDICINE

## 2024-04-23 RX ORDER — AMLODIPINE BESYLATE 10 MG/1
10 TABLET ORAL DAILY
Qty: 90 TABLET | Refills: 3 | Status: SHIPPED | OUTPATIENT
Start: 2024-04-23

## 2024-04-23 RX ORDER — LOVASTATIN 40 MG
60 TABLET ORAL AT BEDTIME
Qty: 135 TABLET | Refills: 0 | Status: SHIPPED | OUTPATIENT
Start: 2024-04-23 | End: 2024-07-19

## 2024-04-23 RX ORDER — BLOOD-GLUCOSE CONTROL, NORMAL
1 EACH MISCELLANEOUS
Qty: 1 EACH | Refills: 3 | Status: SHIPPED | OUTPATIENT
Start: 2024-04-23

## 2024-04-23 RX ORDER — RESPIRATORY SYNCYTIAL VIRUS VACCINE 120MCG/0.5
0.5 KIT INTRAMUSCULAR ONCE
Qty: 1 EACH | Refills: 0 | Status: CANCELLED | OUTPATIENT
Start: 2024-04-23 | End: 2024-04-23

## 2024-04-23 RX ORDER — IPRATROPIUM BROMIDE AND ALBUTEROL SULFATE 2.5; .5 MG/3ML; MG/3ML
3 SOLUTION RESPIRATORY (INHALATION) EVERY 4 HOURS PRN
Qty: 3 ML | Refills: 3 | Status: SHIPPED | OUTPATIENT
Start: 2024-04-23

## 2024-04-23 RX ORDER — LISINOPRIL 10 MG/1
TABLET ORAL
Qty: 90 TABLET | Refills: 3 | Status: SHIPPED | OUTPATIENT
Start: 2024-04-23

## 2024-04-23 RX ORDER — PAROXETINE 20 MG/1
20 TABLET, FILM COATED ORAL EVERY MORNING
Qty: 90 TABLET | Refills: 3 | Status: SHIPPED | OUTPATIENT
Start: 2024-04-23

## 2024-04-23 RX ORDER — METOPROLOL TARTRATE 100 MG
100 TABLET ORAL 2 TIMES DAILY
Qty: 180 TABLET | Refills: 3 | Status: SHIPPED | OUTPATIENT
Start: 2024-04-23

## 2024-04-23 RX ORDER — AMITRIPTYLINE HYDROCHLORIDE 10 MG/1
TABLET ORAL
Qty: 270 TABLET | Refills: 3 | Status: SHIPPED | OUTPATIENT
Start: 2024-04-23

## 2024-04-23 RX ORDER — PAROXETINE 10 MG/1
TABLET, FILM COATED ORAL
Qty: 90 TABLET | Refills: 3 | Status: SHIPPED | OUTPATIENT
Start: 2024-04-23

## 2024-04-23 RX ORDER — GLYBURIDE-METFORMIN HYDROCHLORIDE 5; 500 MG/1; MG/1
TABLET ORAL
Qty: 180 TABLET | Refills: 3 | Status: SHIPPED | OUTPATIENT
Start: 2024-04-23

## 2024-04-23 ASSESSMENT — PAIN SCALES - GENERAL: PAINLEVEL: NO PAIN (0)

## 2024-04-23 ASSESSMENT — PATIENT HEALTH QUESTIONNAIRE - PHQ9
SUM OF ALL RESPONSES TO PHQ QUESTIONS 1-9: 7
SUM OF ALL RESPONSES TO PHQ QUESTIONS 1-9: 7
10. IF YOU CHECKED OFF ANY PROBLEMS, HOW DIFFICULT HAVE THESE PROBLEMS MADE IT FOR YOU TO DO YOUR WORK, TAKE CARE OF THINGS AT HOME, OR GET ALONG WITH OTHER PEOPLE: SOMEWHAT DIFFICULT

## 2024-04-23 NOTE — LETTER
April 23, 2024      Mj Ruth  [address] [facility] [address]        Dear ,    We are writing to inform you of your test results.    Your labs look good no worries   Take care my friend     Resulted Orders   HEMOGLOBIN A1C   Result Value Ref Range    Hemoglobin A1C 7.9 (H) <5.7 %      Comment:      Normal <5.7%   Prediabetes 5.7-6.4%    Diabetes 6.5% or higher     Note: Adopted from ADA consensus guidelines.   Lipid panel reflex to direct LDL Non-fasting   Result Value Ref Range    Cholesterol 158 <200 mg/dL    Triglycerides 151 (H) <150 mg/dL    Direct Measure HDL 40 >=40 mg/dL    LDL Cholesterol Calculated 88 <=100 mg/dL    Non HDL Cholesterol 118 <130 mg/dL    Patient Fasting > 8hrs? Yes     Narrative    Cholesterol  Desirable:  <200 mg/dL    Triglycerides  Normal:  Less than 150 mg/dL  Borderline High:  150-199 mg/dL  High:  200-499 mg/dL  Very High:  Greater than or equal to 500 mg/dL    Direct Measure HDL  Female:  Greater than or equal to 50 mg/dL   Male:  Greater than or equal to 40 mg/dL    LDL Cholesterol  Desirable:  <100mg/dL  Above Desirable:  100-129 mg/dL   Borderline High:  130-159 mg/dL   High:  160-189 mg/dL   Very High:  >= 190 mg/dL    Non HDL Cholesterol  Desirable:  130 mg/dL  Above Desirable:  130-159 mg/dL  Borderline High:  160-189 mg/dL  High:  190-219 mg/dL  Very High:  Greater than or equal to 220 mg/dL   Albumin Random Urine Quantitative with Creat Ratio   Result Value Ref Range    Creatinine Urine mg/dL 155.6 mg/dL      Comment:      The reference ranges have not been established in urine creatinine. The results should be integrated into the clinical context for interpretation.    Albumin Urine mg/L 49.5 mg/L      Comment:      The reference ranges have not been established in urine albumin. The results should be integrated into the clinical context for interpretation.    Albumin Urine mg/g Cr 31.81 (H) 0.00 - 17.00 mg/g Cr      Comment:       Microalbuminuria is defined as an albumin:creatinine ratio of 17 to 299 for males and 25 to 299 for females. A ratio of albumin:creatinine of 300 or higher is indicative of overt proteinuria.  Due to biologic variability, positive results should be confirmed by a second, first-morning random or 24-hour timed urine specimen. If there is discrepancy, a third specimen is recommended. When 2 out of 3 results are in the microalbuminuria range, this is evidence for incipient nephropathy and warrants increased efforts at glucose control, blood pressure control, and institution of therapy with an angiotensin-converting-enzyme (ACE) inhibitor (if the patient can tolerate it).     BASIC METABOLIC PANEL   Result Value Ref Range    Sodium 140 135 - 145 mmol/L      Comment:      Reference intervals for this test were updated on 09/26/2023 to more accurately reflect our healthy population. There may be differences in the flagging of prior results with similar values performed with this method. Interpretation of those prior results can be made in the context of the updated reference intervals.     Potassium 4.4 3.4 - 5.3 mmol/L    Chloride 102 98 - 107 mmol/L    Carbon Dioxide (CO2) 27 22 - 29 mmol/L    Anion Gap 11 7 - 15 mmol/L    Urea Nitrogen 16.7 8.0 - 23.0 mg/dL    Creatinine 1.38 (H) 0.67 - 1.17 mg/dL    GFR Estimate 54 (L) >60 mL/min/1.73m2    Calcium 9.4 8.8 - 10.2 mg/dL    Glucose 155 (H) 70 - 99 mg/dL   Hemoglobin   Result Value Ref Range    Hemoglobin 15.0 13.3 - 17.7 g/dL       If you have any questions or concerns, please call the clinic at the number listed above.       Sincerely,      Thomas Bosch MD

## 2024-04-23 NOTE — PROGRESS NOTES
Assessment & Plan           Need for vaccination against respiratory syncytial virus      Essential hypertension with goal blood pressure less than 140/90    - BASIC METABOLIC PANEL; Future  - lovastatin (MEVACOR) 40 MG tablet; Take 1.5 tablets (60 mg) by mouth at bedtime for 90 days TAKE 1 & 1/2 (ONE & ONE-HALF) TABLETS BY MOUTH AT BEDTIME  - metoprolol tartrate (LOPRESSOR) 100 MG tablet; Take 1 tablet (100 mg) by mouth 2 times daily  - BASIC METABOLIC PANEL    - Aldosterone; Future  - Renin activity; Future  - Aldosterone Renin Ratio; Future  - amLODIPine (NORVASC) 10 MG tablet; Take 1 tablet (10 mg) by mouth daily  - lisinopril (ZESTRIL) 10 MG tablet; TAKE 1 TABLET BY MOUTH ONCE DAILY WILL  NOT  BE  ALLOWED  ANY  MORE  REFILLS  WITHOUT  A  CLINIC  APPOINTMENT  - Aldosterone Renin Ratio  Simple chronic bronchitis (H)    - ipratropium - albuterol 0.5 mg/2.5 mg/3 mL (DUONEB) 0.5-2.5 (3) MG/3ML neb solution; Take 1 vial (3 mLs) by nebulization every 4 hours as needed for shortness of breath or wheezing          Sleep disorder    - amitriptyline (ELAVIL) 10 MG tablet; 1-3 tabs at HS        Type 2 diabetes, HbA1c goal < 7% (H)    - HEMOGLOBIN A1C; Future  - Lipid panel reflex to direct LDL Non-fasting; Future  - Albumin Random Urine Quantitative with Creat Ratio; Future  - Hemoglobin; Future  - ACE/ARB/ARNI NOT PRESCRIBED (INTENTIONAL); 1 each daily ACE & ARB not prescribed due to Intolerance  - amitriptyline (ELAVIL) 10 MG tablet; 1-3 tabs at HS  - blood glucose (ACCU-CHEK WENDY) test strip; Use to test 3 times daily before meals and bedtime.  - blood glucose calibration (NO BRAND SPECIFIED) solution; 1 drop by In Vitro route every 30 days WENDY  - glyBURIDE-metFORMIN (GLUCOVANCE) 5-500 MG tablet; TAKE 1 TABLET BY MOUTH TWICE DAILY WITH MEALS  - HEMOGLOBIN A1C  - Lipid panel reflex to direct LDL Non-fasting  - Albumin Random Urine Quantitative with Creat Ratio  - Hemoglobin    Hyperlipidemia LDL goal <100    -  "lovastatin (MEVACOR) 40 MG tablet; Take 1.5 tablets (60 mg) by mouth at bedtime for 90 days TAKE 1 & 1/2 (ONE & ONE-HALF) TABLETS BY MOUTH AT BEDTIME  - metoprolol tartrate (LOPRESSOR) 100 MG tablet; Take 1 tablet (100 mg) by mouth 2 times daily    Major depressive disorder, recurrent episode, mild (H24)    - PARoxetine (PAXIL) 10 MG tablet; TAKE 1 TABLET BY MOUTH ONCE DAILY TAKE  ALONG  WITH  20MG  TABLET  - PARoxetine (PAXIL) 20 MG tablet; Take 1 tablet (20 mg) by mouth every morning    Mixed simple and mucopurulent chronic bronchitis (H)  Has not seen pulmonology in some time he is not on a controller med so I do think is appropriate because he does have shortness of breath that limits his activity.  Do want a second opinion see what appropriate controller medication beyond daily.  - Adult Pulmonary Medicine  Referral; Future        BMI  Estimated body mass index is 31.63 kg/m  as calculated from the following:    Height as of this encounter: 1.825 m (5' 11.85\").    Weight as of this encounter: 105.3 kg (232 lb 4 oz).           Anayeli Barker is a 73 year old, presenting for the following health issues:  Follow Up (diabetes)      4/23/2024     8:05 AM   Additional Questions   Roomed by Vera     History of Present Illness       Diabetes:   He presents for follow up of diabetes.    He is not checking blood glucose.        He is concerned about other.    He is not experiencing numbness or burning in feet, excessive thirst, blurry vision, weight changes or redness, sores or blisters on feet.           He eats 2-3 servings of fruits and vegetables daily.He consumes 0 sweetened beverage(s) daily.He exercises with enough effort to increase his heart rate 9 or less minutes per day.  He exercises with enough effort to increase his heart rate 3 or less days per week.   He is taking medications regularly.       Diabetes Follow-up    How often are you checking your blood sugar? Not at all  What concerns " "do you have today about your diabetes? None   Do you have any of these symptoms? (Select all that apply)  No numbness or tingling in feet.  No redness, sores or blisters on feet.  No complaints of excessive thirst.  No reports of blurry vision.  No significant changes to weight.          Hyperlipidemia Follow-Up    Are you regularly taking any medication or supplement to lower your cholesterol?   Yes-    Are you having muscle aches or other side effects that you think could be caused by your cholesterol lowering medication?  No    Hypertension Follow-up    Do you check your blood pressure regularly outside of the clinic? No   Are you following a low salt diet? No  Are your blood pressures ever more than 140 on the top number (systolic) OR more   than 90 on the bottom number (diastolic), for example 140/90? Yes    BP Readings from Last 2 Encounters:   04/23/24 (!) 142/84   05/02/23 (!) 170/100     Hemoglobin A1C (%)   Date Value   04/06/2023 7.2 (H)   09/30/2021 6.6 (H)   05/06/2020 7.5 (H)   04/09/2019 6.6 (H)     LDL Cholesterol Calculated (mg/dL)   Date Value   04/06/2023 98   09/30/2021 81   05/06/2020 82   04/04/2019 76           Review of Systems  Constitutional, HEENT, cardiovascular, pulmonary, gi and gu systems are negative, except as otherwise noted.      Objective    BP (!) 142/84   Pulse 67   Temp 97.2  F (36.2  C) (Temporal)   Resp 18   Ht 1.825 m (5' 11.85\")   Wt 105.3 kg (232 lb 4 oz)   SpO2 97%   BMI 31.63 kg/m    Body mass index is 31.63 kg/m .  Physical Exam   GENERAL: alert and no distress  NECK: no adenopathy, no asymmetry, masses, or scars  RESP: lungs clear to auscultation - no rales, rhonchi or wheezes  CV: regular rate and rhythm, normal S1 S2, no S3 or S4, no murmur, click or rub, no peripheral edema  MS: no gross musculoskeletal defects noted, no edema    Results for orders placed or performed in visit on 04/23/24 (from the past 24 hour(s))   HEMOGLOBIN A1C   Result Value Ref Range    " Hemoglobin A1C 7.9 (H) <5.7 %   Lipid panel reflex to direct LDL Non-fasting   Result Value Ref Range    Cholesterol 158 <200 mg/dL    Triglycerides 151 (H) <150 mg/dL    Direct Measure HDL 40 >=40 mg/dL    LDL Cholesterol Calculated 88 <=100 mg/dL    Non HDL Cholesterol 118 <130 mg/dL    Patient Fasting > 8hrs? Yes     Narrative    Cholesterol  Desirable:  <200 mg/dL    Triglycerides  Normal:  Less than 150 mg/dL  Borderline High:  150-199 mg/dL  High:  200-499 mg/dL  Very High:  Greater than or equal to 500 mg/dL    Direct Measure HDL  Female:  Greater than or equal to 50 mg/dL   Male:  Greater than or equal to 40 mg/dL    LDL Cholesterol  Desirable:  <100mg/dL  Above Desirable:  100-129 mg/dL   Borderline High:  130-159 mg/dL   High:  160-189 mg/dL   Very High:  >= 190 mg/dL    Non HDL Cholesterol  Desirable:  130 mg/dL  Above Desirable:  130-159 mg/dL  Borderline High:  160-189 mg/dL  High:  190-219 mg/dL  Very High:  Greater than or equal to 220 mg/dL   BASIC METABOLIC PANEL   Result Value Ref Range    Sodium 140 135 - 145 mmol/L    Potassium 4.4 3.4 - 5.3 mmol/L    Chloride 102 98 - 107 mmol/L    Carbon Dioxide (CO2) 27 22 - 29 mmol/L    Anion Gap 11 7 - 15 mmol/L    Urea Nitrogen 16.7 8.0 - 23.0 mg/dL    Creatinine 1.38 (H) 0.67 - 1.17 mg/dL    GFR Estimate 54 (L) >60 mL/min/1.73m2    Calcium 9.4 8.8 - 10.2 mg/dL    Glucose 155 (H) 70 - 99 mg/dL   Hemoglobin   Result Value Ref Range    Hemoglobin 15.0 13.3 - 17.7 g/dL   Aldosterone Renin Ratio    Narrative    The following orders were created for panel order Aldosterone Renin Ratio.  Procedure                               Abnormality         Status                     ---------                               -----------         ------                     Aldosterone[[id number]]                                      In process                 Renin activity[[id number]]                                   In process                 Aldosterone Renin Ratio[[id number]]                           In process                   Please view results for these tests on the individual orders.   Albumin Random Urine Quantitative with Creat Ratio   Result Value Ref Range    Creatinine Urine mg/dL 155.6 mg/dL    Albumin Urine mg/L 49.5 mg/L    Albumin Urine mg/g Cr 31.81 (H) 0.00 - 17.00 mg/g Cr           Signed Electronically by: Thomas Bosch MD, MD

## 2024-04-23 NOTE — Clinical Note
Future Appointments 5/28/2024  10:30 AM   [facility]     PERCY JACKSON 5/28/2024  12:30 PM   Wilder Cee* [facility] 6/25/2024  9:40 AM    Edgar Jeff MD   [facility]

## 2024-04-24 LAB — ALDOST SERPL-MCNC: 16.2 NG/DL (ref 0–31)

## 2024-04-25 LAB — RENIN PLAS-CCNC: 0.4 NG/ML/HR

## 2024-05-01 LAB — ALDOST/RENIN PLAS-RTO: 40.5 {RATIO} (ref 0–25)

## 2024-05-06 ENCOUNTER — MYC MEDICAL ADVICE (OUTPATIENT)
Dept: FAMILY MEDICINE | Facility: CLINIC | Age: 74
End: 2024-05-06
Payer: COMMERCIAL

## 2024-05-06 DIAGNOSIS — I10 HYPERTENSION GOAL BP (BLOOD PRESSURE) < 140/90: ICD-10-CM

## 2024-05-10 ENCOUNTER — OFFICE VISIT (OUTPATIENT)
Dept: FAMILY MEDICINE | Facility: CLINIC | Age: 74
End: 2024-05-10
Payer: COMMERCIAL

## 2024-05-10 VITALS
OXYGEN SATURATION: 92 % | HEART RATE: 70 BPM | SYSTOLIC BLOOD PRESSURE: 122 MMHG | DIASTOLIC BLOOD PRESSURE: 70 MMHG | HEIGHT: 72 IN | TEMPERATURE: 97.2 F | RESPIRATION RATE: 14 BRPM | WEIGHT: 230.5 LBS | BODY MASS INDEX: 31.22 KG/M2

## 2024-05-10 DIAGNOSIS — Z23 NEED FOR SHINGLES VACCINE: ICD-10-CM

## 2024-05-10 DIAGNOSIS — E11.29 TYPE 2 DIABETES MELLITUS WITH OTHER KIDNEY COMPLICATION, UNSPECIFIED WHETHER LONG TERM INSULIN USE (H): ICD-10-CM

## 2024-05-10 DIAGNOSIS — E78.5 HYPERLIPIDEMIA LDL GOAL <100: ICD-10-CM

## 2024-05-10 DIAGNOSIS — E11.9 TYPE 2 DIABETES, HBA1C GOAL < 7% (H): ICD-10-CM

## 2024-05-10 DIAGNOSIS — L98.9 SKIN LESION: ICD-10-CM

## 2024-05-10 DIAGNOSIS — I10 ESSENTIAL HYPERTENSION WITH GOAL BLOOD PRESSURE LESS THAN 140/90: Primary | ICD-10-CM

## 2024-05-10 DIAGNOSIS — Z29.11 NEED FOR VACCINATION AGAINST RESPIRATORY SYNCYTIAL VIRUS: ICD-10-CM

## 2024-05-10 DIAGNOSIS — J41.0 SIMPLE CHRONIC BRONCHITIS (H): ICD-10-CM

## 2024-05-10 PROCEDURE — 99214 OFFICE O/P EST MOD 30 MIN: CPT | Performed by: STUDENT IN AN ORGANIZED HEALTH CARE EDUCATION/TRAINING PROGRAM

## 2024-05-10 PROCEDURE — 99207 PR FOOT EXAM NO CHARGE: CPT | Performed by: STUDENT IN AN ORGANIZED HEALTH CARE EDUCATION/TRAINING PROGRAM

## 2024-05-10 PROCEDURE — G2211 COMPLEX E/M VISIT ADD ON: HCPCS | Performed by: STUDENT IN AN ORGANIZED HEALTH CARE EDUCATION/TRAINING PROGRAM

## 2024-05-10 RX ORDER — RESPIRATORY SYNCYTIAL VIRUS VACCINE 120MCG/0.5
0.5 KIT INTRAMUSCULAR ONCE
Qty: 1 EACH | Refills: 0 | Status: CANCELLED | OUTPATIENT
Start: 2024-05-10 | End: 2024-05-10

## 2024-05-10 NOTE — PROGRESS NOTES
"  Assessment & Plan   Problem List Items Addressed This Visit          Respiratory    Simple chronic bronchitis (H)       Endocrine    Hyperlipidemia LDL goal <100    Diabetes mellitus, type 2 (H)    Relevant Medications    blood glucose (ACCU-CHEK WENDY) test strip    Other Relevant Orders    FOOT EXAM (Completed)       Circulatory    Essential hypertension with goal blood pressure less than 140/90 - Primary     Other Visit Diagnoses       Need for shingles vaccine        Need for vaccination against respiratory syncytial virus        Skin lesion        Type 2 diabetes, HbA1c goal < 7% (H)        Relevant Medications    blood glucose (ACCU-CHEK WENDY) test strip           Recent labs reviewed and implication of elevated aldosterone levels. Hx reviewed for establishing care. Blood pressures stable and will continue to monitor at home. Mood doing well now and continue current medications. Discussed his elevated sugars and will monitor at home repeat A1c in 3 months. On aspirin and statin and side effects of aspirin reviewed. Does have obstructive lung disease and follow up with pulmonology and PFTs upcoming.     BMI  Estimated body mass index is 31.22 kg/m  as calculated from the following:    Height as of this encounter: 1.83 m (6' 0.05\").    Weight as of this encounter: 104.6 kg (230 lb 8 oz).   Weight management plan: Discussed healthy diet and exercise guidelines  Blood sugar testing frequency justification:  Patient modifying lifestyle changes (diet, exercise) with blood sugars        Subjective   Mj is a 73 year old, presenting for the following health issues:  Diabetes        5/10/2024    10:32 AM   Additional Questions   Roomed by Krysten morrison     History of Present Illness       Diabetes:   He presents for follow up of diabetes.  He is checking home blood glucose three times daily.   He checks blood glucose before meals.  Blood glucose is never over 200 and never under 70. He is aware of hypoglycemia " "symptoms including shakiness.    He has no concerns regarding his diabetes at this time.  He is having weight gain.            Reason for visit:  Follow up    He eats 0-1 servings of fruits and vegetables daily.He consumes 1 sweetened beverage(s) daily.He exercises with enough effort to increase his heart rate 10 to 19 minutes per day.  He exercises with enough effort to increase his heart rate 3 or less days per week.   He is taking medications regularly.           Review of Systems  Constitutional, HEENT, cardiovascular, pulmonary, GI, , musculoskeletal, neuro, skin, endocrine and psych systems are negative, except as otherwise noted.      Objective    /70   Pulse 70   Temp 97.2  F (36.2  C)   Resp 14   Ht 1.83 m (6' 0.05\")   Wt 104.6 kg (230 lb 8 oz)   SpO2 92%   BMI 31.22 kg/m    Body mass index is 31.22 kg/m .  Physical Exam   GENERAL: alert and no distress  EYES: Eyes grossly normal to inspection, PERRL and conjunctivae and sclerae normal  HENT: nose and mouth without ulcers or lesions  NECK: no adenopathy, no asymmetry, masses, or scars  RESP: lungs clear to auscultation - no rales, rhonchi or wheezes  CV: regular rate and rhythm, normal S1 S2, no S3 or S4, no murmur, click or rub, no peripheral edema  ABDOMEN: soft, nontender, no hepatosplenomegaly, no masses and bowel sounds normal  MS: no gross musculoskeletal defects noted, no edema, sensation intact of feet, no wounds.   SKIN: no suspicious lesions or rashes  NEURO: mentation intact and speech normal  PSYCH: mentation appears normal, affect normal/bright          Signed Electronically by: Edgar Jeff MD    "

## 2024-05-21 RX ORDER — AMLODIPINE BESYLATE 10 MG/1
10 TABLET ORAL DAILY
Qty: 90 TABLET | Refills: 3 | OUTPATIENT
Start: 2024-05-21

## 2024-05-25 ENCOUNTER — HEALTH MAINTENANCE LETTER (OUTPATIENT)
Age: 74
End: 2024-05-25

## 2024-05-28 ENCOUNTER — OFFICE VISIT (OUTPATIENT)
Dept: PULMONOLOGY | Facility: CLINIC | Age: 74
End: 2024-05-28
Attending: FAMILY MEDICINE
Payer: COMMERCIAL

## 2024-05-28 ENCOUNTER — HOSPITAL ENCOUNTER (OUTPATIENT)
Dept: RESPIRATORY THERAPY | Facility: CLINIC | Age: 74
Discharge: HOME | End: 2024-05-28
Attending: FAMILY MEDICINE | Admitting: FAMILY MEDICINE
Payer: MEDICARE

## 2024-05-28 VITALS
SYSTOLIC BLOOD PRESSURE: 112 MMHG | BODY MASS INDEX: 31.26 KG/M2 | OXYGEN SATURATION: 92 % | HEIGHT: 72 IN | HEART RATE: 66 BPM | WEIGHT: 230.8 LBS | DIASTOLIC BLOOD PRESSURE: 60 MMHG | TEMPERATURE: 97 F

## 2024-05-28 DIAGNOSIS — J41.8 MIXED SIMPLE AND MUCOPURULENT CHRONIC BRONCHITIS (H): ICD-10-CM

## 2024-05-28 DIAGNOSIS — J44.9 CHRONIC OBSTRUCTIVE PULMONARY DISEASE, UNSPECIFIED COPD TYPE (H): Primary | ICD-10-CM

## 2024-05-28 DIAGNOSIS — Z87.891 PERSONAL HISTORY OF TOBACCO USE: Primary | ICD-10-CM

## 2024-05-28 PROCEDURE — 94729 DIFFUSING CAPACITY: CPT

## 2024-05-28 PROCEDURE — 999N000156 HC STATISTIC RCP CONSULT EA 30 MIN

## 2024-05-28 PROCEDURE — G0296 VISIT TO DETERM LDCT ELIG: HCPCS | Performed by: INTERNAL MEDICINE

## 2024-05-28 PROCEDURE — 94060 EVALUATION OF WHEEZING: CPT

## 2024-05-28 PROCEDURE — 999N000157 HC STATISTIC RCP TIME EA 10 MIN

## 2024-05-28 PROCEDURE — 99204 OFFICE O/P NEW MOD 45 MIN: CPT | Performed by: INTERNAL MEDICINE

## 2024-05-28 PROCEDURE — 94726 PLETHYSMOGRAPHY LUNG VOLUMES: CPT

## 2024-05-28 RX ORDER — PREDNISONE 20 MG/1
40 TABLET ORAL DAILY
Qty: 10 TABLET | Refills: 0 | Status: SHIPPED | OUTPATIENT
Start: 2024-05-28 | End: 2024-06-02

## 2024-05-28 ASSESSMENT — PAIN SCALES - GENERAL: PAINLEVEL: NO PAIN (0)

## 2024-05-28 NOTE — PATIENT INSTRUCTIONS
Take the prednisone for 5 days, then call us to let us know if it made a difference for your breathing.      Lung Cancer Screening   Frequently Asked Questions  If you are at high-risk for lung cancer, getting screened with low-dose computed tomography (LDCT) every year can help save your life. This handout offers answers to some of the most common questions about lung cancer screening. If you have other questions, please call [phone] ([phone]).      What is it?  Lung cancer screening uses special X-ray technology to create an image of your lung tissue. The exam is quick and easy and takes less than 10 seconds. We don t give you any medicine or use any needles. You can eat before and after the exam. You don t need to change your clothes as long as the clothing on your chest doesn t contain metal. But, you do need to be able to hold your breath for at least 6 seconds during the exam.    What is the goal of lung cancer screening?  The goal of lung cancer screening is to save lives. Many times, lung cancer is not found until a person starts having physical symptoms. Lung cancer screening can help detect lung cancer in the earliest stages when it may be easier to treat.    Who should be screened for lung cancer?  We suggest lung cancer screening for anyone who is at high-risk for lung cancer. You are in the high-risk group if you:     are between the ages of 55 and 79, and   have smoked at least 1 pack of cigarettes a day for 20 or more years, and   still smoke or have quit within the past 15 years.    However, if you have a new cough or shortness of breath, you should talk to your doctor before being screened.    Why does it matter if I have symptoms?  Certain symptoms can be a sign that you have a condition in your lungs that should be checked and treated by your doctor. These symptoms include fever, chest pain, a new or changing cough, shortness of breath that you have never felt before, coughing up  blood or unexplained weight loss. Having any of these symptoms can greatly affect the results of lung cancer screening.       Should all smokers get an LDCT lung cancer screening exam?  It depends. Lung cancer screening is for a very specific group of men and women who have a history of heavy smoking over a long period of time (see  Who should be screened for lung cancer  above).  I am in the high-risk group, but have been diagnosed with cancer in the past. Is LDCT lung cancer screening right for me?  In some cases, you should not have LDCT lung screening, such as when your doctor is already following your cancer with CT scan studies. Your doctor will help you decide if LDCT lung screening is right for you.  Do I need to have a screening exam every year?  Yes. If you are in the high-risk group described earlier, you should get an LDCT lung cancer screening exam every year until you are 79, or are no longer willing or able to undergo screening and possible procedures to diagnose and treat lung cancer.  How effective is LDCT at preventing death from lung cancer?  Studies have shown that LDCT lung cancer screening can lower the risk of death from lung cancer by 20 percent in people who are at high-risk.  What are the risks?  There are some risks and limitations of LDCT lung cancer screening. We want to make sure you understand the risks and benefits, so please let us know if you have any questions. Your doctor may want to talk with you more about these risks.   Radiation exposure: As with any exam that uses radiation, there is a very small increased risk of cancer. The amount of radiation in LDCT is small--about the same amount a person would get from a mammogram. Your doctor orders the exam when he or she feels the potential benefits outweigh the risks.   False negatives: No test is perfect, including LDCT. It is possible that you may have a medical condition, including lung cancer, that is not found during your exam.  This is called a false negative result.   False positives and more testing: LDCT very often finds something in the lung that could be cancer, but in fact is not. This is called a false positive result. False positive tests often cause anxiety. To make sure these findings are not cancer, you may need to have more tests. These tests will be done only if you give us permission. Sometimes patients need a treatment that can have side effects, such as a biopsy. For more information on false positives, see  What can I expect from the results?    Findings not related to lung cancer: Your LDCT exam also takes pictures of areas of your body next to your lungs. In a very small number of cases, the CT scan will show an abnormal finding in one of these areas, such as your kidneys, adrenal glands, liver or thyroid. This finding may not be serious, but you may need more tests. Your doctor can help you decide what other tests you may need, if any.  What can I expect from the results?  About 1 out of 4 LDCT exams will find something that may need more tests. Most of the time, these findings are lung nodules. Lung nodules are very small collections of tissue in the lung. These nodules are very common, and the vast majority--more than 97 percent--are not cancer (benign). Most are normal lymph nodes or small areas of scarring from past infections.  But, if a small lung nodule is found to be cancer, the cancer can be cured more than 90 percent of the time. To know if the nodule is cancer, we may need to get more images before your next yearly screening exam. If the nodule has suspicious features (for example, it is large, has an odd shape or grows over time), we will refer you to a specialist for further testing.  Will my doctor also get the results?  Yes. Your doctor will get a copy of your results.  Is it okay to keep smoking now that there s a cancer screening exam?  No. Tobacco is one of the strongest cancer-causing agents. It  causes not only lung cancer, but other cancers and cardiovascular (heart) diseases as well. The damage caused by smoking builds over time. This means that the longer you smoke, the higher your risk of disease. While it is never too late to quit, the sooner you quit, the better.  Where can I find help to quit smoking?  The best way to prevent lung cancer is to stop smoking. If you have already quit smoking, congratulations and keep it up! For help on quitting smoking, please call [employer] at 3-857-QUITNOW (1-922.267.8103) or the American [employer] Society at 1-278.769.1691 to find local resources near you.  One-on-one health coaching:  If you d prefer to work individually with a health care provider on tobacco cessation, we offer:     Medication Therapy Management:  Our specially trained pharmacists work closely with you and your doctor to help you quit smoking.  Call [phone] or [phone] (toll free).
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
## 2024-05-28 NOTE — DISCHARGE INSTRUCTIONS
Thank you for completing pulmonary function testing today.  All results will be scanned into your epic results for your doctor to review.  Please resume taking all your current prescribed medications and diet as directed by your provider.   If you have not heard from your provider about your testing within two weeks and do not have a follow-up appointment scheduled with them please contact your provider about any questions you have concerning your testing.   Thank you  [facility] Pulmonary Function [facility]

## 2024-05-30 LAB
DLCOCOR-%PRED-PRE: 69 %
DLCOCOR-PRE: 18.49 ML/MIN/MMHG
DLCOUNC-%PRED-PRE: 70 %
DLCOUNC-PRE: 18.7 ML/MIN/MMHG
DLCOUNC-PRED: 26.6 ML/MIN/MMHG
ERV-%PRED-PRE: 12 %
ERV-PRE: 0.19 L
ERV-PRED: 1.51 L
EXPTIME-PRE: 5.74 SEC
FEF2575-%PRED-POST: 60 %
FEF2575-%PRED-PRE: 35 %
FEF2575-POST: 1.35 L/SEC
FEF2575-PRE: 0.78 L/SEC
FEF2575-PRED: 2.22 L/SEC
FEFMAX-%PRED-PRE: 36 %
FEFMAX-PRE: 3 L/SEC
FEFMAX-PRED: 8.31 L/SEC
FEV1-%PRED-PRE: 42 %
FEV1-PRE: 1.27 L
FEV1FEV6-PRE: 64 %
FEV1FEV6-PRED: 77 %
FEV1FVC-PRE: 64 %
FEV1FVC-PRED: 76 %
FEV1SVC-PRE: 57 %
FEV1SVC-PRED: 71 %
FIFMAX-PRE: 1.09 L/SEC
FRCPLETH-%PRED-PRE: 98 %
FRCPLETH-PRE: 3.82 L
FRCPLETH-PRED: 3.86 L
FVC-%PRED-PRE: 49 %
FVC-PRE: 1.99 L
FVC-PRED: 4.01 L
IC-%PRED-PRE: 68 %
IC-PRE: 2.06 L
IC-PRED: 3.03 L
RVPLETH-%PRED-PRE: 129 %
RVPLETH-PRE: 3.63 L
RVPLETH-PRED: 2.81 L
TLCPLETH-%PRED-PRE: 78 %
TLCPLETH-PRE: 5.88 L
TLCPLETH-PRED: 7.53 L
VA-%PRED-PRE: 57 %
VA-PRE: 3.93 L
VC-%PRED-PRE: 52 %
VC-PRE: 2.25 L
VC-PRED: 4.28 L

## 2024-06-02 ASSESSMENT — ENCOUNTER SYMPTOMS
WEIGHT LOSS: 0
HEMOPTYSIS: 0
FEVER: 0
WHEEZING: 0
SPUTUM PRODUCTION: 1
SHORTNESS OF BREATH: 1
CHILLS: 0
COUGH: 1

## 2024-06-06 ENCOUNTER — HOSPITAL ENCOUNTER (OUTPATIENT)
Dept: CT IMAGING | Facility: CLINIC | Age: 74
Discharge: HOME | End: 2024-06-06
Attending: INTERNAL MEDICINE | Admitting: INTERNAL MEDICINE
Payer: MEDICARE

## 2024-06-06 DIAGNOSIS — Z87.891 PERSONAL HISTORY OF TOBACCO USE: ICD-10-CM

## 2024-06-06 PROCEDURE — 71271 CT THORAX LUNG CANCER SCR C-: CPT

## 2024-07-09 ENCOUNTER — OFFICE VISIT (OUTPATIENT)
Dept: FAMILY MEDICINE | Facility: CLINIC | Age: 74
End: 2024-07-09
Payer: COMMERCIAL

## 2024-07-09 VITALS
WEIGHT: 227 LBS | SYSTOLIC BLOOD PRESSURE: 130 MMHG | HEIGHT: 72 IN | BODY MASS INDEX: 30.75 KG/M2 | RESPIRATION RATE: 18 BRPM | TEMPERATURE: 97.3 F | DIASTOLIC BLOOD PRESSURE: 60 MMHG | HEART RATE: 72 BPM | OXYGEN SATURATION: 95 %

## 2024-07-09 DIAGNOSIS — J41.0 SIMPLE CHRONIC BRONCHITIS (H): Primary | ICD-10-CM

## 2024-07-09 DIAGNOSIS — E78.5 HYPERLIPIDEMIA LDL GOAL <100: ICD-10-CM

## 2024-07-09 DIAGNOSIS — N18.31 STAGE 3A CHRONIC KIDNEY DISEASE (H): ICD-10-CM

## 2024-07-09 DIAGNOSIS — I10 ESSENTIAL HYPERTENSION WITH GOAL BLOOD PRESSURE LESS THAN 140/90: ICD-10-CM

## 2024-07-09 DIAGNOSIS — N52.9 ERECTILE DYSFUNCTION, UNSPECIFIED ERECTILE DYSFUNCTION TYPE: ICD-10-CM

## 2024-07-09 DIAGNOSIS — E11.29 TYPE 2 DIABETES MELLITUS WITH OTHER KIDNEY COMPLICATION, UNSPECIFIED WHETHER LONG TERM INSULIN USE (H): ICD-10-CM

## 2024-07-09 DIAGNOSIS — Z23 NEED FOR SHINGLES VACCINE: ICD-10-CM

## 2024-07-09 DIAGNOSIS — Z29.11 NEED FOR VACCINATION AGAINST RESPIRATORY SYNCYTIAL VIRUS: ICD-10-CM

## 2024-07-09 DIAGNOSIS — F33.0 MILD RECURRENT MAJOR DEPRESSION (H): ICD-10-CM

## 2024-07-09 PROCEDURE — 99214 OFFICE O/P EST MOD 30 MIN: CPT | Performed by: STUDENT IN AN ORGANIZED HEALTH CARE EDUCATION/TRAINING PROGRAM

## 2024-07-09 PROCEDURE — G2211 COMPLEX E/M VISIT ADD ON: HCPCS | Performed by: STUDENT IN AN ORGANIZED HEALTH CARE EDUCATION/TRAINING PROGRAM

## 2024-07-09 RX ORDER — RESPIRATORY SYNCYTIAL VIRUS VACCINE 120MCG/0.5
0.5 KIT INTRAMUSCULAR ONCE
Qty: 1 EACH | Refills: 0 | Status: CANCELLED | OUTPATIENT
Start: 2024-07-09 | End: 2024-07-09

## 2024-07-09 ASSESSMENT — PATIENT HEALTH QUESTIONNAIRE - PHQ9: SUM OF ALL RESPONSES TO PHQ QUESTIONS 1-9: 4

## 2024-07-09 NOTE — PROGRESS NOTES
Assessment & Plan   Problem List Items Addressed This Visit          Respiratory    Simple chronic bronchitis (H) - Primary       Endocrine    Hyperlipidemia LDL goal <100    Diabetes mellitus, type 2 (H)    Relevant Orders    Hemoglobin A1c       Circulatory    Essential hypertension with goal blood pressure less than 140/90       Behavioral    Mild recurrent major depression (H24)       Other    Erectile dysfunction     Other Visit Diagnoses       Need for shingles vaccine        Need for vaccination against respiratory syncytial virus        Stage 3a chronic kidney disease (H)                 Bleeding stable with obstructive pattern on PFTs and following with pulmonology.  Using DuoNebs as needed and feels like things are going well.  We did discuss trial maintenance inhaler if worsening symptoms in the future.  Nodules and calcifications noted in CT with pulmonology.  Continue with follow-up screening.  We did discuss incidental findings as well as benign adenoma adrenal as well as mildly elevated aldosterone findings.  Blood pressure stable and well-controlled with current medications.  No changes now but implications discussed and consider repeat aldosterone and renin levels moving forward.  Plan to repeat A1c in 1 month with stable blood sugars.  Patient on aspirin and statin as well as ACE     The longitudinal plan of care for the diagnosis(es)/condition(s) as documented were addressed during this visit. Due to the added complexity in care, I will continue to support Mj in the subsequent management and with ongoing continuity of care.     Subjective   Mj is a 73 year old, presenting for the following health issues:  Diabetes        7/9/2024    10:21 AM   Additional Questions   Roomed by Melania DEGROOT MA     [facility]       Diabetes Follow-up    How often are you checking your blood sugar? One time daily  What time of day are you checking your blood sugars (select all that apply)?  Before meals  Have you  had any blood sugars above 200?  No  Have you had any blood sugars below 70?  Yes 47  What symptoms do you notice when your blood sugar is low?  None  What concerns do you have today about your diabetes? None   Do you have any of these symptoms? (Select all that apply)  No numbness or tingling in feet.  No redness, sores or blisters on feet.  No complaints of excessive thirst.  No reports of blurry vision.  No significant changes to weight.      BP Readings from Last 2 Encounters:   07/09/24 130/60   05/28/24 112/60     Hemoglobin A1C (%)   Date Value   04/23/2024 7.9 (H)   04/06/2023 7.2 (H)   05/06/2020 7.5 (H)   04/09/2019 6.6 (H)     LDL Cholesterol Calculated (mg/dL)   Date Value   04/23/2024 88   04/06/2023 98   05/06/2020 82   04/04/2019 76         How many servings of fruits and vegetables do you eat daily?  0-1  On average, how many sweetened beverages do you drink each day (Examples: soda, juice, sweet tea, etc.  Do NOT count diet or artificially sweetened beverages)?   0  How many days per week do you exercise enough to make your heart beat faster? 3 or less  How many minutes a day do you exercise enough to make your heart beat faster? 9 or less  How many days per week do you miss taking your medication? 0        Review of Systems  Constitutional, HEENT, cardiovascular, pulmonary, GI, , musculoskeletal, neuro, skin, endocrine and psych systems are negative, except as otherwise noted.      Objective    /60   Pulse 72   Temp 97.3  F (36.3  C) (Temporal)   Resp 18   Ht 1.829 m (6')   Wt 103 kg (227 lb)   SpO2 95%   BMI 30.79 kg/m    Body mass index is 30.79 kg/m .  Physical Exam   GENERAL: alert and no distress  EYES: Eyes grossly normal to inspection, PERRL and conjunctivae and sclerae normal  HENT:  nose and mouth without ulcers or lesions  NECK: no adenopathy, no asymmetry, masses, or scars  RESP: lungs clear to auscultation - no rales, rhonchi or wheezes  CV: regular rate and rhythm,  normal S1 S2, no S3 or S4, no murmur, click or rub, no peripheral edema  MS: no gross musculoskeletal defects noted, no edema  SKIN: no suspicious lesions or rashes  NEURO: Normal strength and tone, mentation intact and speech normal  PSYCH: mentation appears normal, affect normal/bright          Signed Electronically by: Edgar Jeff MD

## 2024-07-19 DIAGNOSIS — I10 ESSENTIAL HYPERTENSION WITH GOAL BLOOD PRESSURE LESS THAN 140/90: ICD-10-CM

## 2024-07-19 DIAGNOSIS — E78.5 HYPERLIPIDEMIA LDL GOAL <100: ICD-10-CM

## 2024-07-19 RX ORDER — LOVASTATIN 40 MG
TABLET ORAL
Qty: 135 TABLET | Refills: 0 | Status: SHIPPED | OUTPATIENT
Start: 2024-07-19

## 2024-07-19 NOTE — TELEPHONE ENCOUNTER
Medication refilled x1.  Office visit due for further refills.  Will have staff notify patient.    Manish Villalpando MD

## 2024-07-23 ENCOUNTER — MYC MEDICAL ADVICE (OUTPATIENT)
Dept: FAMILY MEDICINE | Facility: CLINIC | Age: 74
End: 2024-07-23
Payer: COMMERCIAL

## 2024-07-23 DIAGNOSIS — E78.5 HYPERLIPIDEMIA LDL GOAL <100: ICD-10-CM

## 2024-07-23 DIAGNOSIS — I10 ESSENTIAL HYPERTENSION WITH GOAL BLOOD PRESSURE LESS THAN 140/90: ICD-10-CM

## 2024-07-23 RX ORDER — LOVASTATIN 40 MG
TABLET ORAL
Qty: 135 TABLET | Refills: 0 | OUTPATIENT
Start: 2024-07-23

## 2024-07-23 NOTE — TELEPHONE ENCOUNTER
Patient has been notified of the note below via [employer]. Reminder set for 3 days to send letter if not read.

## 2024-07-23 NOTE — LETTER
[address] [address]  Phone: [phone]  Fax: [phone]      July 26, 2024      Mj Ruth                                                                                                                                [id number]  [facility] [address]        Dear Mr. Ruth,    We are concerned about your health care.  We recently provided you with a medication refill.  Many medications require routine follow-up with your Doctor.      At this time we ask that: You schedule a routine office visit with your physician to follow your medications.  Call the clinic at [phone] to schedule.     Your prescription:  Has been filled. Please schedule a follow up visit for first available appointment. Courtesy refills will be given if needed until your scheduled appointment.       Thank you   [facility] Care Team   [phone]

## 2024-08-20 ENCOUNTER — OFFICE VISIT (OUTPATIENT)
Dept: PULMONOLOGY | Facility: CLINIC | Age: 74
End: 2024-08-20
Attending: INTERNAL MEDICINE
Payer: COMMERCIAL

## 2024-08-20 ENCOUNTER — HOSPITAL ENCOUNTER (OUTPATIENT)
Dept: RESPIRATORY THERAPY | Facility: CLINIC | Age: 74
Discharge: HOME | End: 2024-08-20
Attending: INTERNAL MEDICINE | Admitting: INTERNAL MEDICINE
Payer: MEDICARE

## 2024-08-20 VITALS
BODY MASS INDEX: 30.2 KG/M2 | WEIGHT: 223 LBS | TEMPERATURE: 97 F | HEART RATE: 70 BPM | HEIGHT: 72 IN | SYSTOLIC BLOOD PRESSURE: 138 MMHG | DIASTOLIC BLOOD PRESSURE: 80 MMHG | OXYGEN SATURATION: 94 %

## 2024-08-20 DIAGNOSIS — R09.02 HYPOXIA: Primary | ICD-10-CM

## 2024-08-20 DIAGNOSIS — J41.8 MIXED SIMPLE AND MUCOPURULENT CHRONIC BRONCHITIS (H): ICD-10-CM

## 2024-08-20 DIAGNOSIS — Z87.891 PERSONAL HISTORY OF TOBACCO USE: ICD-10-CM

## 2024-08-20 DIAGNOSIS — J45.30 MILD PERSISTENT ASTHMA WITHOUT COMPLICATION: ICD-10-CM

## 2024-08-20 DIAGNOSIS — R06.09 DYSPNEA ON EXERTION: ICD-10-CM

## 2024-08-20 PROCEDURE — 99215 OFFICE O/P EST HI 40 MIN: CPT | Mod: 25 | Performed by: INTERNAL MEDICINE

## 2024-08-20 PROCEDURE — 94060 EVALUATION OF WHEEZING: CPT

## 2024-08-20 PROCEDURE — 94729 DIFFUSING CAPACITY: CPT

## 2024-08-20 PROCEDURE — 94726 PLETHYSMOGRAPHY LUNG VOLUMES: CPT

## 2024-08-20 RX ORDER — FLUTICASONE FUROATE AND VILANTEROL 200; 25 UG/1; UG/1
1 POWDER RESPIRATORY (INHALATION) DAILY
Qty: 60 EACH | Refills: 4 | Status: SHIPPED | OUTPATIENT
Start: 2024-08-20

## 2024-08-20 ASSESSMENT — ENCOUNTER SYMPTOMS
HEMOPTYSIS: 0
CHILLS: 0
FEVER: 0
WEIGHT LOSS: 0
SHORTNESS OF BREATH: 1
WHEEZING: 0
SPUTUM PRODUCTION: 0
COUGH: 0

## 2024-08-20 NOTE — PATIENT INSTRUCTIONS
Start taking breo inhaler, call us in 3-4 weeks to let us know if there is any improvement in your breathing.

## 2024-08-20 NOTE — PROGRESS NOTES
Breathing ok if he takes it easy, huffing and puffing if moves fast    PCO2 elevated in the past, bicarb elevated    Coughing has resovled    No wheezing even when breahting hard    Not concerned about VELAZQUEZ, just takes it slow    Upper airway restrictive sound on deep insp    Moderate obstruction, mild restriction    Feels liekhe's had to reduce activity due ot breathing     Doesn't remember pred making a difference after his last visit      Concern for VCD, consider end if no imp with inhaler    ECHO    Checked formulary, breo lowest tier    /80   Pulse 70   Temp 97  F (36.1  C) (Temporal)   Ht 1.829 m (6')   Wt 101.2 kg (223 lb)   SpO2 94%   BMI 30.24 kg/m      Overnight oximetry    Restriction could be obesity given low ERV    Clear, rrr
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
## 2024-08-20 NOTE — DISCHARGE INSTRUCTIONS
Thank you for completing pulmonary function testing today.  All results will be scanned into your epic results for your doctor to review.  Please resume taking all your current prescribed medications and diet as directed by your provider.   If you have not heard from your provider about your testing within two weeks and do not have a follow-up appointment scheduled with them please contact your provider about any questions you have concerning your testing.   Thank you  [facility] Pulmonary Function [facility]

## 2024-08-21 NOTE — TELEPHONE ENCOUNTER
[facility] Call Center    Phone Message    May a detailed message be left on voicemail: yes     Reason for Call: Medication Question or concern regarding medication   Prescription Clarification  Name of Medication: Breo Ellipta  Prescribing Provider: Coleman   Pharmacy: [facility] [place]   What on the order needs clarification? Pt did not pick this medication up because it was $350 out-of-pocket for him. Would like to know what Dr. Cee would recommend for him to do instead?      Action Taken: Other: Pulm    Travel Screening: Not Applicable

## 2024-08-21 NOTE — TELEPHONE ENCOUNTER
Breo Ellipta was on patient's formulary; however, out-of-pocket costs are $350. Patient can't afford that, even for one month. Encouraged patient to reach out to his insurance to see if there's a cheaper option on his formulary. Writer will reach out to our pharmacy liaison for assistance.     Britney Steel RN on 8/21/2024 at 9:01 AM

## 2024-08-22 LAB
DLCOUNC-%PRED-PRE: 71 %
DLCOUNC-PRE: 19.08 ML/MIN/MMHG
DLCOUNC-PRED: 26.57 ML/MIN/MMHG
ERV-%PRED-PRE: 28 %
ERV-PRE: 0.42 L
ERV-PRED: 1.51 L
EXPTIME-PRE: 9.43 SEC
FEF2575-%PRED-POST: 54 %
FEF2575-%PRED-PRE: 44 %
FEF2575-POST: 1.21 L/SEC
FEF2575-PRE: 0.99 L/SEC
FEF2575-PRED: 2.21 L/SEC
FEFMAX-%PRED-PRE: 40 %
FEFMAX-PRE: 3.35 L/SEC
FEFMAX-PRED: 8.28 L/SEC
FEV1-%PRED-PRE: 52 %
FEV1-PRE: 1.58 L
FEV1FEV6-PRE: 68 %
FEV1FEV6-PRED: 77 %
FEV1FVC-PRE: 68 %
FEV1FVC-PRED: 76 %
FEV1SVC-PRE: 61 %
FEV1SVC-PRED: 71 %
FIFMAX-PRE: 1.13 L/SEC
FRCPLETH-%PRED-PRE: 91 %
FRCPLETH-PRE: 3.53 L
FRCPLETH-PRED: 3.86 L
FVC-%PRED-PRE: 57 %
FVC-PRE: 2.32 L
FVC-PRED: 4.01 L
IC-%PRED-PRE: 71 %
IC-PRE: 2.16 L
IC-PRED: 3.02 L
RVPLETH-%PRED-PRE: 110 %
RVPLETH-PRE: 3.1 L
RVPLETH-PRED: 2.82 L
TLCPLETH-%PRED-PRE: 75 %
TLCPLETH-PRE: 5.69 L
TLCPLETH-PRED: 7.53 L
VA-%PRED-PRE: 61 %
VA-PRE: 4.17 L
VC-%PRED-PRE: 60 %
VC-PRE: 2.58 L
VC-PRED: 4.27 L

## 2024-08-26 ENCOUNTER — TELEPHONE (OUTPATIENT)
Dept: PULMONOLOGY | Facility: CLINIC | Age: 74
End: 2024-08-26
Payer: COMMERCIAL

## 2024-08-26 RX ORDER — FLUTICASONE PROPIONATE AND SALMETEROL 232; 14 UG/1; UG/1
1 POWDER, METERED RESPIRATORY (INHALATION) 2 TIMES DAILY
Qty: 3 EACH | Refills: 4 | Status: CANCELLED | OUTPATIENT
Start: 2024-08-26

## 2024-08-26 NOTE — TELEPHONE ENCOUNTER
Per Dr. Cee, ok to switch prescription to Airduo 232/14. He also recommended that patient could use GoodRx coupon for discounted cost at [facility] and [facility].     Attempted to call patient, no answer. Left voicemail and requested patient call back at [phone].       Rosaline Roa RN   [facility]

## 2024-08-26 NOTE — TELEPHONE ENCOUNTER
Prior Authorization Not Needed per Insurance    Medication: BREO ELLIPTA 200-25 MCG/ACT IN AEPB  Insurance Company: [employer] - Phone [phone] Fax [phone]  Expected CoPay: $ 392.15  Pharmacy Filling the Rx:    Pharmacy Notified:   Patient Notified:

## 2024-08-26 NOTE — TELEPHONE ENCOUNTER
"The following message was received from our pharmacy liaison:     \"Hi there,    Sorry for the delay on this. I really try to help with cost questions and what not for inhalers and other meds but since they are not considered specialty medications, it's not technically under my purview so they sometimes fall to the back burner - but I have no problem trying when time allows! And my queues have just been crazy lately.    I did run a test claim for Breo and got a co-pay of $392.15. Looking at what insurance sent back, that full amount is going towards his deductible. The deductible is 100% patient responsibility. So unfortunately, his price on any inhalers will not drop through his insurance until he meets his deductible ($545).    It looks like the cheapest option is going to be the generic AirDuo (fluticasone-salmeterol 232-14 (or other strengths)). One inhaler is $112.42.    1. That would be an option if he could afford that.  2. He could look into Medicare Extra Help, there are some income qualifications to meet but it's worth a look. [phone] ask about Extra Help.  3. He it looks like he is established with a  Family Practice so he may qualify for the Pharmacy Prescription Assistance Fund. If he qualifies, he would have to use a  Pharmacy and it would be a one-time shayla of $500 to be used all at once. He can try reaching out to Gladys Milan at [phone]    Hopefully that's helpful. Sorry I couldn't be faster or of more assistance.    Shelbi\"    Patient notified of the above message as written. Patient verbalized understanding and took down applicable phone numbers. He would like to switch to he generic AirDuo (fluticasone-salmeterol 232-14 (or other strengths)) due to cost. This order has been pended. Please review and approve if appropriate.     Rosaline Roa RN   [facility]      "

## 2024-08-26 NOTE — TELEPHONE ENCOUNTER
Prior Authorization Not Needed per Insurance    Medication: FLUTICASONE-SALMETEROL 232-14 MCG/ACT IN AEPB  Insurance Company: [employer] - Phone [phone] Fax [phone]  Expected CoPay: $ 112.42  Pharmacy Filling the Rx:    Pharmacy Notified:   Patient Notified:

## 2024-08-27 NOTE — TELEPHONE ENCOUNTER
Attempt #2:     LVM for patient requesting a call back. Need to see if patient would like to move forward with Airduo.     Britney Steel RN on 8/27/2024 at 8:34 AM

## 2024-08-28 NOTE — TELEPHONE ENCOUNTER
Attempt #3:    Called patient's home and mobile phone without success of reaching patient. Left voicemail on both line requesting a call back to discuss inhaler options.     Closing encounter due 3rd attempt to contact patient.     Britney Steel RN on 8/28/2024 at 8:36 AM

## 2024-08-30 RX ORDER — FLUTICASONE PROPIONATE AND SALMETEROL 232; 14 UG/1; UG/1
1 POWDER, METERED RESPIRATORY (INHALATION) 2 TIMES DAILY
Qty: 1 EACH | Refills: 4 | OUTPATIENT
Start: 2024-08-30

## 2024-08-30 NOTE — TELEPHONE ENCOUNTER
Writer spoke with patient about Dr. Cee's response to the alternative options. Patient ok to switch to Airduo /14 but would like the prescription to be sent to [facility] in [place].    Pended script for provider to review, then sign once it looks good.    Additionally, patient would like help to reschedule 6 minute walk test and lab appointment (he missed since he was at a ). I called the walk test team scheduling line who will reach out to the patient, then patient will call the main scheduling line to coordinate the lab test on the same day.    Karine Chaudhry RN on 2024 at 1:40 PM

## 2024-09-17 ENCOUNTER — HOSPITAL ENCOUNTER (OUTPATIENT)
Dept: RESPIRATORY THERAPY | Facility: CLINIC | Age: 74
Discharge: HOME | End: 2024-09-17
Attending: INTERNAL MEDICINE | Admitting: INTERNAL MEDICINE
Payer: MEDICARE

## 2024-09-17 ENCOUNTER — LAB (OUTPATIENT)
Dept: LAB | Facility: CLINIC | Age: 74
End: 2024-09-17
Payer: COMMERCIAL

## 2024-09-17 VITALS — HEART RATE: 82 BPM | DIASTOLIC BLOOD PRESSURE: 72 MMHG | SYSTOLIC BLOOD PRESSURE: 142 MMHG | OXYGEN SATURATION: 92 %

## 2024-09-17 DIAGNOSIS — E11.29 TYPE 2 DIABETES MELLITUS WITH OTHER KIDNEY COMPLICATION, UNSPECIFIED WHETHER LONG TERM INSULIN USE (H): ICD-10-CM

## 2024-09-17 LAB — HBA1C MFR BLD: 7.5 %

## 2024-09-17 PROCEDURE — 36415 COLL VENOUS BLD VENIPUNCTURE: CPT

## 2024-09-17 PROCEDURE — 94618 PULMONARY STRESS TESTING: CPT

## 2024-09-17 PROCEDURE — 83036 HEMOGLOBIN GLYCOSYLATED A1C: CPT

## 2024-09-17 NOTE — DISCHARGE INSTRUCTIONS
Thank you for completing pulmonary function testing today.  All results will be scanned into your epic results for your doctor to review.  Please resume taking all your current prescribed medications and diet as directed by your provider.   If you have not heard from your provider about your testing within two weeks and do not have a follow-up appointment scheduled with them please contact your provider about any questions you have concerning your testing.   Thank you  [facility] Pulmonary Function Lab

## 2024-10-24 DIAGNOSIS — I10 ESSENTIAL HYPERTENSION WITH GOAL BLOOD PRESSURE LESS THAN 140/90: ICD-10-CM

## 2024-10-24 DIAGNOSIS — E78.5 HYPERLIPIDEMIA LDL GOAL <100: ICD-10-CM

## 2024-10-25 RX ORDER — LOVASTATIN 40 MG/1
TABLET ORAL
Qty: 135 TABLET | Refills: 2 | Status: SHIPPED | OUTPATIENT
Start: 2024-10-25

## 2024-11-19 ENCOUNTER — HOSPITAL ENCOUNTER (OUTPATIENT)
Dept: CARDIOLOGY | Facility: CLINIC | Age: 74
Discharge: HOME | End: 2024-11-19
Attending: INTERNAL MEDICINE
Payer: MEDICARE

## 2024-11-19 DIAGNOSIS — R06.09 DYSPNEA ON EXERTION: ICD-10-CM

## 2024-11-19 DIAGNOSIS — R09.02 HYPOXIA: ICD-10-CM

## 2024-11-19 LAB — LVEF ECHO: NORMAL

## 2024-11-19 PROCEDURE — 93306 TTE W/DOPPLER COMPLETE: CPT

## 2024-11-20 ENCOUNTER — TELEPHONE (OUTPATIENT)
Dept: PULMONOLOGY | Facility: CLINIC | Age: 74
End: 2024-11-20
Payer: COMMERCIAL

## 2024-11-20 NOTE — TELEPHONE ENCOUNTER
Secured message sent to Dr. Cee to determine how he'd like to move forward.     Britney Steel RN on 11/20/2024 at 8:59 AM

## 2024-11-20 NOTE — TELEPHONE ENCOUNTER
Retail Pharmacy Prior Authorization Team   Phone: [phone]    PRIOR AUTHORIZATION DENIED    Medication: FLUTICASONE-SALMETEROL 500-50 MCG/ACT IN AEPB  Insurance Company: [employer] - Phone [phone] Fax [phone]  Denial Date: 11/20/2024  Denial Reason(s): MUST TRY/FAIL FLUTICASONE PROP-SALMETEROL BREATH ACTIVATED POWDER INHALER (GENERIC AIRDUO RESPICLICK)      Appeal Information: IF THE PROVIDER WOULD LIKE TO APPEAL THIS DECISION PLEASE PROVIDE THE PA TEAM WITH A LETTER OF MEDICAL NECESSITY        Patient Notified: NO

## 2024-11-20 NOTE — TELEPHONE ENCOUNTER
Per Dr. Cee-- Nothing to do.  No inhalers are affordable with his insurance so he is good to get it with a coupon.     Closing encounter.    Britney Steel RN on 11/20/2024 at 9:21 AM

## 2024-12-03 DIAGNOSIS — E11.9 TYPE 2 DIABETES, HBA1C GOAL < 7% (H): ICD-10-CM

## 2024-12-10 ENCOUNTER — TRANSFERRED RECORDS (OUTPATIENT)
Dept: HEALTH INFORMATION MANAGEMENT | Facility: CLINIC | Age: 74
End: 2024-12-10
Payer: COMMERCIAL

## 2025-02-25 ENCOUNTER — OFFICE VISIT (OUTPATIENT)
Dept: PULMONOLOGY | Facility: CLINIC | Age: 75
End: 2025-02-25
Payer: COMMERCIAL

## 2025-02-25 VITALS
WEIGHT: 229.6 LBS | SYSTOLIC BLOOD PRESSURE: 124 MMHG | TEMPERATURE: 97.5 F | BODY MASS INDEX: 32.14 KG/M2 | OXYGEN SATURATION: 91 % | HEIGHT: 71 IN | DIASTOLIC BLOOD PRESSURE: 60 MMHG | HEART RATE: 63 BPM | RESPIRATION RATE: 20 BRPM

## 2025-02-25 DIAGNOSIS — Z87.891 PERSONAL HISTORY OF TOBACCO USE: Primary | ICD-10-CM

## 2025-02-25 DIAGNOSIS — J45.40 MODERATE PERSISTENT ASTHMA WITHOUT COMPLICATION: ICD-10-CM

## 2025-02-25 PROCEDURE — 3078F DIAST BP <80 MM HG: CPT | Performed by: INTERNAL MEDICINE

## 2025-02-25 PROCEDURE — 3074F SYST BP LT 130 MM HG: CPT | Performed by: INTERNAL MEDICINE

## 2025-02-25 PROCEDURE — 99214 OFFICE O/P EST MOD 30 MIN: CPT | Mod: 25 | Performed by: INTERNAL MEDICINE

## 2025-02-25 PROCEDURE — G0296 VISIT TO DETERM LDCT ELIG: HCPCS | Performed by: INTERNAL MEDICINE

## 2025-02-25 RX ORDER — FLUTICASONE PROPIONATE AND SALMETEROL 113; 14 UG/1; UG/1
1 POWDER, METERED RESPIRATORY (INHALATION) 2 TIMES DAILY
Qty: 1 EACH | Refills: 11 | Status: SHIPPED | OUTPATIENT
Start: 2025-02-25

## 2025-02-25 ASSESSMENT — ENCOUNTER SYMPTOMS
COUGH: 0
WHEEZING: 0
SHORTNESS OF BREATH: 1
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
## 2025-02-25 NOTE — PATIENT INSTRUCTIONS
Lung Cancer Screening   Frequently Asked Questions  If you are at high-risk for lung cancer, getting screened with low-dose computed tomography (LDCT) every year can help save your life. This handout offers answers to some of the most common questions about lung cancer screening. If you have other questions, please call [phone] ([phone]).     What is it?  Lung cancer screening uses special X-ray technology to create an image of your lung tissue. The exam is quick and easy and takes less than 10 seconds. We don t give you any medicine or use any needles. You can eat before and after the exam. You don t need to change your clothes as long as the clothing on your chest doesn t contain metal. But, you do need to be able to hold your breath for at least 6 seconds during the exam.    What is the goal of lung cancer screening?  The goal of lung cancer screening is to save lives. Many times, lung cancer is not found until a person starts having physical symptoms. Lung cancer screening can help detect lung cancer in the earliest stages when it may be easier to treat.    Who should be screened for lung cancer?  We suggest lung cancer screening for anyone who is at high-risk for lung cancer. You are in the high-risk group if you:      are between the ages of 55 and 79, and    have smoked at least 1 pack of cigarettes a day for 20 or more years, and    still smoke or have quit within the past 15 years.    However, if you have a new cough or shortness of breath, you should talk to your doctor before being screened.    Why does it matter if I have symptoms?  Certain symptoms can be a sign that you have a condition in your lungs that should be checked and treated by your doctor. These symptoms include fever, chest pain, a new or changing cough, shortness of breath that you have never felt before, coughing up blood or unexplained weight loss. Having any of these symptoms can greatly affect the results of lung  cancer screening.       Should all smokers get an LDCT lung cancer screening exam?  It depends. Lung cancer screening is for a very specific group of men and women who have a history of heavy smoking over a long period of time (see  Who should be screened for lung cancer  above).  I am in the high-risk group, but have been diagnosed with cancer in the past. Is LDCT lung cancer screening right for me?  In some cases, you should not have LDCT lung screening, such as when your doctor is already following your cancer with CT scan studies. Your doctor will help you decide if LDCT lung screening is right for you.  Do I need to have a screening exam every year?  Yes. If you are in the high-risk group described earlier, you should get an LDCT lung cancer screening exam every year until you are 79, or are no longer willing or able to undergo screening and possible procedures to diagnose and treat lung cancer.  How effective is LDCT at preventing death from lung cancer?  Studies have shown that LDCT lung cancer screening can lower the risk of death from lung cancer by 20 percent in people who are at high-risk.  What are the risks?  There are some risks and limitations of LDCT lung cancer screening. We want to make sure you understand the risks and benefits, so please let us know if you have any questions. Your doctor may want to talk with you more about these risks.    Radiation exposure: As with any exam that uses radiation, there is a very small increased risk of cancer. The amount of radiation in LDCT is small--about the same amount a person would get from a mammogram. Your doctor orders the exam when he or she feels the potential benefits outweigh the risks.    False negatives: No test is perfect, including LDCT. It is possible that you may have a medical condition, including lung cancer, that is not found during your exam. This is called a false negative result.    False positives and more testing: LDCT very often finds  something in the lung that could be cancer, but in fact is not. This is called a false positive result. False positive tests often cause anxiety. To make sure these findings are not cancer, you may need to have more tests. These tests will be done only if you give us permission. Sometimes patients need a treatment that can have side effects, such as a biopsy. For more information on false positives, see  What can I expect from the results?     Findings not related to lung cancer: Your LDCT exam also takes pictures of areas of your body next to your lungs. In a very small number of cases, the CT scan will show an abnormal finding in one of these areas, such as your kidneys, adrenal glands, liver or thyroid. This finding may not be serious, but you may need more tests. Your doctor can help you decide what other tests you may need, if any.  What can I expect from the results?  About 1 out of 4 LDCT exams will find something that may need more tests. Most of the time, these findings are lung nodules. Lung nodules are very small collections of tissue in the lung. These nodules are very common, and the vast majority--more than 97 percent--are not cancer (benign). Most are normal lymph nodes or small areas of scarring from past infections.  But, if a small lung nodule is found to be cancer, the cancer can be cured more than 90 percent of the time. To know if the nodule is cancer, we may need to get more images before your next yearly screening exam. If the nodule has suspicious features (for example, it is large, has an odd shape or grows over time), we will refer you to a specialist for further testing.  Will my doctor also get the results?  Yes. Your doctor will get a copy of your results.  Is it okay to keep smoking now that there s a cancer screening exam?  No. Tobacco is one of the strongest cancer-causing agents. It causes not only lung cancer, but other cancers and cardiovascular (heart) diseases as well. The damage  caused by smoking builds over time. This means that the longer you smoke, the higher your risk of disease. While it is never too late to quit, the sooner you quit, the better.  Where can I find help to quit smoking?  The best way to prevent lung cancer is to stop smoking. If you have already quit smoking, congratulations and keep it up! For help on quitting smoking, please call [employer] at 5-221-QUITNOW (1-628.282.2881) or the American [employer] Society at 1-601.348.3416 to find local resources near you.  One-on-one health coaching:  If you d prefer to work individually with a health care provider on tobacco cessation, we offer:      Medication Therapy Management:  Our specially trained pharmacists work closely with you and your doctor to help you quit smoking.  Call [phone] or [phone] (toll free).

## 2025-02-25 NOTE — TELEPHONE ENCOUNTER
Our care team previously received a denial for this patient. The denial letter stated the insurance company preferred generic airduo. This is why Dr. Cee ordered this medication. Contacted the pharmacy to alert them of this previous denial. The pharmacy will fax over a prior authorization for generic Airduo.     Previous denial letter:      Britney Steel RN on 2/25/2025 at 3:29 PM

## 2025-02-27 NOTE — TELEPHONE ENCOUNTER
Requested Prescriptions   Pending Prescriptions Disp Refills    AIRDUO RESPICLICK 113/14 113-14 MCG/ACT inhaler [Pharmacy Med Name: AIRDUO RESPI 113-14  INH]  11     Sig: INHALE 1 PUFF TWICE DAILY       Inhaled Steroids Protocol Failed - 2/27/2025  9:49 AM        Failed - Asthma control assessment score within normal limits in last 6 months     Please review ACT score.           Failed - Medication is active on med list and the sig matches. RN to manually verify dose and sig if red X/fail.     If the protocol passes (green check), you do not need to verify med dose and sig.    A prescription matches if they are the same clinical intention.    For Example: once daily and every morning are the same.    For all fails (red x), verify dose and sig.    If the refill does match what is on file, the RN can still proceed to approve the refill request.     If they do not match, route to the appropriate provider.             Passed - Patient is age 12 or older        Passed - Recent (6 mo) or future (90 days) visit within the authorizing provider's specialty     The patient must have completed an in-person or virtual visit within the past 6 months or has a future visit scheduled within the next 90 days with the authorizing provider s specialty.  Urgent care and e-visits do not quality as an office visit for this protocol.          Passed - Medication indicated for associated diagnosis     Medication is associated with one or more of the following diagnoses:    Allergies   Asthma   COPD   Nasal Congestion   Nasal Polyps   Rhinitis   Cystic Fibrosis   Bronchiectasis               Unable to fill per RN protocol, will forward to provider for review.       Kaitlynn Alvarez RN on 2/27/2025 at 9:49 AM

## 2025-03-03 ENCOUNTER — TELEPHONE (OUTPATIENT)
Dept: PULMONOLOGY | Facility: CLINIC | Age: 75
End: 2025-03-03
Payer: COMMERCIAL

## 2025-03-03 NOTE — TELEPHONE ENCOUNTER
PRIOR AUTHORIZATION DENIED    Medication: AIRDUO RESPICLICK 113/14 113-14 MCG/ACT IN [facility]  Insurance Company: [employer] Non-Specialty PA's - Phone [phone] Fax [phone]  Denial Date: 3/3/2025  Denial Reason(s):       Confirmed with insurance, drug is excluded, PAs and appeals are not available for this medication.      Appeal Information: n/a  Patient Notified: NO

## 2025-03-04 NOTE — TELEPHONE ENCOUNTER
See encounter from 2/25/25 for more information. Sent secure message to Dr. Vazquez to determine new plan of care.     Britney Steel RN on 3/4/2025 at 12:57 PM

## 2025-03-05 NOTE — TELEPHONE ENCOUNTER
Sent high priority message to Dr. Cee with an update on plan of care. Patient notified via voicemail.   Delmi Woodard RN on 3/5/2025 at 2:58 PM

## 2025-03-05 NOTE — TELEPHONE ENCOUNTER
Pt stopped in clinic wondering what new plan of care is- he was understanding that the oxygen was medically necessary for him and it was already approved. Please call with update as pt is anxiously waiting and reported that he is still short of breath at times.

## 2025-04-03 DIAGNOSIS — F33.0 MAJOR DEPRESSIVE DISORDER, RECURRENT EPISODE, MILD: ICD-10-CM

## 2025-04-08 ENCOUNTER — OFFICE VISIT (OUTPATIENT)
Dept: PULMONOLOGY | Facility: CLINIC | Age: 75
End: 2025-04-08
Payer: COMMERCIAL

## 2025-04-08 ENCOUNTER — HOSPITAL ENCOUNTER (OUTPATIENT)
Dept: RESPIRATORY THERAPY | Facility: CLINIC | Age: 75
Discharge: HOME | End: 2025-04-08
Attending: INTERNAL MEDICINE
Payer: MEDICARE

## 2025-04-08 VITALS
SYSTOLIC BLOOD PRESSURE: 104 MMHG | WEIGHT: 230 LBS | OXYGEN SATURATION: 97 % | BODY MASS INDEX: 32.2 KG/M2 | HEART RATE: 90 BPM | HEIGHT: 71 IN | TEMPERATURE: 97.5 F | DIASTOLIC BLOOD PRESSURE: 60 MMHG

## 2025-04-08 DIAGNOSIS — J45.40 MODERATE PERSISTENT ASTHMA WITHOUT COMPLICATION: ICD-10-CM

## 2025-04-08 DIAGNOSIS — J44.9 CHRONIC OBSTRUCTIVE PULMONARY DISEASE, UNSPECIFIED COPD TYPE (H): Primary | ICD-10-CM

## 2025-04-08 DIAGNOSIS — J45.40 MODERATE PERSISTENT ASTHMA WITHOUT COMPLICATION: Primary | ICD-10-CM

## 2025-04-08 LAB
6 MIN WALK (FT): 1050 FT
6 MIN WALK (M): 320 M

## 2025-04-08 PROCEDURE — 1126F AMNT PAIN NOTED NONE PRSNT: CPT | Performed by: INTERNAL MEDICINE

## 2025-04-08 PROCEDURE — 94618 PULMONARY STRESS TESTING: CPT

## 2025-04-08 PROCEDURE — 3074F SYST BP LT 130 MM HG: CPT | Performed by: INTERNAL MEDICINE

## 2025-04-08 PROCEDURE — 3078F DIAST BP <80 MM HG: CPT | Performed by: INTERNAL MEDICINE

## 2025-04-08 PROCEDURE — 99213 OFFICE O/P EST LOW 20 MIN: CPT | Performed by: INTERNAL MEDICINE

## 2025-04-08 RX ORDER — PAROXETINE 10 MG/1
TABLET, FILM COATED ORAL
Qty: 90 TABLET | Refills: 0 | Status: SHIPPED | OUTPATIENT
Start: 2025-04-08

## 2025-04-08 RX ORDER — PREDNISONE 20 MG/1
20 TABLET ORAL DAILY
Qty: 5 TABLET | Refills: 0 | Status: SHIPPED | OUTPATIENT
Start: 2025-04-08 | End: 2025-04-13

## 2025-04-08 ASSESSMENT — PAIN SCALES - GENERAL: PAINLEVEL_OUTOF10: NO PAIN (0)

## 2025-04-08 NOTE — PROGRESS NOTES
Does Mj have home oxygen?  Yes     (If yes please fill out below.  If no please delete links)    HOME OXYGEN  Concentrator  O2 flow rate 4 L/min @ with exertion, or when home.    nasal cannula    [facility] wanting to get a portable unit

## 2025-04-08 NOTE — PROGRESS NOTES
Six Minute Walk Test:  Probe: Finger Stops: 0  Patient walked 1050 Ft/ 320 M    in 6 minutes. LLN = 1306 Ft/ 398 M  Pre-walk: SP02 94% Heart Rate 65 Michael Dyspnea = 4 Fatigue = 4-5  On RA: Lowest SPO2 90% Highest HR: 91  Post-walk: SP02 90% Heart Rate 89 Michael Dyspnea = 5-7 Fatigue = 7-8  Recovery time to baseline 02 SAT 2:00 minutes and HR 2:00 minutes.  Patient reports walk distance may have been affected by  Pt felt that the fatigue and SOB caused his slower pace. Pt was visibly SOB and wheezing during the 6 minute walk.   Thank you
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
## 2025-04-08 NOTE — DISCHARGE INSTRUCTIONS
Thank you for completing pulmonary function testing today.  All results will be scanned into your epic results for your doctor to review.  Please resume taking all your current prescribed medications and diet as directed by your provider.   If you have not heard from your provider about your testing within two weeks and do not have a follow-up appointment scheduled with them please contact your provider about any questions you have concerning your testing.   Thank you  [facility] Pulmonary Function [facility]

## 2025-04-09 ASSESSMENT — ENCOUNTER SYMPTOMS
WHEEZING: 0
SHORTNESS OF BREATH: 1
SPUTUM PRODUCTION: 0
COUGH: 0

## 2025-04-09 NOTE — TELEPHONE ENCOUNTER
Contacted patient and let him know that he needs an appointment to establish care. Patient wanted to set up appointment with the provider who filled his meds. Appointment scheduled in June with Dr. Chandra.   No further questions at ending of call.     Carmen Bustillos LPN

## 2025-04-15 DIAGNOSIS — I10 HYPERTENSION GOAL BP (BLOOD PRESSURE) < 140/90: ICD-10-CM

## 2025-04-16 RX ORDER — AMLODIPINE BESYLATE 10 MG/1
10 TABLET ORAL DAILY
Qty: 90 TABLET | Refills: 0 | Status: SHIPPED | OUTPATIENT
Start: 2025-04-16

## 2025-04-29 DIAGNOSIS — E11.9 TYPE 2 DIABETES, HBA1C GOAL < 7% (H): ICD-10-CM

## 2025-04-30 RX ORDER — GLYBURIDE-METFORMIN HYDROCHLORIDE 5; 500 MG/1; MG/1
TABLET ORAL
Qty: 20 TABLET | Refills: 0 | Status: SHIPPED | OUTPATIENT
Start: 2025-04-30

## 2025-05-02 DIAGNOSIS — E11.9 TYPE 2 DIABETES, HBA1C GOAL < 7% (H): ICD-10-CM

## 2025-05-06 DIAGNOSIS — E78.5 HYPERLIPIDEMIA LDL GOAL <100: ICD-10-CM

## 2025-05-06 DIAGNOSIS — G47.9 SLEEP DISORDER: ICD-10-CM

## 2025-05-06 DIAGNOSIS — E11.9 TYPE 2 DIABETES, HBA1C GOAL < 7% (H): ICD-10-CM

## 2025-05-06 DIAGNOSIS — I10 ESSENTIAL HYPERTENSION WITH GOAL BLOOD PRESSURE LESS THAN 140/90: ICD-10-CM

## 2025-05-06 RX ORDER — AMITRIPTYLINE HYDROCHLORIDE 10 MG/1
TABLET ORAL
Qty: 270 TABLET | Refills: 0 | Status: SHIPPED | OUTPATIENT
Start: 2025-05-06

## 2025-05-06 RX ORDER — METOPROLOL TARTRATE 100 MG/1
100 TABLET ORAL 2 TIMES DAILY
Qty: 180 TABLET | Refills: 0 | Status: SHIPPED | OUTPATIENT
Start: 2025-05-06

## 2025-05-26 RX ORDER — FLUTICASONE PROPIONATE AND SALMETEROL 113; 14 UG/1; UG/1
POWDER, METERED RESPIRATORY (INHALATION)
Refills: 11 | OUTPATIENT
Start: 2025-05-26

## 2025-06-21 DIAGNOSIS — E11.9 TYPE 2 DIABETES, HBA1C GOAL < 7% (H): ICD-10-CM

## 2025-06-25 RX ORDER — GLYBURIDE-METFORMIN HYDROCHLORIDE 5; 500 MG/1; MG/1
1 TABLET ORAL 2 TIMES DAILY WITH MEALS
Qty: 90 TABLET | Refills: 0 | Status: SHIPPED | OUTPATIENT
Start: 2025-06-25

## 2025-06-30 DIAGNOSIS — E11.9 TYPE 2 DIABETES, HBA1C GOAL < 7% (H): ICD-10-CM

## 2025-06-30 RX ORDER — GLYBURIDE-METFORMIN HYDROCHLORIDE 5; 500 MG/1; MG/1
1 TABLET ORAL 2 TIMES DAILY WITH MEALS
Qty: 90 TABLET | Refills: 0 | OUTPATIENT
Start: 2025-06-30

## 2025-07-01 ENCOUNTER — HOSPITAL ENCOUNTER (OUTPATIENT)
Dept: CT IMAGING | Facility: CLINIC | Age: 75
Discharge: HOME | End: 2025-07-01
Attending: INTERNAL MEDICINE
Payer: MEDICARE

## 2025-07-01 DIAGNOSIS — Z87.891 PERSONAL HISTORY OF TOBACCO USE: ICD-10-CM

## 2025-07-01 PROCEDURE — 71271 CT THORAX LUNG CANCER SCR C-: CPT

## 2025-07-02 ENCOUNTER — OFFICE VISIT (OUTPATIENT)
Dept: FAMILY MEDICINE | Facility: CLINIC | Age: 75
End: 2025-07-02
Payer: COMMERCIAL

## 2025-07-02 VITALS
SYSTOLIC BLOOD PRESSURE: 125 MMHG | DIASTOLIC BLOOD PRESSURE: 72 MMHG | RESPIRATION RATE: 21 BRPM | OXYGEN SATURATION: 90 % | BODY MASS INDEX: 31.94 KG/M2 | TEMPERATURE: 97.5 F | HEIGHT: 72 IN | HEART RATE: 74 BPM | WEIGHT: 235.8 LBS

## 2025-07-02 DIAGNOSIS — E11.9 TYPE 2 DIABETES MELLITUS WITHOUT COMPLICATION, WITH LONG-TERM CURRENT USE OF INSULIN (H): Primary | ICD-10-CM

## 2025-07-02 DIAGNOSIS — I10 HYPERTENSION GOAL BP (BLOOD PRESSURE) < 140/90: ICD-10-CM

## 2025-07-02 DIAGNOSIS — Z79.4 TYPE 2 DIABETES MELLITUS WITHOUT COMPLICATION, WITH LONG-TERM CURRENT USE OF INSULIN (H): Primary | ICD-10-CM

## 2025-07-02 DIAGNOSIS — Z23 NEED FOR VACCINATION: ICD-10-CM

## 2025-07-02 DIAGNOSIS — R06.09 DOE (DYSPNEA ON EXERTION): ICD-10-CM

## 2025-07-02 DIAGNOSIS — Z13.6 SCREENING FOR CARDIOVASCULAR CONDITION: ICD-10-CM

## 2025-07-02 DIAGNOSIS — I10 ESSENTIAL HYPERTENSION WITH GOAL BLOOD PRESSURE LESS THAN 140/90: ICD-10-CM

## 2025-07-02 DIAGNOSIS — N18.31 STAGE 3A CHRONIC KIDNEY DISEASE (H): ICD-10-CM

## 2025-07-02 PROBLEM — J41.0 SIMPLE CHRONIC BRONCHITIS (H): Status: RESOLVED | Noted: 2018-08-14 | Resolved: 2025-07-02

## 2025-07-02 LAB
ANION GAP SERPL CALCULATED.3IONS-SCNC: 14 MMOL/L (ref 7–15)
BUN SERPL-MCNC: 24.1 MG/DL (ref 8–23)
CALCIUM SERPL-MCNC: 9.1 MG/DL (ref 8.8–10.4)
CHLORIDE SERPL-SCNC: 101 MMOL/L (ref 98–107)
CHOLEST SERPL-MCNC: 138 MG/DL
CREAT SERPL-MCNC: 2.43 MG/DL (ref 0.67–1.17)
EGFRCR SERPLBLD CKD-EPI 2021: 27 ML/MIN/1.73M2
ERYTHROCYTE [DISTWIDTH] IN BLOOD BY AUTOMATED COUNT: 14.3 % (ref 10–15)
EST. AVERAGE GLUCOSE BLD GHB EST-MCNC: 163 MG/DL
FASTING STATUS PATIENT QL REPORTED: NO
FASTING STATUS PATIENT QL REPORTED: NO
GLUCOSE SERPL-MCNC: 83 MG/DL (ref 70–99)
HBA1C MFR BLD: 7.3 %
HCO3 SERPL-SCNC: 26 MMOL/L (ref 22–29)
HCT VFR BLD AUTO: 41.4 % (ref 40–53)
HDLC SERPL-MCNC: 39 MG/DL
HGB BLD-MCNC: 14 G/DL (ref 13.3–17.7)
LDLC SERPL CALC-MCNC: 56 MG/DL
MCH RBC QN AUTO: 31 PG (ref 26.5–33)
MCHC RBC AUTO-ENTMCNC: 33.8 G/DL (ref 31.5–36.5)
MCV RBC AUTO: 92 FL (ref 78–100)
NONHDLC SERPL-MCNC: 99 MG/DL
PLATELET # BLD AUTO: 319 10E3/UL (ref 150–450)
POTASSIUM SERPL-SCNC: 4.8 MMOL/L (ref 3.4–5.3)
RBC # BLD AUTO: 4.51 10E6/UL (ref 4.4–5.9)
SODIUM SERPL-SCNC: 141 MMOL/L (ref 135–145)
TRIGL SERPL-MCNC: 214 MG/DL
WBC # BLD AUTO: 10.3 10E3/UL (ref 4–11)

## 2025-07-02 PROCEDURE — 80048 BASIC METABOLIC PNL TOTAL CA: CPT | Performed by: FAMILY MEDICINE

## 2025-07-02 PROCEDURE — 99214 OFFICE O/P EST MOD 30 MIN: CPT | Mod: 25 | Performed by: FAMILY MEDICINE

## 2025-07-02 PROCEDURE — 36415 COLL VENOUS BLD VENIPUNCTURE: CPT | Performed by: FAMILY MEDICINE

## 2025-07-02 PROCEDURE — 80061 LIPID PANEL: CPT | Performed by: FAMILY MEDICINE

## 2025-07-02 PROCEDURE — 3078F DIAST BP <80 MM HG: CPT | Performed by: FAMILY MEDICINE

## 2025-07-02 PROCEDURE — 3051F HG A1C>EQUAL 7.0%<8.0%: CPT | Performed by: FAMILY MEDICINE

## 2025-07-02 PROCEDURE — 83036 HEMOGLOBIN GLYCOSYLATED A1C: CPT | Performed by: FAMILY MEDICINE

## 2025-07-02 PROCEDURE — 85027 COMPLETE CBC AUTOMATED: CPT | Performed by: FAMILY MEDICINE

## 2025-07-02 PROCEDURE — G2211 COMPLEX E/M VISIT ADD ON: HCPCS | Performed by: FAMILY MEDICINE

## 2025-07-02 PROCEDURE — 3048F LDL-C <100 MG/DL: CPT | Performed by: FAMILY MEDICINE

## 2025-07-02 PROCEDURE — 1126F AMNT PAIN NOTED NONE PRSNT: CPT | Performed by: FAMILY MEDICINE

## 2025-07-02 PROCEDURE — 3074F SYST BP LT 130 MM HG: CPT | Performed by: FAMILY MEDICINE

## 2025-07-02 RX ORDER — LISINOPRIL 10 MG/1
TABLET ORAL
Qty: 90 TABLET | Refills: 3 | Status: SHIPPED | OUTPATIENT
Start: 2025-07-02

## 2025-07-02 ASSESSMENT — PAIN SCALES - GENERAL: PAINLEVEL_OUTOF10: NO PAIN (0)

## 2025-07-02 ASSESSMENT — PATIENT HEALTH QUESTIONNAIRE - PHQ9
SUM OF ALL RESPONSES TO PHQ QUESTIONS 1-9: 5
10. IF YOU CHECKED OFF ANY PROBLEMS, HOW DIFFICULT HAVE THESE PROBLEMS MADE IT FOR YOU TO DO YOUR WORK, TAKE CARE OF THINGS AT HOME, OR GET ALONG WITH OTHER PEOPLE: SOMEWHAT DIFFICULT
SUM OF ALL RESPONSES TO PHQ QUESTIONS 1-9: 5

## 2025-07-02 NOTE — PROGRESS NOTES
Assessment & Plan       ICD-10-CM    1. Type 2 diabetes mellitus without complication, with long-term current use of insulin (H)  E11.9 Adult Eye  Referral    Z79.4 HEMOGLOBIN A1C     Lipid panel reflex to direct LDL Non-fasting     Albumin Random Urine Quantitative with Creat Ratio     HEMOGLOBIN A1C     Lipid panel reflex to direct LDL Non-fasting     Albumin Random Urine Quantitative with Creat Ratio      2. Need for vaccination  Z23       3. Essential hypertension with goal blood pressure less than 140/90  I10 BASIC METABOLIC PANEL     BASIC METABOLIC PANEL      4. Screening for cardiovascular condition  Z13.6       5. Stage 3a chronic kidney disease (H)  N18.31 Hemoglobin     CANCELED: Hemoglobin      6. Hypertension goal BP (blood pressure) < 140/90  I10 lisinopril (ZESTRIL) 10 MG tablet      7. VELAZQUEZ (dyspnea on exertion)  R06.09 CBC with platelets     Med Therapy Management Referral     CBC with platelets           Above issues stable.  Chronic disease recheck.  Labs and refills as above.  I can see her back routine in a few months.  Encouraged weight loss.  Given his diabetes and restrictive lung disease.  Continue to follow with pulmonary and hopefully are able to find a inhaler that works with his insurance.    Return in about 3 months (around 10/2/2025).    Tom Chandra MD  [facility]     The longitudinal plan of care for the diagnosis(es)/condition(s) as documented were addressed during this visit. Due to the added complexity in care, I will continue to support Konstantin in the subsequent management and with ongoing continuity of care.     Subjective   Konstantin is a 74 year old, presenting for the following health issues:  Establish Care and Diabetes      7/2/2025    12:52 PM   Additional Questions   Roomed by Yana     History of Present Illness       Diabetes:   He presents for follow up of diabetes.  He is checking home blood glucose one time daily.   He checks blood  "glucose before meals.  Blood glucose is never over 200 and sometimes under 70.  When his blood glucose is low, the patient is asymptomatic for confusion, blurred vision, lethargy and reports not feeling dizzy, shaky, or weak.   He has no concerns regarding his diabetes at this time.   He is not experiencing numbness or burning in feet, excessive thirst, blurry vision, weight changes or redness, sores or blisters on feet. The patient has not had a diabetic eye exam in the last 12 months.          He eats 0-1 servings of fruits and vegetables daily.He consumes 1 sweetened beverage(s) daily.He exercises with enough effort to increase his heart rate 9 or less minutes per day.  He exercises with enough effort to increase his heart rate 3 or less days per week.   He is taking medications regularly.      Feel slowly more VELAZQUEZ  Wearing oxy during day, tried at night, but comes off  Discussed brining wt down, restriction on pft,   DM still just below       Review of Systems  Constitutional, HEENT, cardiovascular, pulmonary, gi and gu systems are negative, except as otherwise noted.      Objective    /72 (BP Location: Right arm, Patient Position: Sitting, Cuff Size: Adult Large)   Pulse 74   Temp 97.5  F (36.4  C) (Temporal)   Resp 21   Ht 1.83 m (6' 0.05\")   Wt 107 kg (235 lb 12.8 oz)   SpO2 (!) 90%   BMI 31.94 kg/m    Body mass index is 31.94 kg/m .  Physical Exam   GENERAL: alert and no distress  NECK: no adenopathy, no asymmetry, masses, or scars  RESP: lungs clear to auscultation - no rales, rhonchi or wheezes  CV: regular rate and rhythm, normal S1 S2, no S3 or S4, no murmur, click or rub, no peripheral edema  ABDOMEN: soft, nontender, no hepatosplenomegaly, no masses and bowel sounds normal  MS: no gross musculoskeletal defects noted, no edema            Signed Electronically by: Tom Chandra MD    "

## 2025-07-03 ENCOUNTER — RESULTS FOLLOW-UP (OUTPATIENT)
Dept: FAMILY MEDICINE | Facility: CLINIC | Age: 75
End: 2025-07-03

## 2025-07-03 ENCOUNTER — PATIENT OUTREACH (OUTPATIENT)
Dept: CARE COORDINATION | Facility: CLINIC | Age: 75
End: 2025-07-03
Payer: COMMERCIAL

## 2025-07-03 DIAGNOSIS — F33.0 MAJOR DEPRESSIVE DISORDER, RECURRENT EPISODE, MILD: ICD-10-CM

## 2025-07-03 DIAGNOSIS — N18.31 STAGE 3A CHRONIC KIDNEY DISEASE (H): Primary | ICD-10-CM

## 2025-07-07 ENCOUNTER — PATIENT OUTREACH (OUTPATIENT)
Dept: CARE COORDINATION | Facility: CLINIC | Age: 75
End: 2025-07-07
Payer: COMMERCIAL

## 2025-07-08 RX ORDER — PAROXETINE 10 MG/1
TABLET, FILM COATED ORAL
Qty: 90 TABLET | Refills: 0 | Status: SHIPPED | OUTPATIENT
Start: 2025-07-08

## 2025-07-15 ENCOUNTER — LAB (OUTPATIENT)
Dept: LAB | Facility: CLINIC | Age: 75
End: 2025-07-15
Payer: COMMERCIAL

## 2025-07-15 DIAGNOSIS — N18.31 STAGE 3A CHRONIC KIDNEY DISEASE (H): ICD-10-CM

## 2025-07-15 DIAGNOSIS — E11.9 DIABETES MELLITUS, TYPE 2 (H): Primary | ICD-10-CM

## 2025-07-15 LAB
ANION GAP SERPL CALCULATED.3IONS-SCNC: 13 MMOL/L (ref 7–15)
BUN SERPL-MCNC: 16.6 MG/DL (ref 8–23)
CALCIUM SERPL-MCNC: 9 MG/DL (ref 8.8–10.4)
CHLORIDE SERPL-SCNC: 101 MMOL/L (ref 98–107)
CREAT SERPL-MCNC: 1.54 MG/DL (ref 0.67–1.17)
CREAT UR-MCNC: 78.1 MG/DL
EGFRCR SERPLBLD CKD-EPI 2021: 47 ML/MIN/1.73M2
GLUCOSE SERPL-MCNC: 106 MG/DL (ref 70–99)
HCO3 SERPL-SCNC: 21 MMOL/L (ref 22–29)
MICROALBUMIN UR-MCNC: <12 MG/L
MICROALBUMIN/CREAT UR: NORMAL MG/G{CREAT}
POTASSIUM SERPL-SCNC: 5.1 MMOL/L (ref 3.4–5.3)
SODIUM SERPL-SCNC: 135 MMOL/L (ref 135–145)

## 2025-07-15 PROCEDURE — 36415 COLL VENOUS BLD VENIPUNCTURE: CPT

## 2025-07-15 PROCEDURE — 82570 ASSAY OF URINE CREATININE: CPT

## 2025-07-15 PROCEDURE — 80048 BASIC METABOLIC PNL TOTAL CA: CPT

## 2025-07-15 PROCEDURE — 82043 UR ALBUMIN QUANTITATIVE: CPT

## 2025-07-31 DIAGNOSIS — I10 ESSENTIAL HYPERTENSION WITH GOAL BLOOD PRESSURE LESS THAN 140/90: ICD-10-CM

## 2025-07-31 DIAGNOSIS — E11.9 TYPE 2 DIABETES, HBA1C GOAL < 7% (H): ICD-10-CM

## 2025-07-31 DIAGNOSIS — E78.5 HYPERLIPIDEMIA LDL GOAL <100: ICD-10-CM

## 2025-07-31 DIAGNOSIS — G47.9 SLEEP DISORDER: ICD-10-CM

## 2025-07-31 RX ORDER — AMITRIPTYLINE HYDROCHLORIDE 10 MG/1
TABLET ORAL
Qty: 270 TABLET | Refills: 0 | Status: SHIPPED | OUTPATIENT
Start: 2025-07-31

## 2025-07-31 RX ORDER — METOPROLOL TARTRATE 100 MG/1
100 TABLET ORAL 2 TIMES DAILY
Qty: 180 TABLET | Refills: 2 | Status: SHIPPED | OUTPATIENT
Start: 2025-07-31

## 2025-08-18 DIAGNOSIS — E11.9 TYPE 2 DIABETES, HBA1C GOAL < 7% (H): ICD-10-CM

## 2025-08-26 ENCOUNTER — OFFICE VISIT (OUTPATIENT)
Dept: PULMONOLOGY | Facility: CLINIC | Age: 75
End: 2025-08-26
Payer: COMMERCIAL

## 2025-08-26 VITALS
HEART RATE: 74 BPM | OXYGEN SATURATION: 91 % | RESPIRATION RATE: 18 BRPM | DIASTOLIC BLOOD PRESSURE: 54 MMHG | BODY MASS INDEX: 31.34 KG/M2 | WEIGHT: 236.5 LBS | TEMPERATURE: 97.8 F | HEIGHT: 73 IN | SYSTOLIC BLOOD PRESSURE: 118 MMHG

## 2025-08-26 DIAGNOSIS — J96.11 CHRONIC RESPIRATORY FAILURE WITH HYPOXIA (H): Primary | ICD-10-CM

## 2025-08-26 DIAGNOSIS — R91.8 PULMONARY NODULES: ICD-10-CM

## 2025-08-26 PROCEDURE — 99214 OFFICE O/P EST MOD 30 MIN: CPT | Performed by: INTERNAL MEDICINE

## 2025-08-26 PROCEDURE — 3078F DIAST BP <80 MM HG: CPT | Performed by: INTERNAL MEDICINE

## 2025-08-26 PROCEDURE — 3074F SYST BP LT 130 MM HG: CPT | Performed by: INTERNAL MEDICINE

## 2025-08-26 ASSESSMENT — ENCOUNTER SYMPTOMS
COUGH: 0
SHORTNESS OF BREATH: 1
WHEEZING: 0

## 2025-09-02 ENCOUNTER — HOSPITAL ENCOUNTER (OUTPATIENT)
Dept: RESPIRATORY THERAPY | Facility: CLINIC | Age: 75
Discharge: HOME | End: 2025-09-02
Attending: INTERNAL MEDICINE
Payer: MEDICARE

## 2025-09-02 DIAGNOSIS — J96.11 CHRONIC RESPIRATORY FAILURE WITH HYPOXIA (H): ICD-10-CM

## 2025-09-02 LAB
6 MIN WALK (FT): 850 FT
6 MIN WALK (M): 259 M

## 2025-09-02 PROCEDURE — 94618 PULMONARY STRESS TESTING: CPT

## 2025-09-04 DIAGNOSIS — E11.9 TYPE 2 DIABETES, HBA1C GOAL < 7% (H): ICD-10-CM

## 2025-09-04 RX ORDER — GLYBURIDE-METFORMIN HYDROCHLORIDE 5; 500 MG/1; MG/1
1 TABLET ORAL 2 TIMES DAILY WITH MEALS
Qty: 90 TABLET | Refills: 1 | Status: SHIPPED | OUTPATIENT
Start: 2025-09-04

## (undated) DEVICE — KIT ENDO TURNOVER/PROCEDURE CARRY-ON 101822

## (undated) DEVICE — SOL WATER IRRIG 1000ML BOTTLE 07139-09

## (undated) DEVICE — LUBRICATING JELLY 4.25OZ

## (undated) DEVICE — GLOVE EXAM NITRILE LG

## (undated) DEVICE — TUBING SUCTION 12"X1/4" N612